# Patient Record
Sex: MALE | Race: WHITE | NOT HISPANIC OR LATINO | Employment: FULL TIME | URBAN - METROPOLITAN AREA
[De-identification: names, ages, dates, MRNs, and addresses within clinical notes are randomized per-mention and may not be internally consistent; named-entity substitution may affect disease eponyms.]

---

## 2017-02-27 ENCOUNTER — OFFICE VISIT (OUTPATIENT)
Dept: ORTHOPEDICS | Facility: CLINIC | Age: 42
End: 2017-02-27
Payer: COMMERCIAL

## 2017-02-27 VITALS — RESPIRATION RATE: 16 BRPM | WEIGHT: 223.2 LBS | HEIGHT: 73 IN | BODY MASS INDEX: 29.58 KG/M2

## 2017-02-27 DIAGNOSIS — M17.0 PRIMARY OSTEOARTHRITIS OF BOTH KNEES: Primary | ICD-10-CM

## 2017-02-27 PROCEDURE — 20610 DRAIN/INJ JOINT/BURSA W/O US: CPT | Mod: 50 | Performed by: ORTHOPAEDIC SURGERY

## 2017-02-27 RX ORDER — METHYLPREDNISOLONE ACETATE 80 MG/ML
80 INJECTION, SUSPENSION INTRA-ARTICULAR; INTRALESIONAL; INTRAMUSCULAR; SOFT TISSUE ONCE
Qty: 2 ML | Refills: 0 | OUTPATIENT
Start: 2017-02-27 | End: 2017-02-27

## 2017-02-27 ASSESSMENT — PAIN SCALES - GENERAL: PAINLEVEL: MODERATE PAIN (4)

## 2017-02-27 NOTE — PROGRESS NOTES
The patient's right knee was prepped with betadine solution after verification of allergies. Area approximately 10 cm x 10 cm prepped in a sterile fashion. After injection, betadine removed with soap and water and band-aids applied.    4cc Lidocaine 1%  NDC 9799-3441-41, LOT -dk,  2018 injected into right knee stacey-superolateral soft/subcutaneous tissue  50cc of yellow/straw colored fluid was intra-articularly aspirated from right knee via the suprapatellar pouch    4cc Lidocaine 1%  NDC 8093-6778-84, LOT ,  2018  3cc Bupivacaine 0.25% NDC 1558-6855-03, LOT ,  2018  1cc Depo Medrol 80 mg/ml NDC 3803-2091-99, LOT [id number],  2019 injected into patient's right Knee by:  Van Napoles PA-C, [facility] (Ortho)  Supervising Physician: Osvaldo Mccoy M.D., M.S.  Dept. of Orthopaedic Surgery  [facility]    The patient's left knee was prepped with betadine solution after verification of allergies. Area approximately 10 cm x 10 cm prepped in a sterile fashion. After injection, betadine removed with soap and water and band-aids applied.    4cc Lidocaine 1%  NDC 6917-7850-66, LOT -dk,  2018  3cc Bupivacaine 0.25% NDC 2860-8343-86, LOT -dk,  2018  1cc Depo Medrol 80 mg/ml NDC 2062-6361-68, LOT [id number],  2019 injected into patient's left Knee by:  Van Napoles PA-C, [facility] (Ortho)  Supervising Physician: Osvaldo Mccoy M.D., M.S.  Dept. of Orthopaedic Surgery  [facility]

## 2017-02-27 NOTE — MR AVS SNAPSHOT
After Visit Summary   2/27/2017    Ayo Hernández    MRN: [id number]           Patient Information     Date Of Birth          1975        Visit Information        Provider Department      2/27/2017 2:30 PM Osvaldo Mccoy MD [facility]        Today's Diagnoses     Primary osteoarthritis of both knees    -  1      Care Instructions    Please remember to call and schedule a follow up appointment if one was recommended at your earliest convenience.  Orthopedics CLINIC HOURS TELEPHONE NUMBER   Dr. Nadine Fiore  Certified Medical Assistant   Monday & Wednesday   8am - 5pm  Thursday 1pm - 5pm  Friday 8am -11:30am Specialty schedulers:   [phone] to schedule your surgery.  Main Clinic:   [phone] to make an appointment with any provider.    Urgent Care locations:    [facility] Monday-Friday Closed  Saturday-Sunday 9am-5pm      Monday-Friday 12pm - 8pm  Saturday-Sunday 9am-5pm [phone]     If SURGERY has been recommended, please call our Specialty Schedulers at [phone] to schedule your procedure.    If you need a medication refill, please contact your pharmacy. Please allow 3 business days for your refill to be completed.    If an MRI or CT scan has been recommended, please call [facility] Schedulers at [phone] to schedule your appointment.  Use [employer] (secure e-mail communication and access to your chart) to send a message or to make an appointment. Please ask how you can sign up for GENIAC.  Your care team's suggested websites for health information:   [email or web address] : Up to date and easily searchable information on multiple topics.   [email or web address] : [place] dept of heat, public health issues in [place], N1N1            Follow-ups after your visit        Follow-up notes from your care team     Return if symptoms worsen or fail to improve.      Who to contact     If you  "have questions or need follow up information about today's clinic visit or your schedule please contact [facility] directly at [phone].  Normal or non-critical lab and imaging results will be communicated to you by [email or web address], letter or phone within 4 business days after the clinic has received the results. If you do not hear from us within 7 days, please contact the clinic through [email or web address] or phone. If you have a critical or abnormal lab result, we will notify you by phone as soon as possible.  Submit refill requests through Jambo or call your pharmacy and they will forward the refill request to us. Please allow 3 business days for your refill to be completed.          Additional Information About Your Visit        Jambo Information     Jambo gives you secure access to your electronic health record. If you see a primary care provider, you can also send messages to your care team and make appointments. If you have questions, please call your primary care clinic.  If you do not have a primary care provider, please call [phone] and they will assist you.        Care EveryWhere ID     This is your Care EveryWhere ID. This could be used by other organizations to access your [place] medical records  [id number]        Your Vitals Were     Respirations Height BMI (Body Mass Index)             16 6' 1\" (1.854 m) 29.45 kg/m2          Blood Pressure from Last 3 Encounters:   07/15/16 (!) 157/98   07/12/16 (!) 148/92   10/31/14 139/87    Weight from Last 3 Encounters:   02/27/17 223 lb 3.2 oz (101.2 kg)   12/01/16 225 lb (102.1 kg)   10/06/16 225 lb (102.1 kg)              We Performed the Following     DRAIN/INJECT LARGE JOINT/BURSA     METHYLPREDNISOLONE 80 MG INJ          Today's Medication Changes          These changes are accurate as of: 2/27/17  3:46 PM.  If you have any questions, ask your nurse or doctor.               Start taking these medicines.        Dose/Directions "    methylPREDNISolone acetate 80 MG/ML injection   Commonly known as:  DEPO-MEDROL   Used for:  Primary osteoarthritis of both knees   Started by:  Osvaldo Mccoy MD        Dose:  80 mg   1 mL (80 mg) by INTRA-ARTICULAR route once for 1 dose   Quantity:  2 mL   Refills:  0            Where to get your medicines      Some of these will need a paper prescription and others can be bought over the counter.  Ask your nurse if you have questions.     You don't need a prescription for these medications     methylPREDNISolone acetate 80 MG/ML injection                Primary Care Provider Office Phone # Fax #    Juan Pablo Johny Kimbrough [phone]       [facility] [address] [facility] [address]        Thank you!     Thank you for choosing [facility]  for your care. Our goal is always to provide you with excellent care. Hearing back from our patients is one way we can continue to improve our services. Please take a few minutes to complete the written survey that you may receive in the mail after your visit with us. Thank you!             Your Updated Medication List - Protect others around you: Learn how to safely use, store and throw away your medicines at www.disposemymeds.org.          This list is accurate as of: 2/27/17  3:46 PM.  Always use your most recent med list.                   Brand Name Dispense Instructions for use    amLODIPine 5 MG tablet    NORVASC    90 tablet    Take 1 tablet (5 mg) by mouth daily       HYDROcodone-acetaminophen 5-325 MG per tablet    NORCO    40 tablet    Take 1-2 tablets by mouth every 4 hours as needed for other (Moderate to Severe Pain)       ibuprofen 200 MG tablet    ADVIL/MOTRIN     Take 200 mg by mouth every 4 hours as needed.       methylPREDNISolone acetate 80 MG/ML injection    DEPO-MEDROL    2 mL    1 mL (80 mg) by INTRA-ARTICULAR route once for 1 dose       sildenafil 50 MG cap/tab    REVATIO/VIAGRA    6  tablet    Take 0.5-1 tablets (25-50 mg) by mouth daily as needed for erectile dysfunction Take 30 min to 4 hours before intercourse To ER if chest pain, shortness of breath , prolonged erections       tadalafil 10 MG tablet    CIALIS    12 tablet    Take 0.5-1 tablets (5-10 mg) by mouth daily as needed for erectile dysfunction Never use with nitroglycerin, terazosin or doxazosin.

## 2017-02-27 NOTE — NURSING NOTE
"Chief Complaint   Patient presents with     RECHECK     Bilateral knee pain. Last injections: right knee aspiration and injection and left knee injection with depo medrol 12/1/16. Injections help until about 1-2 weeks ago. Right knee is different than left because there is pressure from fluid in right knee and left knee has pain. He would like an aspiration and injection in right and injection in left.        Initial Resp 16  Ht 1.854 m (6' 1\")  Wt 101.2 kg (223 lb 3.2 oz)  BMI 29.45 kg/m2 Estimated body mass index is 29.45 kg/(m^2) as calculated from the following:    Height as of this encounter: 1.854 m (6' 1\").    Weight as of this encounter: 101.2 kg (223 lb 3.2 oz).  Medication Reconciliation: oumou Bowers Certified Medical Assistant    "

## 2017-02-27 NOTE — PATIENT INSTRUCTIONS
Please remember to call and schedule a follow up appointment if one was recommended at your earliest convenience.  Orthopedics CLINIC HOURS TELEPHONE NUMBER   Dr. Nadine Fiore  Certified Medical Assistant   Monday & Wednesday   8am - 5pm  Thursday 1pm - 5pm  Friday 8am -11:30am Specialty schedulers:   [phone] to schedule your surgery.  Main Clinic:   [phone] to make an appointment with any provider.    Urgent Care locations:    [facility] Monday-Friday Closed  Saturday-Sunday 9am-5pm      Monday-Friday 12pm - 8pm  Saturday-Sunday 9am-5pm [phone]     If SURGERY has been recommended, please call our Specialty Schedulers at [phone] to schedule your procedure.    If you need a medication refill, please contact your pharmacy. Please allow 3 business days for your refill to be completed.    If an MRI or CT scan has been recommended, please call [facility] Schedulers at [phone] to schedule your appointment.  Use [employer] (secure e-mail communication and access to your chart) to send a message or to make an appointment. Please ask how you can sign up for [employer].  Your care team's suggested websites for health information:   [email or web address] : Up to date and easily searchable information on multiple topics.   [email or web address] : [place] dept of heat, public health issues in [place], N1N1

## 2017-02-27 NOTE — PROGRESS NOTES
Chief Complaint   Patient presents with     RECHECK     Bilateral knee pain. Last injections: right knee aspiration and injection and left knee injection with depo medrol 12/1/16. Injections help until about 1-2 weeks ago. Right knee is different than left because there is pressure from fluid in right knee and left knee has pain. He would like an aspiration and injection in right and injection in left.        HISTORY OF PRESENT ILLNESS:  Ayo Hernández is a 41 year old male seen for bilateral knee pain, osteoarthritis, swelling. 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 Last right knee aspiration and depo medrol cortisone injection was on 12/1/2016. Last left knee depo medrol cortisone injection was on 12/1/2016. Pain today is rated a 4/10. Right knee is painful with swelling. Left knee is painful. Returns today for repeat injections. Pain is located diffusely and is aggravated with walking and stairs.        History reviewed. No pertinent past medical history.    Past Surgical History   Procedure Laterality Date     Orthopedic surgery  08.2012     left knee, arthroscopy     Orthopedic surgery  07.15.2016     right knee     Arthroscopy knee Right 7/15/2016     Procedure: ARTHROSCOPY KNEE;  Surgeon: Osvaldo Mccoy MD;  Location:  OR       Medications:   Current Outpatient Prescriptions:      HYDROcodone-acetaminophen (NORCO) 5-325 MG per tablet, Take 1-2 tablets by mouth every 4 hours as needed for other (Moderate to Severe Pain), Disp: 40 tablet, Rfl: 0     amLODIPine (NORVASC) 5 MG tablet, Take 1 tablet (5 mg) by mouth daily, Disp: 90 tablet, Rfl: 1     tadalafil (CIALIS) 10 MG tablet, Take 0.5-1 tablets (5-10 mg) by mouth daily as needed for erectile dysfunction Never use with nitroglycerin, terazosin or doxazosin., Disp: 12 tablet, Rfl: 4     sildenafil (VIAGRA) 50 MG tablet, Take 0.5-1 tablets (25-50 mg) by mouth daily as  "needed for erectile dysfunction Take 30 min to 4 hours before intercourse To ER if chest pain, shortness of breath , prolonged erections, Disp: 6 tablet, Rfl: 1     ibuprofen (ADVIL,MOTRIN) 200 MG tablet, Take 200 mg by mouth every 4 hours as needed., Disp: , Rfl:     Allergies: No Known Allergies     This document serves as a record of the services and decisions personally performed and made by Osvaldo Mccoy MD. It was created on his behalf by Soha Snyder, a trained medical scribe. The creation of this document is based the provider's statements to the medical scribe.    Scribe Soha Snyder 2:55 PM 2/27/2017     REVIEW OF SYSTEMS:   CONSTITUTIONAL:NEGATIVE for fever, chills, night sweats  INTEGUMENTARY/SKIN: NEGATIVE for worrisome wound problems or redness.  MUSCULOSKELETAL:See HPI above  NEURO: NEGATIVE for weakness, dizziness or paresthesias    PHYSICAL EXAM:  Resp 16  Ht 1.854 m (6' 1\")  Wt 101.2 kg (223 lb 3.2 oz)  BMI 29.45 kg/m2   GENERAL APPEARANCE: healthy, alert, no distress  SKIN: no suspicious lesions or rashes  NEURO: Normal strength and tone, mentation intact and speech normal  PSYCH:  mentation appears normal and affect normal, not anxious  RESPIRATORY: No increased work of breathing.    right  LOWER EXTREMITY:  Gait: slight right quad avoidance     right  LOWER EXTREMITY:  No Quad atrophy, strength normal.  Intact sensation deep peroneal nerve, superficial peroneal nerve, med/lat tibial nerve, sural nerve, saphenous nerve  Intact EHL, EDL, TA, FHL, GS, quadriceps hamstrings and hip flexors  Toes warm and well perfused, brisk capillary refill. Palpable 2+ dp pulses.  calf soft and nttp or squeeze.  Edema: none    right  KNEE EXAM:    Skin: intact, no ecchymosis. Incisions healed.  ROM: 0 degrees extension to 120 degrees flexion  Effusion: moderate  Tender: medial joint line.     Left knee exam:  Skin intact. No erythema or ecchymosis.  No swelling  Effusion: none  Tender to palpation medial joint " line,lateral joint line  Positive medial and patello-femoral crepitus.    X-RAY: none indicated.      Impression: Ayo Hernández is a 41 year old male with bilateral knee pain, advanced primary osteoarthritis.      Plan:   * Will aspirate and inject the right knee today. See procedure note.   * Will inject the left knee as well due to increase in pain, arthritis. See procedure note.     * WB status: weightbearing per comfort. Given the amount of arthritis in his knees, will likely continue with pain, swelling.    * Rest  * Activity modification - avoid activities that aggravate symptoms.  * NSAIDS - regular use for inflammation, with food, as long as no contra-indications. Please discuss with pcp if needed.  * Ice twice daily to three times daily as needed.  * Compression wrap as needed.  * Elevation of extremity to reduce swelling as needed.  * Tylenol as needed for pain  * return to clinic as needed.      PROCEDURE NOTE:  The risks, benefits and potential complications (including but not limited to, bleeding, infection, pain, scar, damage to adjacent structures, atrophy or necrosis of soft tissue, skin blanching, failure to relieve symptoms) of aspiration and injection were discussed with the patient. Questions were addressed and answered.The patient elected to proceed. Verbal consent was obtained. The correct procedural site was identified and confirmed. A Right Knee aspiration was performed using 4mL  of local anesthetic after sterile prep, to the correct procedural site. Approximately 50 mL of yellow, straw colored fluid was aspirated. This was then followed by Right Knee intraarticular injection of 1mL Depo Medrol 80mg per mL and 7mL (4mL 1% lidocaine, 3mL 0.25% marcaine)  of local anesthetic. Sterile bandaid applied. No apparent complications. Did also discuss that if diabetic, recommend close monitoring of blood sugars over the next week as cortisone injections can temporarily elevate blood  sugars.    PROCEDURE NOTE:  The risks, perceived benefits and potential complications (including but not limited to: bleeding, infection, pain, scar, damage to adjacent structures, atrophy or necrosis of soft tissue, skin blanching, failure to relieve symptoms, worsening of symptoms, allergic reaction) of injection were discussed with the patient. Questions were addressed and answered.The patient elected to proceed. Written informed consent was obtained. The correct procedural site was identified and confirmed. A left knee intraarticular injection was performed using 1mL Depo Medrol 80mg per mL and 7mL (4mL 1% lidocaine, 3mL 0.25% marcaine)  of local anesthetic after sterile prep, to the correct procedural site. Sterile bandaid applied. No apparent complications. Did also discuss that if diabetic, recommend close monitoring of blood sugars over the next week as cortisone injections can temporarily elevate blood sugars.    The information in this document, created by a scribe for me, accurately reflects the services I personally performed and the decisions made by me. I have reviewed and approved this document for accuracy.      Osvaldo Mccoy M.D., M.S.  Dept. of Orthopaedic Surgery  [facility]

## 2017-06-14 ENCOUNTER — OFFICE VISIT (OUTPATIENT)
Dept: ORTHOPEDICS | Facility: CLINIC | Age: 42
End: 2017-06-14
Payer: COMMERCIAL

## 2017-06-14 VITALS
HEIGHT: 73 IN | RESPIRATION RATE: 16 BRPM | SYSTOLIC BLOOD PRESSURE: 171 MMHG | DIASTOLIC BLOOD PRESSURE: 100 MMHG | BODY MASS INDEX: 28.07 KG/M2 | WEIGHT: 211.8 LBS

## 2017-06-14 DIAGNOSIS — M25.562 BILATERAL CHRONIC KNEE PAIN: ICD-10-CM

## 2017-06-14 DIAGNOSIS — G89.29 BILATERAL CHRONIC KNEE PAIN: ICD-10-CM

## 2017-06-14 DIAGNOSIS — M25.561 BILATERAL CHRONIC KNEE PAIN: ICD-10-CM

## 2017-06-14 DIAGNOSIS — M17.0 PRIMARY OSTEOARTHRITIS OF BOTH KNEES: Primary | ICD-10-CM

## 2017-06-14 PROCEDURE — 20610 DRAIN/INJ JOINT/BURSA W/O US: CPT | Mod: 50 | Performed by: ORTHOPAEDIC SURGERY

## 2017-06-14 RX ORDER — METHYLPREDNISOLONE ACETATE 80 MG/ML
80 INJECTION, SUSPENSION INTRA-ARTICULAR; INTRALESIONAL; INTRAMUSCULAR; SOFT TISSUE ONCE
Qty: 2 ML | Refills: 0 | OUTPATIENT
Start: 2017-06-14 | End: 2017-06-14

## 2017-06-14 ASSESSMENT — PAIN SCALES - GENERAL: PAINLEVEL: MODERATE PAIN (4)

## 2017-06-14 NOTE — PROGRESS NOTES
Chief Complaint   Patient presents with     RECHECK     Bilateral knee pain. Last aspiration and injection - right knee 2/27/17, left knee injection only 2/27/17. Injections worked good. His knees are good today, has good days and bad days. He would like more injections. His right knee doesn't have as much pressure as before, maybe some swelling.        HISTORY OF PRESENT ILLNESS:  Ayo Hernández is a 41 year old male seen for follow up bilateral knee pain, osteoarthritis, swelling. 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 Last injection for the left knee was on 2/27/2017. He had an aspiration and injection on the right knee 2/27/2017. Today he would like repeat injections. Does not feel a lot of swelling in the knees. Pain today is rated 4/10.       History reviewed. No pertinent past medical history.    Past Surgical History:   Procedure Laterality Date     ARTHROSCOPY KNEE Right 7/15/2016    Procedure: ARTHROSCOPY KNEE;  Surgeon: Osvaldo Mccoy MD;  Location:  OR     ORTHOPEDIC SURGERY  08.2012    left knee, arthroscopy     ORTHOPEDIC SURGERY  07.15.2016    right knee       Medications:   Current Outpatient Prescriptions:      HYDROcodone-acetaminophen (NORCO) 5-325 MG per tablet, Take 1-2 tablets by mouth every 4 hours as needed for other (Moderate to Severe Pain), Disp: 40 tablet, Rfl: 0     amLODIPine (NORVASC) 5 MG tablet, Take 1 tablet (5 mg) by mouth daily, Disp: 90 tablet, Rfl: 1     tadalafil (CIALIS) 10 MG tablet, Take 0.5-1 tablets (5-10 mg) by mouth daily as needed for erectile dysfunction Never use with nitroglycerin, terazosin or doxazosin., Disp: 12 tablet, Rfl: 4     sildenafil (VIAGRA) 50 MG tablet, Take 0.5-1 tablets (25-50 mg) by mouth daily as needed for erectile dysfunction Take 30 min to 4 hours before intercourse To ER if chest pain, shortness of breath , prolonged erections, Disp: 6 tablet, Rfl:  "1     ibuprofen (ADVIL,MOTRIN) 200 MG tablet, Take 200 mg by mouth every 4 hours as needed., Disp: , Rfl:     Allergies: No Known Allergies     This document serves as a record of the services and decisions personally performed and made by Osvaldo Mccoy MD. It was created on his behalf by Soha Snyder, a trained medical scribe. The creation of this document is based the provider's statements to the medical scribe.    Scribe Soha Snyder 2:55 PM 2/27/2017     REVIEW OF SYSTEMS:   CONSTITUTIONAL:NEGATIVE for fever, chills, night sweats  INTEGUMENTARY/SKIN: NEGATIVE for worrisome wound problems or redness.  MUSCULOSKELETAL:See HPI above  NEURO: NEGATIVE for weakness, dizziness or paresthesias    PHYSICAL EXAM:  BP (!) 171/100  Resp 16  Ht 6' 1\" (1.854 m)  Wt 211 lb 12.8 oz (96.1 kg)  BMI 27.94 kg/m2   GENERAL APPEARANCE: healthy, alert, no distress  SKIN: no suspicious lesions or rashes  NEURO: Normal strength and tone, mentation intact and speech normal  PSYCH:  mentation appears normal and affect normal, not anxious  RESPIRATORY: No increased work of breathing.    right  LOWER EXTREMITY:  Gait: slight right quad avoidance     right  LOWER EXTREMITY:  No Quad atrophy, strength normal.  Intact sensation deep peroneal nerve, superficial peroneal nerve, med/lat tibial nerve, sural nerve, saphenous nerve  Intact EHL, EDL, TA, FHL, GS, quadriceps hamstrings and hip flexors  Toes warm and well perfused, brisk capillary refill. Palpable 2+ dp pulses.  calf soft and nttp or squeeze.  Edema: none    right  KNEE EXAM:    Skin: intact, no ecchymosis. Incisions healed.  ROM: 0 degrees extension to 120 degrees flexion  Effusion: trace  Tender: medial joint line.   Positive crepitus.    Left knee exam:  Skin intact. No erythema or ecchymosis.  No swelling  Effusion: none  Tender to palpation medial joint line, lateral joint line  Positive medial and patello-femoral crepitus.    X-RAY: none indicated.      Impression: Ayo JOSEPH" Betty is a 41 year old male with bilateral knee pain, advanced primary osteoarthritis.      Plan:   * WB status: weightbearing per comfort. Given the amount of arthritis in his knees, will likely continue with pain, swelling.    * Rest  * Activity modification - avoid activities that aggravate symptoms.  * NSAIDS - regular use for inflammation, with food, as long as no contra-indications. Please discuss with pcp if needed.  * Ice twice daily to three times daily as needed.  * Compression wrap as needed.  * Elevation of extremity to reduce swelling as needed.  * Injections: risks and perceived benefits discussed today. Patient elects to proceed in both knees.  * Tylenol as needed for pain  * return to clinic as needed.      PROCEDURE NOTE:  The risks, perceived benefits and potential complications (including but not limited to: bleeding, infection, pain, scar, damage to adjacent structures, atrophy or necrosis of soft tissue, skin blanching, failure to relieve symptoms, worsening of symptoms, allergic reaction) of injection were discussed with the patient. Questions were addressed and answered.The patient elected to proceed. Written informed consent was obtained. The correct procedural site was identified and confirmed. A right knee intraarticular injection was performed using 1mL Depo Medrol 80mg per mL and 7mL (4mL 1% lidocaine, 3mL 0.25% marcaine)  of local anesthetic after sterile prep, to the correct procedural site. Sterile bandaid applied. No apparent complications. Did also discuss that if diabetic, recommend close monitoring of blood sugars over the next week as cortisone injections can temporarily elevate blood sugars.    PROCEDURE NOTE:  The risks, perceived benefits and potential complications (including but not limited to: bleeding, infection, pain, scar, damage to adjacent structures, atrophy or necrosis of soft tissue, skin blanching, failure to relieve symptoms, worsening of symptoms, allergic  reaction) of injection were discussed with the patient. Questions were addressed and answered.The patient elected to proceed. Written informed consent was obtained. The correct procedural site was identified and confirmed. A left knee intraarticular injection was performed using 1mL Depo Medrol 80mg per mL and 7mL (4mL 1% lidocaine, 3mL 0.25% marcaine)  of local anesthetic after sterile prep, to the correct procedural site. Sterile bandaid applied. No apparent complications. Did also discuss that if diabetic, recommend close monitoring of blood sugars over the next week as cortisone injections can temporarily elevate blood sugars.    The information in this document, created by a scribe for me, accurately reflects the services I personally performed and the decisions made by me. I have reviewed and approved this document for accuracy.      Osvaldo Mccoy M.D., M.S.  Dept. of Orthopaedic Surgery  [facility]

## 2017-06-14 NOTE — Clinical Note
6/14/2017       RE: Ayo Hernández  [address]  [facility] [address]           Dear Colleague,    Thank you for referring your patient, Ayo Hernández, to the [facility]. Please see a copy of my visit note below.    Chief Complaint   Patient presents with     RECHECK     Bilateral knee pain. Last aspiration and injection - right knee 2/27/17, left knee injection only 2/27/17.        HISTORY OF PRESENT ILLNESS:  Ayo Hernández is a 41 year old male seen for follow up bilateral knee pain, osteoarthritis, swelling. 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 Last injection for the left knee was on 2/27/2017. He had an aspiration and injection on the right knee 2/27/2017. Today he would like repeat injections. Does not feel a lot of swelling in the knees. Pain today is rated 4/10.       No past medical history on file.    Past Surgical History:   Procedure Laterality Date     ARTHROSCOPY KNEE Right 7/15/2016    Procedure: ARTHROSCOPY KNEE;  Surgeon: Osvaldo Mccoy MD;  Location:  OR     ORTHOPEDIC SURGERY  08.2012    left knee, arthroscopy     ORTHOPEDIC SURGERY  07.15.2016    right knee       Medications:   Current Outpatient Prescriptions:      HYDROcodone-acetaminophen (NORCO) 5-325 MG per tablet, Take 1-2 tablets by mouth every 4 hours as needed for other (Moderate to Severe Pain), Disp: 40 tablet, Rfl: 0     amLODIPine (NORVASC) 5 MG tablet, Take 1 tablet (5 mg) by mouth daily, Disp: 90 tablet, Rfl: 1     tadalafil (CIALIS) 10 MG tablet, Take 0.5-1 tablets (5-10 mg) by mouth daily as needed for erectile dysfunction Never use with nitroglycerin, terazosin or doxazosin., Disp: 12 tablet, Rfl: 4     sildenafil (VIAGRA) 50 MG tablet, Take 0.5-1 tablets (25-50 mg) by mouth daily as needed for erectile dysfunction Take 30 min to 4 hours before intercourse To ER if chest pain, shortness of breath ,  "prolonged erections, Disp: 6 tablet, Rfl: 1     ibuprofen (ADVIL,MOTRIN) 200 MG tablet, Take 200 mg by mouth every 4 hours as needed., Disp: , Rfl:     Allergies: No Known Allergies     This document serves as a record of the services and decisions personally performed and made by Osvaldo Mccoy MD. It was created on his behalf by Soha Snyder, a trained medical scribe. The creation of this document is based the provider's statements to the medical scribe.    Scribe Soha Snyder 2:55 PM 2/27/2017     REVIEW OF SYSTEMS:   CONSTITUTIONAL:NEGATIVE for fever, chills, night sweats  INTEGUMENTARY/SKIN: NEGATIVE for worrisome wound problems or redness.  MUSCULOSKELETAL:See HPI above  NEURO: NEGATIVE for weakness, dizziness or paresthesias    PHYSICAL EXAM:  Ht 1.854 m (6' 1\")   GENERAL APPEARANCE: healthy, alert, no distress  SKIN: no suspicious lesions or rashes  NEURO: Normal strength and tone, mentation intact and speech normal  PSYCH:  mentation appears normal and affect normal, not anxious  RESPIRATORY: No increased work of breathing.    right  LOWER EXTREMITY:  Gait: slight right quad avoidance     right  LOWER EXTREMITY:  No Quad atrophy, strength normal.  Intact sensation deep peroneal nerve, superficial peroneal nerve, med/lat tibial nerve, sural nerve, saphenous nerve  Intact EHL, EDL, TA, FHL, GS, quadriceps hamstrings and hip flexors  Toes warm and well perfused, brisk capillary refill. Palpable 2+ dp pulses.  calf soft and nttp or squeeze.  Edema: none    right  KNEE EXAM:    Skin: intact, no ecchymosis. Incisions healed.  ROM: 0 degrees extension to 120 degrees flexion  Effusion: none  Tender: medial joint line.     Left knee exam:  Skin intact. No erythema or ecchymosis.  No swelling  Effusion: none  Tender to palpation medial joint line,lateral joint line  Positive medial and patello-femoral crepitus.    X-RAY: none indicated.      Impression: Ayo Hernández is a 41 year old male with bilateral knee pain, " advanced primary osteoarthritis.      Plan:   * WB status: weightbearing per comfort. Given the amount of arthritis in his knees, will likely continue with pain, swelling.    * Rest  * Activity modification - avoid activities that aggravate symptoms.  * NSAIDS - regular use for inflammation, with food, as long as no contra-indications. Please discuss with pcp if needed.  * Ice twice daily to three times daily as needed.  * Compression wrap as needed.  * Elevation of extremity to reduce swelling as needed.  * Injections: risks and perceived benefits discussed today. Patient elects to proceed in both knees.  * Tylenol as needed for pain  * return to clinic as needed.      PROCEDURE NOTE:  The risks, perceived benefits and potential complications (including but not limited to: bleeding, infection, pain, scar, damage to adjacent structures, atrophy or necrosis of soft tissue, skin blanching, failure to relieve symptoms, worsening of symptoms, allergic reaction) of injection were discussed with the patient. Questions were addressed and answered.The patient elected to proceed. Written informed consent was obtained. The correct procedural site was identified and confirmed. A right knee intraarticular injection was performed using 1mL Depo Medrol 80mg per mL and 7mL (4mL 1% lidocaine, 3mL 0.25% marcaine)  of local anesthetic after sterile prep, to the correct procedural site. Sterile bandaid applied. No apparent complications. Did also discuss that if diabetic, recommend close monitoring of blood sugars over the next week as cortisone injections can temporarily elevate blood sugars.    PROCEDURE NOTE:  The risks, perceived benefits and potential complications (including but not limited to: bleeding, infection, pain, scar, damage to adjacent structures, atrophy or necrosis of soft tissue, skin blanching, failure to relieve symptoms, worsening of symptoms, allergic reaction) of injection were discussed with the patient.  Questions were addressed and answered.The patient elected to proceed. Written informed consent was obtained. The correct procedural site was identified and confirmed. A left knee intraarticular injection was performed using 1mL Depo Medrol 80mg per mL and 7mL (4mL 1% lidocaine, 3mL 0.25% marcaine)  of local anesthetic after sterile prep, to the correct procedural site. Sterile bandaid applied. No apparent complications. Did also discuss that if diabetic, recommend close monitoring of blood sugars over the next week as cortisone injections can temporarily elevate blood sugars.    The information in this document, created by a scribe for me, accurately reflects the services I personally performed and the decisions made by me. I have reviewed and approved this document for accuracy.      Osvaldo Mccoy M.D., M.S.  Dept. of Orthopaedic Surgery  [facility]      Again, thank you for allowing me to participate in the care of your patient.        Sincerely,              Osvaldo Mccoy MD

## 2017-06-14 NOTE — MR AVS SNAPSHOT
After Visit Summary   6/14/2017    Ayo Hernández    MRN: [id number]           Patient Information     Date Of Birth          1975        Visit Information        Provider Department      6/14/2017 2:30 PM Osvaldo Mccoy MD [facility]        Today's Diagnoses     Primary osteoarthritis of both knees    -  1    Bilateral chronic knee pain          Care Instructions    Please remember to call and schedule a follow up appointment if one was recommended at your earliest convenience.  Orthopedics CLINIC HOURS TELEPHONE NUMBER   Dr. Nadine Fiore  Certified Medical Assistant   Monday & Wednesday   8am - 5pm  Thursday 1pm - 5pm  Friday 8am -11:30am Specialty schedulers:   [phone] to schedule your surgery.  Main Clinic:   [phone] to make an appointment with any provider.    Urgent Care locations:    [facility] Monday-Friday Closed  Saturday-Sunday 9am-5pm      Monday-Friday 12pm - 8pm  Saturday-Sunday 9am-5pm [phone]     If SURGERY has been recommended, please call our Specialty Schedulers at [phone] to schedule your procedure.    If you need a medication refill, please contact your pharmacy. Please allow 3 business days for your refill to be completed.    If an MRI or CT scan has been recommended, please call [facility] Schedulers at [phone] to schedule your appointment.  Use StylePuzzlet (secure e-mail communication and access to your chart) to send a message or to make an appointment. Please ask how you can sign up for [employer].  Your care team's suggested websites for health information:   [email or web address] : Up to date and easily searchable information on multiple topics.   [email or web address] : [place] dept of heat[place], public health issues in [place], N1N1              Follow-ups after your visit        Follow-up notes from your care team     Return if symptoms worsen or fail to improve.     "  Who to contact     If you have questions or need follow up information about today's clinic visit or your schedule please contact [facility] directly at [phone].  Normal or non-critical lab and imaging results will be communicated to you by MyChart, letter or phone within 4 business days after the clinic has received the results. If you do not hear from us within 7 days, please contact the clinic through Vivart[employer] or phone. If you have a critical or abnormal lab result, we will notify you by phone as soon as possible.  Submit refill requests through [employer] or call your pharmacy and they will forward the refill request to us. Please allow 3 business days for your refill to be completed.          Additional Information About Your Visit        [employer] Information     [employer] gives you secure access to your electronic health record. If you see a primary care provider, you can also send messages to your care team and make appointments. If you have questions, please call your primary care clinic.  If you do not have a primary care provider, please call [phone] and they will assist you.        Care EveryWhere ID     This is your Care EveryWhere ID. This could be used by other organizations to access your [place] medical records  [id number]        Your Vitals Were     Respirations Height BMI (Body Mass Index)             16 6' 1\" (1.854 m) 27.94 kg/m2          Blood Pressure from Last 3 Encounters:   06/14/17 (!) 171/100   07/15/16 (!) 157/98   07/12/16 (!) 148/92    Weight from Last 3 Encounters:   06/14/17 211 lb 12.8 oz (96.1 kg)   02/27/17 223 lb 3.2 oz (101.2 kg)   12/01/16 225 lb (102.1 kg)              We Performed the Following     DRAIN/INJECT LARGE JOINT/BURSA     METHYLPREDNISOLONE 80 MG INJ          Today's Medication Changes          These changes are accurate as of: 6/14/17  3:50 PM.  If you have any questions, ask your nurse or doctor.               Start taking these " medicines.        Dose/Directions    methylPREDNISolone acetate 80 MG/ML injection   Commonly known as:  DEPO-MEDROL   Used for:  Bilateral chronic knee pain, Primary osteoarthritis of both knees   Started by:  Osvaldo Mccoy MD        Dose:  80 mg   1 mL (80 mg) by INTRA-ARTICULAR route once for 1 dose   Quantity:  2 mL   Refills:  0            Where to get your medicines      Some of these will need a paper prescription and others can be bought over the counter.  Ask your nurse if you have questions.     You don't need a prescription for these medications     methylPREDNISolone acetate 80 MG/ML injection                Primary Care Provider Office Phone # Fax #    Juan Pablo Johny Kimbrough [phone]       [facility] [address] [facility] [address]        Thank you!     Thank you for choosing [facility]  for your care. Our goal is always to provide you with excellent care. Hearing back from our patients is one way we can continue to improve our services. Please take a few minutes to complete the written survey that you may receive in the mail after your visit with us. Thank you!             Your Updated Medication List - Protect others around you: Learn how to safely use, store and throw away your medicines at www.disposemymeds.org.          This list is accurate as of: 6/14/17  3:50 PM.  Always use your most recent med list.                   Brand Name Dispense Instructions for use    amLODIPine 5 MG tablet    NORVASC    90 tablet    Take 1 tablet (5 mg) by mouth daily       HYDROcodone-acetaminophen 5-325 MG per tablet    NORCO    40 tablet    Take 1-2 tablets by mouth every 4 hours as needed for other (Moderate to Severe Pain)       ibuprofen 200 MG tablet    ADVIL/MOTRIN     Take 200 mg by mouth every 4 hours as needed.       methylPREDNISolone acetate 80 MG/ML injection    DEPO-MEDROL    2 mL    1 mL (80 mg) by INTRA-ARTICULAR route once for 1 dose        sildenafil 50 MG cap/tab    REVATIO/VIAGRA    6 tablet    Take 0.5-1 tablets (25-50 mg) by mouth daily as needed for erectile dysfunction Take 30 min to 4 hours before intercourse To ER if chest pain, shortness of breath , prolonged erections       tadalafil 10 MG tablet    CIALIS    12 tablet    Take 0.5-1 tablets (5-10 mg) by mouth daily as needed for erectile dysfunction Never use with nitroglycerin, terazosin or doxazosin.

## 2017-06-14 NOTE — PROGRESS NOTES
The patient's Bilateral knees were prepped with betadine solution after verification of allergies. Area approximately 10 cm x 10 cm prepped in a sterile fashion. After injection, betadine removed with soap and water and band-aids applied.    4cc Lidocaine 1%  NDC 02176-201-32, LOT [id number],  10/20  3cc Bupivacaine 0.25% NDC 55150-167-10, LOT [id number],  2018  1cc Depo Medrol 80 mg/ml NDC 2317-6933-65, LOT [id number],  2019 injected into patient's Bilateral Knees by:  Van Napoles PA-C, STEFFI (Ortho)  Supervising Physician: Osvaldo Mccoy M.D., M.S.  Dept. of Orthopaedic Surgery  [facility]

## 2017-06-14 NOTE — LETTER
6/14/2017      RE: Ayo Hernández  [address] [facility] [address]       Chief Complaint   Patient presents with     RECHECK     Bilateral knee pain. Last aspiration and injection - right knee 2/27/17, left knee injection only 2/27/17. Injections worked good. His knees are good today, has good days and bad days. He would like more injections. His right knee doesn't have as much pressure as before, maybe some swelling.        HISTORY OF PRESENT ILLNESS:  Ayo Hernández is a 41 year old male seen for follow up bilateral knee pain, osteoarthritis, swelling. 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 Last injection for the left knee was on 2/27/2017. He had an aspiration and injection on the right knee 2/27/2017. Today he would like repeat injections. Does not feel a lot of swelling in the knees. Pain today is rated 4/10.       History reviewed. No pertinent past medical history.    Past Surgical History:   Procedure Laterality Date     ARTHROSCOPY KNEE Right 7/15/2016    Procedure: ARTHROSCOPY KNEE;  Surgeon: Osvaldo Mccoy MD;  Location:  OR     ORTHOPEDIC SURGERY  08.2012    left knee, arthroscopy     ORTHOPEDIC SURGERY  07.15.2016    right knee       Medications:   Current Outpatient Prescriptions:      HYDROcodone-acetaminophen (NORCO) 5-325 MG per tablet, Take 1-2 tablets by mouth every 4 hours as needed for other (Moderate to Severe Pain), Disp: 40 tablet, Rfl: 0     amLODIPine (NORVASC) 5 MG tablet, Take 1 tablet (5 mg) by mouth daily, Disp: 90 tablet, Rfl: 1     tadalafil (CIALIS) 10 MG tablet, Take 0.5-1 tablets (5-10 mg) by mouth daily as needed for erectile dysfunction Never use with nitroglycerin, terazosin or doxazosin., Disp: 12 tablet, Rfl: 4     sildenafil (VIAGRA) 50 MG tablet, Take 0.5-1 tablets (25-50 mg) by mouth daily as needed for erectile dysfunction Take 30 min to 4 hours before intercourse To ER  "if chest pain, shortness of breath , prolonged erections, Disp: 6 tablet, Rfl: 1     ibuprofen (ADVIL,MOTRIN) 200 MG tablet, Take 200 mg by mouth every 4 hours as needed., Disp: , Rfl:     Allergies: No Known Allergies     This document serves as a record of the services and decisions personally performed and made by Osvaldo Mccoy MD. It was created on his behalf by Soha Snyder, a trained medical scribe. The creation of this document is based the provider's statements to the medical scribe.    Scribe Soha Snyder 2:55 PM 2/27/2017     REVIEW OF SYSTEMS:   CONSTITUTIONAL:NEGATIVE for fever, chills, night sweats  INTEGUMENTARY/SKIN: NEGATIVE for worrisome wound problems or redness.  MUSCULOSKELETAL:See HPI above  NEURO: NEGATIVE for weakness, dizziness or paresthesias    PHYSICAL EXAM:  BP (!) 171/100  Resp 16  Ht 6' 1\" (1.854 m)  Wt 211 lb 12.8 oz (96.1 kg)  BMI 27.94 kg/m2   GENERAL APPEARANCE: healthy, alert, no distress  SKIN: no suspicious lesions or rashes  NEURO: Normal strength and tone, mentation intact and speech normal  PSYCH:  mentation appears normal and affect normal, not anxious  RESPIRATORY: No increased work of breathing.    right  LOWER EXTREMITY:  Gait: slight right quad avoidance     right  LOWER EXTREMITY:  No Quad atrophy, strength normal.  Intact sensation deep peroneal nerve, superficial peroneal nerve, med/lat tibial nerve, sural nerve, saphenous nerve  Intact EHL, EDL, TA, FHL, GS, quadriceps hamstrings and hip flexors  Toes warm and well perfused, brisk capillary refill. Palpable 2+ dp pulses.  calf soft and nttp or squeeze.  Edema: none    right  KNEE EXAM:    Skin: intact, no ecchymosis. Incisions healed.  ROM: 0 degrees extension to 120 degrees flexion  Effusion: trace  Tender: medial joint line.   Positive crepitus.    Left knee exam:  Skin intact. No erythema or ecchymosis.  No swelling  Effusion: none  Tender to palpation medial joint line, lateral joint line  Positive medial and " patello-femoral crepitus.    X-RAY: none indicated.      Impression: Ayo Hernández is a 41 year old male with bilateral knee pain, advanced primary osteoarthritis.      Plan:   * WB status: weightbearing per comfort. Given the amount of arthritis in his knees, will likely continue with pain, swelling.    * Rest  * Activity modification - avoid activities that aggravate symptoms.  * NSAIDS - regular use for inflammation, with food, as long as no contra-indications. Please discuss with pcp if needed.  * Ice twice daily to three times daily as needed.  * Compression wrap as needed.  * Elevation of extremity to reduce swelling as needed.  * Injections: risks and perceived benefits discussed today. Patient elects to proceed in both knees.  * Tylenol as needed for pain  * return to clinic as needed.      PROCEDURE NOTE:  The risks, perceived benefits and potential complications (including but not limited to: bleeding, infection, pain, scar, damage to adjacent structures, atrophy or necrosis of soft tissue, skin blanching, failure to relieve symptoms, worsening of symptoms, allergic reaction) of injection were discussed with the patient. Questions were addressed and answered.The patient elected to proceed. Written informed consent was obtained. The correct procedural site was identified and confirmed. A right knee intraarticular injection was performed using 1mL Depo Medrol 80mg per mL and 7mL (4mL 1% lidocaine, 3mL 0.25% marcaine)  of local anesthetic after sterile prep, to the correct procedural site. Sterile bandaid applied. No apparent complications. Did also discuss that if diabetic, recommend close monitoring of blood sugars over the next week as cortisone injections can temporarily elevate blood sugars.    PROCEDURE NOTE:  The risks, perceived benefits and potential complications (including but not limited to: bleeding, infection, pain, scar, damage to adjacent structures, atrophy or necrosis of soft tissue, skin  blanching, failure to relieve symptoms, worsening of symptoms, allergic reaction) of injection were discussed with the patient. Questions were addressed and answered.The patient elected to proceed. Written informed consent was obtained. The correct procedural site was identified and confirmed. A left knee intraarticular injection was performed using 1mL Depo Medrol 80mg per mL and 7mL (4mL 1% lidocaine, 3mL 0.25% marcaine)  of local anesthetic after sterile prep, to the correct procedural site. Sterile bandaid applied. No apparent complications. Did also discuss that if diabetic, recommend close monitoring of blood sugars over the next week as cortisone injections can temporarily elevate blood sugars.    The information in this document, created by a scribe for me, accurately reflects the services I personally performed and the decisions made by me. I have reviewed and approved this document for accuracy.      Osvaldo Mccoy M.D., M.S.  Dept. of Orthopaedic Surgery  [facility]      The patient's Bilateral knees were prepped with betadine solution after verification of allergies. Area approximately 10 cm x 10 cm prepped in a sterile fashion. After injection, betadine removed with soap and water and band-aids applied.    4cc Lidocaine 1%  NDC 62468-823-65, LOT [id number],  10/20  3cc Bupivacaine 0.25% NDC 55150-167-10, LOT [id number],  2018  1cc Depo Medrol 80 mg/ml NDC 1943-2903-82, LOT [id number],  2019 injected into patient's Bilateral Knees by:  Van Napoles PA-C, CAQ (Ortho)  Supervising Physician: Osvaldo Mccoy M.D., M.S.  Dept. of Orthopaedic Surgery  [facility]                Osvaldo Mccoy MD

## 2017-06-14 NOTE — PATIENT INSTRUCTIONS
Please remember to call and schedule a follow up appointment if one was recommended at your earliest convenience.  Orthopedics CLINIC HOURS TELEPHONE NUMBER   Dr. Nadine Fiore  Certified Medical Assistant   Monday & Wednesday   8am - 5pm  Thursday 1pm - 5pm  Friday 8am -11:30am Specialty schedulers:   [phone] to schedule your surgery.  Main Clinic:   [phone] to make an appointment with any provider.    Urgent Care locations:    [facility] Monday-Friday Closed  Saturday-Sunday 9am-5pm      Monday-Friday 12pm - 8pm  Saturday-Sunday 9am-5pm [phone]     If SURGERY has been recommended, please call our Specialty Schedulers at [phone] to schedule your procedure.    If you need a medication refill, please contact your pharmacy. Please allow 3 business days for your refill to be completed.    If an MRI or CT scan has been recommended, please call [facility] Schedulers at [phone] to schedule your appointment.  Use [employer] (secure e-mail communication and access to your chart) to send a message or to make an appointment. Please ask how you can sign up for [employer].  Your care team's suggested websites for health information:   [email or web address] : Up to date and easily searchable information on multiple topics.   [email or web address] : [place] dept of heat, public health issues in [place], N1N1

## 2017-06-14 NOTE — NURSING NOTE
"Chief Complaint   Patient presents with     RECHECK     Bilateral knee pain. Last aspiration and injection - right knee 2/27/17, left knee injection only 2/27/17. Injections worked good. His knees are good today, has good days and bad days. He would like more injections. His right knee doesn't have as much pressure as before, maybe some swelling.        Initial BP (!) 171/100  Resp 16  Ht 1.854 m (6' 1\")  Wt 96.1 kg (211 lb 12.8 oz)  BMI 27.94 kg/m2 Estimated body mass index is 27.94 kg/(m^2) as calculated from the following:    Height as of this encounter: 1.854 m (6' 1\").    Weight as of this encounter: 96.1 kg (211 lb 12.8 oz).  Medication Reconciliation: oumou Bowers Certified Medical Assistant    "

## 2017-09-11 ENCOUNTER — OFFICE VISIT (OUTPATIENT)
Dept: ORTHOPEDICS | Facility: CLINIC | Age: 42
End: 2017-09-11
Payer: COMMERCIAL

## 2017-09-11 VITALS — BODY MASS INDEX: 29.42 KG/M2 | HEIGHT: 73 IN | RESPIRATION RATE: 16 BRPM | WEIGHT: 222 LBS

## 2017-09-11 DIAGNOSIS — M17.11 PRIMARY OSTEOARTHRITIS OF RIGHT KNEE: Primary | ICD-10-CM

## 2017-09-11 PROCEDURE — 20610 DRAIN/INJ JOINT/BURSA W/O US: CPT | Mod: RT | Performed by: ORTHOPAEDIC SURGERY

## 2017-09-11 RX ORDER — METHYLPREDNISOLONE ACETATE 80 MG/ML
80 INJECTION, SUSPENSION INTRA-ARTICULAR; INTRALESIONAL; INTRAMUSCULAR; SOFT TISSUE ONCE
Qty: 1 ML | Refills: 0 | OUTPATIENT
Start: 2017-09-11 | End: 2017-09-11

## 2017-09-11 ASSESSMENT — PAIN SCALES - GENERAL: PAINLEVEL: MODERATE PAIN (4)

## 2017-09-11 NOTE — LETTER
9/11/2017         RE: Ayo Hernández  [address] [facility] [address]        Dear Colleague,    Thank you for referring your patient, Ayo Hernández, to the [facility]. Please see a copy of my visit note below.    Chief Complaint   Patient presents with     RECHECK     bilateral knee oa. Cortisone injections with depo-medrol on 6/14/17. Working well for the left knee, but the right knee is quite painful with twisting or certain movements.        HISTORY OF PRESENT ILLNESS:  Ayo Hernández is a 42 year old male seen for follow up bilateral knee pain, osteoarthritis, swelling. 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 He returns today with bilateral knee pain. With getting up, he will get a small discomfort with getting up from a seated position as well as with lifting his leg up. Also has some twisting pain. Pain is moderate, rated a 4/10. About 4 weeks ago, he notes that he had severe sharp pain that came out of nowhere, causing him to not be able to walk. After days of icing it, his knee got better. This same pain happened again 1 week ago and patient is wondering if it could be just the way he is moving. His last injection was on 6/14/2017. He notes this injection worked well for the left knee, however pain has returned for the right knee.        History reviewed. No pertinent past medical history.    Past Surgical History:   Procedure Laterality Date     ARTHROSCOPY KNEE Right 7/15/2016    Procedure: ARTHROSCOPY KNEE;  Surgeon: Osvaldo Mccoy MD;  Location:  OR     ORTHOPEDIC SURGERY  08.2012    left knee, arthroscopy     ORTHOPEDIC SURGERY  07.15.2016    right knee       Medications:   Current Outpatient Prescriptions:      HYDROcodone-acetaminophen (NORCO) 5-325 MG per tablet, Take 1-2 tablets by mouth every 4 hours as needed for other (Moderate to Severe Pain), Disp: 40  "tablet, Rfl: 0     amLODIPine (NORVASC) 5 MG tablet, Take 1 tablet (5 mg) by mouth daily, Disp: 90 tablet, Rfl: 1     tadalafil (CIALIS) 10 MG tablet, Take 0.5-1 tablets (5-10 mg) by mouth daily as needed for erectile dysfunction Never use with nitroglycerin, terazosin or doxazosin., Disp: 12 tablet, Rfl: 4     sildenafil (VIAGRA) 50 MG tablet, Take 0.5-1 tablets (25-50 mg) by mouth daily as needed for erectile dysfunction Take 30 min to 4 hours before intercourse To ER if chest pain, shortness of breath , prolonged erections, Disp: 6 tablet, Rfl: 1     ibuprofen (ADVIL,MOTRIN) 200 MG tablet, Take 200 mg by mouth every 4 hours as needed., Disp: , Rfl:     Allergies: No Known Allergies     This document serves as a record of the services and decisions personally performed and made by Osvaldo Mccoy MD. It was created on his behalf by Soha Snyder, a trained medical scribe. The creation of this document is based the provider's statements to the medical scribe.    Scribe Soha Snyder 4:28 PM 9/11/2017       REVIEW OF SYSTEMS:   CONSTITUTIONAL:NEGATIVE for fever, chills, night sweats  INTEGUMENTARY/SKIN: NEGATIVE for worrisome wound problems or redness.  MUSCULOSKELETAL:See HPI above  NEURO: NEGATIVE for weakness, dizziness or paresthesias    PHYSICAL EXAM:  Resp 16  Ht 1.854 m (6' 1\")  Wt 100.7 kg (222 lb)  BMI 29.29 kg/m2   GENERAL APPEARANCE: healthy, alert, no distress  SKIN: no suspicious lesions or rashes  NEURO: Normal strength and tone, mentation intact and speech normal  PSYCH:  mentation appears normal and affect normal, not anxious  RESPIRATORY: No increased work of breathing.    right  LOWER EXTREMITY:  Gait: slight right quad avoidance     right  LOWER EXTREMITY:  No Quad atrophy, strength normal.  Intact sensation deep peroneal nerve, superficial peroneal nerve, med/lat tibial nerve, sural nerve, saphenous nerve  Intact EHL, EDL, TA, FHL, GS, quadriceps hamstrings and hip flexors  Toes warm and well " perfused, brisk capillary refill. Palpable 2+ dp pulses.  calf soft and nttp or squeeze.  Edema: none    right  KNEE EXAM:    Skin: intact, no ecchymosis.  ROM: 0 degrees extension to 120 degrees flexion  Effusion: none   Tender: medial joint line.   Positive crepitus.    Left knee exam:  Skin intact. No erythema or ecchymosis.  No swelling  Effusion: none  Tender to palpation medial joint line, lateral joint line  Positive medial and patello-femoral crepitus.    X-RAY: none indicated.      Impression: Ayo Hernández is a 42 year old male with bilateral knee pain, advanced primary osteoarthritis. More right knee pain today.      Plan:   * WB status: weightbearing per comfort. Given the amount of arthritis in his knees, will likely continue with pain, swelling.    * Rest  * Activity modification - avoid activities that aggravate symptoms.  * NSAIDS - regular use for inflammation, with food, as long as no contra-indications. Please discuss with pcp if needed.  * Ice twice daily to three times daily as needed.  * Compression wrap as needed.  * Elevation of extremity to reduce swelling as needed.  * Injections: risks and perceived benefits discussed today. Patient elects to proceed in right knee only.  * Tylenol as needed for pain  * return to clinic as needed.      PROCEDURE NOTE:  The risks, perceived benefits and potential complications (including but not limited to: bleeding, infection, pain, scar, damage to adjacent structures, atrophy or necrosis of soft tissue, skin blanching, failure to relieve symptoms, worsening of symptoms, allergic reaction) of injection were discussed with the patient. Questions were addressed and answered.The patient elected to proceed. Written informed consent was obtained. The correct procedural site was identified and confirmed. A right knee intraarticular injection was performed using 1mL Depo Medrol 80mg per mL and 7mL (4mL 1% lidocaine, 3mL 0.25% marcaine)  of local anesthetic after  sterile prep, to the correct procedural site. Sterile bandaid applied. No apparent complications. Did also discuss that if diabetic, recommend close monitoring of blood sugars over the next week as cortisone injections can temporarily elevate blood sugars.      The information in this document, created by a scribe for me, accurately reflects the services I personally performed and the decisions made by me. I have reviewed and approved this document for accuracy.      Osvaldo Mccoy M.D., M.S.  Dept. of Orthopaedic Surgery  [facility]      The patient's right knee was prepped with betadine solution after verification of allergies. Area approximately 10 cm x 10 cm prepped in a sterile fashion. After injection, betadine removed with soap and water and band-aids applied.    4cc Lidocaine 1% NDC 0776-7510-17, LOT -dk,  2019  3cc Bupivacaine 0.25% NDC 6501-4049-92, LOT -DK,  2018  1cc Depo Medrol 80 mg/ml NDC 7256-7113-17, LOT [id number],  2019   injected into patient's right Knee by:  Van Napoles PA-C, CAQ (Ortho)  Supervising Physician: Osvaldo Mccoy M.D., M.S.  Dept. of Orthopaedic Surgery  [facility]          Again, thank you for allowing me to participate in the care of your patient.        Sincerely,        Osvaldo Mccoy MD

## 2017-09-11 NOTE — NURSING NOTE
"Chief Complaint   Patient presents with     RECHECK     bilateral knee oa. Cortisone injections with depo-medrol on 6/14/17. Working well for the left knee, but the right knee is quite painful with twisting or certain movements.        Initial Resp 16 Estimated body mass index is 29.29 kg/(m^2) as calculated from the following:    Height as of this encounter: 1.854 m (6' 1\").    Weight as of this encounter: 100.7 kg (222 lb).  Medication Reconciliation: complete   Van Napoles PA-C, CAGALLO (Ortho)  Supervising Physician: Osvaldo Mccoy M.D., M.S.  Dept. of Orthopaedic Surgery  [facility]      "

## 2017-09-11 NOTE — PROGRESS NOTES
The patient's right knee was prepped with betadine solution after verification of allergies. Area approximately 10 cm x 10 cm prepped in a sterile fashion. After injection, betadine removed with soap and water and band-aids applied.    4cc Lidocaine 1% NDC 4653-8038-93, LOT -dk,  [id number]  3cc Bupivacaine 0.25% NDC 1074-7932-78, LOT -DK,  2018  1cc Depo Medrol 80 mg/ml NDC 6549-9792-46, LOT [id number],  2019   injected into patient's right Knee by:  Van Napoles PA-C, CAQ (Ortho)  Supervising Physician: Osvaldo Mccoy M.D., M.S.  Dept. of Orthopaedic Surgery  [facility]

## 2017-09-11 NOTE — PROGRESS NOTES
Chief Complaint   Patient presents with     RECHECK     bilateral knee oa. Cortisone injections with depo-medrol on 6/14/17. Working well for the left knee, but the right knee is quite painful with twisting or certain movements.        HISTORY OF PRESENT ILLNESS:  Ayo Hernández is a 42 year old male seen for follow up bilateral knee pain, osteoarthritis, swelling. 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 He returns today with bilateral knee pain. With getting up, he will get a small discomfort with getting up from a seated position as well as with lifting his leg up. Also has some twisting pain. Pain is moderate, rated a 4/10. About 4 weeks ago, he notes that he had severe sharp pain that came out of nowhere, causing him to not be able to walk. After days of icing it, his knee got better. This same pain happened again 1 week ago and patient is wondering if it could be just the way he is moving. His last injection was on 6/14/2017. He notes this injection worked well for the left knee, however pain has returned for the right knee.        History reviewed. No pertinent past medical history.    Past Surgical History:   Procedure Laterality Date     ARTHROSCOPY KNEE Right 7/15/2016    Procedure: ARTHROSCOPY KNEE;  Surgeon: Osvaldo Mccoy MD;  Location:  OR     ORTHOPEDIC SURGERY  08.2012    left knee, arthroscopy     ORTHOPEDIC SURGERY  07.15.2016    right knee       Medications:   Current Outpatient Prescriptions:      HYDROcodone-acetaminophen (NORCO) 5-325 MG per tablet, Take 1-2 tablets by mouth every 4 hours as needed for other (Moderate to Severe Pain), Disp: 40 tablet, Rfl: 0     amLODIPine (NORVASC) 5 MG tablet, Take 1 tablet (5 mg) by mouth daily, Disp: 90 tablet, Rfl: 1     tadalafil (CIALIS) 10 MG tablet, Take 0.5-1 tablets (5-10 mg) by mouth daily as needed for erectile dysfunction Never use with nitroglycerin,  "terazosin or doxazosin., Disp: 12 tablet, Rfl: 4     sildenafil (VIAGRA) 50 MG tablet, Take 0.5-1 tablets (25-50 mg) by mouth daily as needed for erectile dysfunction Take 30 min to 4 hours before intercourse To ER if chest pain, shortness of breath , prolonged erections, Disp: 6 tablet, Rfl: 1     ibuprofen (ADVIL,MOTRIN) 200 MG tablet, Take 200 mg by mouth every 4 hours as needed., Disp: , Rfl:     Allergies: No Known Allergies     This document serves as a record of the services and decisions personally performed and made by Osvaldo Mccoy MD. It was created on his behalf by Soha Snyder, a trained medical scribe. The creation of this document is based the provider's statements to the medical scribe.    Scribe Soha Snyder 4:28 PM 9/11/2017       REVIEW OF SYSTEMS:   CONSTITUTIONAL:NEGATIVE for fever, chills, night sweats  INTEGUMENTARY/SKIN: NEGATIVE for worrisome wound problems or redness.  MUSCULOSKELETAL:See HPI above  NEURO: NEGATIVE for weakness, dizziness or paresthesias    PHYSICAL EXAM:  Resp 16  Ht 1.854 m (6' 1\")  Wt 100.7 kg (222 lb)  BMI 29.29 kg/m2   GENERAL APPEARANCE: healthy, alert, no distress  SKIN: no suspicious lesions or rashes  NEURO: Normal strength and tone, mentation intact and speech normal  PSYCH:  mentation appears normal and affect normal, not anxious  RESPIRATORY: No increased work of breathing.    right  LOWER EXTREMITY:  Gait: slight right quad avoidance     right  LOWER EXTREMITY:  No Quad atrophy, strength normal.  Intact sensation deep peroneal nerve, superficial peroneal nerve, med/lat tibial nerve, sural nerve, saphenous nerve  Intact EHL, EDL, TA, FHL, GS, quadriceps hamstrings and hip flexors  Toes warm and well perfused, brisk capillary refill. Palpable 2+ dp pulses.  calf soft and nttp or squeeze.  Edema: none    right  KNEE EXAM:    Skin: intact, no ecchymosis.  ROM: 0 degrees extension to 120 degrees flexion  Effusion: none   Tender: medial joint line.   Positive " crepitus.    Left knee exam:  Skin intact. No erythema or ecchymosis.  No swelling  Effusion: none  Tender to palpation medial joint line, lateral joint line  Positive medial and patello-femoral crepitus.    X-RAY: none indicated.      Impression: Ayo Hernández is a 42 year old male with bilateral knee pain, advanced primary osteoarthritis. More right knee pain today.      Plan:   * WB status: weightbearing per comfort. Given the amount of arthritis in his knees, will likely continue with pain, swelling.    * Rest  * Activity modification - avoid activities that aggravate symptoms.  * NSAIDS - regular use for inflammation, with food, as long as no contra-indications. Please discuss with pcp if needed.  * Ice twice daily to three times daily as needed.  * Compression wrap as needed.  * Elevation of extremity to reduce swelling as needed.  * Injections: risks and perceived benefits discussed today. Patient elects to proceed in right knee only.  * Tylenol as needed for pain  * return to clinic as needed.      PROCEDURE NOTE:  The risks, perceived benefits and potential complications (including but not limited to: bleeding, infection, pain, scar, damage to adjacent structures, atrophy or necrosis of soft tissue, skin blanching, failure to relieve symptoms, worsening of symptoms, allergic reaction) of injection were discussed with the patient. Questions were addressed and answered.The patient elected to proceed. Written informed consent was obtained. The correct procedural site was identified and confirmed. A right knee intraarticular injection was performed using 1mL Depo Medrol 80mg per mL and 7mL (4mL 1% lidocaine, 3mL 0.25% marcaine)  of local anesthetic after sterile prep, to the correct procedural site. Sterile bandaid applied. No apparent complications. Did also discuss that if diabetic, recommend close monitoring of blood sugars over the next week as cortisone injections can temporarily elevate blood  sugars.      The information in this document, created by a scribe for me, accurately reflects the services I personally performed and the decisions made by me. I have reviewed and approved this document for accuracy.      Osvaldo Mccoy M.D., M.S.  Dept. of Orthopaedic Surgery  [facility]

## 2017-10-20 ENCOUNTER — OFFICE VISIT (OUTPATIENT)
Dept: FAMILY MEDICINE | Facility: CLINIC | Age: 42
End: 2017-10-20
Payer: COMMERCIAL

## 2017-10-20 VITALS
DIASTOLIC BLOOD PRESSURE: 90 MMHG | TEMPERATURE: 98.1 F | SYSTOLIC BLOOD PRESSURE: 140 MMHG | BODY MASS INDEX: 29.69 KG/M2 | HEIGHT: 73 IN | OXYGEN SATURATION: 100 % | HEART RATE: 60 BPM | WEIGHT: 224 LBS

## 2017-10-20 DIAGNOSIS — N52.9 ERECTILE DYSFUNCTION, UNSPECIFIED ERECTILE DYSFUNCTION TYPE: ICD-10-CM

## 2017-10-20 DIAGNOSIS — Z12.5 SPECIAL SCREENING FOR MALIGNANT NEOPLASM OF PROSTATE: ICD-10-CM

## 2017-10-20 DIAGNOSIS — Z00.00 ROUTINE GENERAL MEDICAL EXAMINATION AT A HEALTH CARE FACILITY: Primary | ICD-10-CM

## 2017-10-20 DIAGNOSIS — Z23 NEED FOR PROPHYLACTIC VACCINATION AND INOCULATION AGAINST INFLUENZA: ICD-10-CM

## 2017-10-20 DIAGNOSIS — Z12.11 SPECIAL SCREENING FOR MALIGNANT NEOPLASMS, COLON: ICD-10-CM

## 2017-10-20 DIAGNOSIS — R03.0 ELEVATED BLOOD PRESSURE READING WITHOUT DIAGNOSIS OF HYPERTENSION: ICD-10-CM

## 2017-10-20 LAB
ALBUMIN SERPL-MCNC: 3.8 G/DL (ref 3.4–5)
ALP SERPL-CCNC: 93 U/L (ref 40–150)
ALT SERPL W P-5'-P-CCNC: 48 U/L (ref 0–70)
ANION GAP SERPL CALCULATED.3IONS-SCNC: 5 MMOL/L (ref 3–14)
AST SERPL W P-5'-P-CCNC: 47 U/L (ref 0–45)
BILIRUB SERPL-MCNC: 0.7 MG/DL (ref 0.2–1.3)
BUN SERPL-MCNC: 26 MG/DL (ref 7–30)
CALCIUM SERPL-MCNC: 9.2 MG/DL (ref 8.5–10.1)
CHLORIDE SERPL-SCNC: 105 MMOL/L (ref 94–109)
CO2 SERPL-SCNC: 29 MMOL/L (ref 20–32)
CREAT SERPL-MCNC: 0.85 MG/DL (ref 0.66–1.25)
ERYTHROCYTE [DISTWIDTH] IN BLOOD BY AUTOMATED COUNT: 12.9 % (ref 10–15)
GFR SERPL CREATININE-BSD FRML MDRD: >90 ML/MIN/1.7M2
GLUCOSE SERPL-MCNC: 91 MG/DL (ref 70–99)
HCT VFR BLD AUTO: 40.3 % (ref 40–53)
HGB BLD-MCNC: 13.5 G/DL (ref 13.3–17.7)
MCH RBC QN AUTO: 31.3 PG (ref 26.5–33)
MCHC RBC AUTO-ENTMCNC: 33.5 G/DL (ref 31.5–36.5)
MCV RBC AUTO: 93 FL (ref 78–100)
PLATELET # BLD AUTO: 183 10E9/L (ref 150–450)
POTASSIUM SERPL-SCNC: 5 MMOL/L (ref 3.4–5.3)
PROT SERPL-MCNC: 6.7 G/DL (ref 6.8–8.8)
PSA SERPL-ACNC: 0.28 UG/L (ref 0–4)
RBC # BLD AUTO: 4.32 10E12/L (ref 4.4–5.9)
SODIUM SERPL-SCNC: 139 MMOL/L (ref 133–144)
WBC # BLD AUTO: 8.4 10E9/L (ref 4–11)

## 2017-10-20 PROCEDURE — 90686 IIV4 VACC NO PRSV 0.5 ML IM: CPT | Performed by: FAMILY MEDICINE

## 2017-10-20 PROCEDURE — G0103 PSA SCREENING: HCPCS | Performed by: FAMILY MEDICINE

## 2017-10-20 PROCEDURE — 80053 COMPREHEN METABOLIC PANEL: CPT | Performed by: FAMILY MEDICINE

## 2017-10-20 PROCEDURE — 36415 COLL VENOUS BLD VENIPUNCTURE: CPT | Performed by: FAMILY MEDICINE

## 2017-10-20 PROCEDURE — 90471 IMMUNIZATION ADMIN: CPT | Performed by: FAMILY MEDICINE

## 2017-10-20 PROCEDURE — 85027 COMPLETE CBC AUTOMATED: CPT | Performed by: FAMILY MEDICINE

## 2017-10-20 PROCEDURE — 99396 PREV VISIT EST AGE 40-64: CPT | Mod: 25 | Performed by: FAMILY MEDICINE

## 2017-10-20 RX ORDER — AMLODIPINE BESYLATE 5 MG/1
5 TABLET ORAL DAILY
Qty: 90 TABLET | Refills: 3 | Status: SHIPPED | OUTPATIENT
Start: 2017-10-20 | End: 2018-11-06

## 2017-10-20 RX ORDER — SILDENAFIL 50 MG/1
25-50 TABLET, FILM COATED ORAL DAILY PRN
Qty: 30 TABLET | Refills: 1 | Status: SHIPPED | OUTPATIENT
Start: 2017-10-20 | End: 2020-06-09

## 2017-10-20 RX ORDER — SILDENAFIL CITRATE 20 MG/1
TABLET ORAL
Qty: 30 TABLET | Refills: 0 | Status: SHIPPED | OUTPATIENT
Start: 2017-10-20 | End: 2018-12-11

## 2017-10-20 NOTE — MR AVS SNAPSHOT
After Visit Summary   10/20/2017    Ayo Hernández    MRN: [id number]           Patient Information     Date Of Birth          1975        Visit Information        Provider Department      10/20/2017 9:00 AM Juan Pablo Kimbrough MD [facility]        Today's Diagnoses     Routine general medical examination at a health care facility    -  1    Elevated blood pressure reading without diagnosis of hypertension        Erectile dysfunction, unspecified erectile dysfunction type        Need for prophylactic vaccination and inoculation against influenza        Special screening for malignant neoplasms, colon        Special screening for malignant neoplasm of prostate           Follow-ups after your visit        Future tests that were ordered for you today     Open Future Orders        Priority Expected Expires Ordered    Fecal colorectal cancer screen (FIT) Routine 11/10/2017 1/12/2018 10/20/2017            Who to contact     If you have questions or need follow up information about today's clinic visit or your schedule please contact [facility] directly at [phone].  Normal or non-critical lab and imaging results will be communicated to you by MyChart, letter or phone within 4 business days after the clinic has received the results. If you do not hear from us within 7 days, please contact the clinic through [employer] or phone. If you have a critical or abnormal lab result, we will notify you by phone as soon as possible.  Submit refill requests through [employer] or call your pharmacy and they will forward the refill request to us. Please allow 3 business days for your refill to be completed.          Additional Information About Your Visit        MyChart Information     [employer] gives you secure access to your electronic health record. If you see a primary care provider, you can also send messages to your care team and make appointments. If you have questions, please call your  "primary care clinic.  If you do not have a primary care provider, please call [phone] and they will assist you.        Care EveryWhere ID     This is your Care EveryWhere ID. This could be used by other organizations to access your [place] medical records  [id number]        Your Vitals Were     Pulse Temperature Height Pulse Oximetry BMI (Body Mass Index)       60 98.1  F (36.7  C) (Oral) 6' 1\" (1.854 m) 100% 29.55 kg/m2        Blood Pressure from Last 3 Encounters:   10/20/17 140/90   06/14/17 (!) 171/100   07/15/16 (!) 157/98    Weight from Last 3 Encounters:   10/20/17 224 lb (101.6 kg)   09/11/17 222 lb (100.7 kg)   06/14/17 211 lb 12.8 oz (96.1 kg)              We Performed the Following     CBC with platelets     Comprehensive metabolic panel (BMP + Alb, Alk Phos, ALT, AST, Total. Bili, TP)     FLU VAC, SPLIT VIRUS IM > 3 YO (QUADRIVALENT) [94717]     PSA, screen     Vaccine Administration, Initial [11946]          Today's Medication Changes          These changes are accurate as of: 10/20/17  9:50 AM.  If you have any questions, ask your nurse or doctor.               Start taking these medicines.        Dose/Directions    sildenafil 20 MG tablet   Commonly known as:  REVATIO   Used for:  Erectile dysfunction, unspecified erectile dysfunction type   Started by:  Juan Pablo Kimbrough MD        1-2 tabs daily as needed for ed  Never use with nitroglycerin, terazosin or doxazosin.   Quantity:  30 tablet   Refills:  0         These medicines have changed or have updated prescriptions.        Dose/Directions    sildenafil 50 MG tablet   Commonly known as:  VIAGRA   This may have changed:  reasons to take this   Used for:  Erectile dysfunction, unspecified erectile dysfunction type   Changed by:  Juan Pablo Kimbrough MD        Dose:  25-50 mg   Take 0.5-1 tablets (25-50 mg) by mouth daily as needed Take 30 min to 4 hours before intercourse To ER if chest pain, shortness of breath , prolonged erections "   Quantity:  30 tablet   Refills:  1            Where to get your medicines      These medications were sent to [facility] - [place], [place] - [address]  [address] [place] [address]     Phone:  [phone]     amLODIPine 5 MG tablet    sildenafil 20 MG tablet         Some of these will need a paper prescription and others can be bought over the counter.  Ask your nurse if you have questions.     Bring a paper prescription for each of these medications     sildenafil 50 MG tablet                Primary Care Provider Office Phone # Fax #    Juan Pablo Johny Kimbrough [phone] [address] [facility] [address]        Equal Access to Services     NILE CARDONA : Hadii immanuel leon hadasho Sosebastiánali, waaxda luqadaha, qaybta kaalmada adeegyada, mercedes webster . So [facility] [phone].    ATENCIÓN: Si habla español, tiene a chaney disposición servicios gratuitos de asistencia lingüística. [facility] [phone].    We comply with applicable federal civil rights laws and [place] laws. We do not discriminate on the basis of race, color, national origin, age, disability, sex, sexual orientation, or gender identity.            Thank you!     Thank you for choosing [facility]  for your care. Our goal is always to provide you with excellent care. Hearing back from our patients is one way we can continue to improve our services. Please take a few minutes to complete the written survey that you may receive in the mail after your visit with us. Thank you!             Your Updated Medication List - Protect others around you: Learn how to safely use, store and throw away your medicines at www.disposemymeds.org.          This list is accurate as of: 10/20/17  9:50 AM.  Always use your most recent med list.                   Brand Name Dispense Instructions for use Diagnosis    amLODIPine 5 MG tablet    NORVASC    90 tablet    Take 1 tablet (5 mg) by  mouth daily    Elevated blood pressure reading without diagnosis of hypertension       HYDROcodone-acetaminophen 5-325 MG per tablet    NORCO    40 tablet    Take 1-2 tablets by mouth every 4 hours as needed for other (Moderate to Severe Pain)    Tear of lateral meniscus of right knee, current, subsequent encounter       ibuprofen 200 MG tablet    ADVIL/MOTRIN     Take 200 mg by mouth every 4 hours as needed.        sildenafil 20 MG tablet    REVATIO    30 tablet    1-2 tabs daily as needed for ed  Never use with nitroglycerin, terazosin or doxazosin.    Erectile dysfunction, unspecified erectile dysfunction type       sildenafil 50 MG tablet    VIAGRA    30 tablet    Take 0.5-1 tablets (25-50 mg) by mouth daily as needed Take 30 min to 4 hours before intercourse To ER if chest pain, shortness of breath , prolonged erections    Erectile dysfunction, unspecified erectile dysfunction type       tadalafil 10 MG tablet    CIALIS    12 tablet    Take 0.5-1 tablets (5-10 mg) by mouth daily as needed for erectile dysfunction Never use with nitroglycerin, terazosin or doxazosin.    Other male erectile dysfunction

## 2017-10-20 NOTE — PROGRESS NOTES
SUBJECTIVE:   CC: Ayo Hernández is an 42 year old male who presents for preventative health visit.   History htn. Not taking norvasc. 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   norvasc in past - ok. Limited sodium in diet. Exercise x6/week. Non-smoker.   Cardio - good exercise tolerance.   Dad CVA - 60/htn still alive. Mom with history cancer - colon - 72yo.   No black or bloody stools.     Physical   Annual:     Getting at least 3 servings of Calcium per day::  Yes    Bi-annual eye exam::  Yes    Dental care twice a year::  Yes    Sleep apnea or symptoms of sleep apnea::  None    Diet::  Low salt, Low fat/cholesterol and Carbohydrate counting    Frequency of exercise::  6-7 days/week    Duration of exercise::  45-60 minutes    Taking medications regularly::  Yes    Medication side effects::  None    Additional concerns today::  No            PROBLEMS TO ADD ON...    Today's PHQ-2 Score: PHQ-2 ( 1999 Pfizer) 10/20/2017   Q1: Little interest or pleasure in doing things 0   Q2: Feeling down, depressed or hopeless 0   PHQ-2 Score 0   Q1: Little interest or pleasure in doing things Not at all   Q2: Feeling down, depressed or hopeless Not at all   PHQ-2 Score 0       Abuse: Current or Past(Physical, Sexual or Emotional)- No  Do you feel safe in your environment - Yes    Social History   Substance Use Topics     Smoking status: Never Smoker     Smokeless tobacco: Former User     Types: Snuff     Alcohol use Yes     7 per week mixed and beer     Last PSA:   PSA   Date Value Ref Range Status   09/06/2013 0.33 0 - 4 ug/L Final       Reviewed orders with patient. Reviewed health maintenance and updated orders accordingly - Yes  Labs reviewed in EPIC    Reviewed and updated as needed this visit by clinical staff         Reviewed and updated as needed this visit by Provider            Review of Systems  C: NEGATIVE for fever, chills, change in weight  I: NEGATIVE for worrisome rashes, moles or lesions  E:  "NEGATIVE for vision changes or irritation. Ok in spring.   ENT: NEGATIVE for ear, mouth and throat problems. No major snoring.   R: NEGATIVE for significant cough or SOB  CV: NEGATIVE for chest pain, palpitations or peripheral edema  GI: NEGATIVE for nausea, abdominal pain, heartburn, or change in bowel habits. No abdominal pain. No black or bloody stools.    male: negative for dysuria, hematuria, decreased urinary stream, +erectile dysfunction, urethral discharge  M: NEGATIVE for significant arthralgias or myalgia  N: NEGATIVE for weakness, dizziness or paresthesias  E: NEGATIVE for temperature intolerance, skin/hair changes  H: NEGATIVE for bleeding problems  P: NEGATIVE for changes in mood or affect.  2 boys 17,13yo.     OBJECTIVE:   There were no vitals taken for this visit.   /90  Pulse 60  Temp 98.1  F (36.7  C) (Oral)  Ht 6' 1\" (1.854 m)  Wt 224 lb (101.6 kg)  SpO2 100%  BMI 29.55 kg/m2    Physical Exam  GENERAL: healthy, alert and no distress  EYES: Eyes grossly normal to inspection, PERRL and conjunctivae and sclerae normal  HENT: ear canals and TM's normal, nose and mouth without ulcers or lesions  NECK: no adenopathy, no asymmetry, masses, or scars and thyroid normal to palpation  RESP: lungs clear to auscultation - no rales, rhonchi or wheezes  BREAST: normal without masses, tenderness or nipple discharge and no palpable axillary masses or adenopathy  CV: regular rate and rhythm, normal S1 S2, no S3 or S4, no murmur, click or rub, no peripheral edema and peripheral pulses strong  ABDOMEN: soft, nontender, no hepatosplenomegaly, no masses and bowel sounds normal   (male): patient deferred /rectal exams today.   MS: no gross musculoskeletal defects noted, no edema  SKIN: no suspicious lesions or rashes  NEURO: Normal strength and tone, mentation intact and speech normal  PSYCH: mentation appears normal, affect normal/bright  LYMPH: no cervical, supraclavicular, axillary, or " inguinal adenopathy    ASSESSMENT/PLAN:   ASSESSMENT / PLAN:  (Z00.00) Routine general medical examination at a health care facility  (primary encounter diagnosis)  Comment: generally healthy  Plan: Comprehensive metabolic panel (BMP + Alb, Alk         Phos, ALT, AST, Total. Bili, TP), CBC with         platelets        Reviewed self mole/testicle check handout.  vitaminD supplement. Call/email with questions/concerns     (R03.0) Elevated blood pressure reading without diagnosis of hypertension  Comment: needs help. Lipids ok in past. Great exercise tolerance and lipids ok in past  Plan: amLODIPine (NORVASC) 5 MG tablet        Restart. Limit sodium. Reveiwed risks and side effects of medication  Chest pain or shortness of breath to er. Return to clinic if worse    (N52.9) Erectile dysfunction, unspecified erectile dysfunction type  Comment: viagra ok in past  Plan: sildenafil (VIAGRA) 50 MG tablet, sildenafil         (REVATIO) 20 MG tablet        Reveiwed risks and side effects of medication  To ER if chest pain, shortness of breath , prolonged erections      (Z23) Need for prophylactic vaccination and inoculation against influenza  Plan: FLU VAC, SPLIT VIRUS IM > 3 YO (QUADRIVALENT)         [42895], Vaccine Administration, Initial         [21872]            (Z12.11) Special screening for malignant neoplasms, colon  Plan: Fecal colorectal cancer screen (FIT)        Patient not interested in colonoscopy at this point. Mom with colon cancer but in 70's. Expected course and warning signs reviewed.     (Z12.5) Special screening for malignant neoplasm of prostate  Plan: PSA, screen        Patient deferred exam.        COUNSELING:   Reviewed preventive health counseling, as reflected in patient instructions       Regular exercise       Healthy diet/nutrition       Vision screening       Colon cancer screening       Prostate cancer screening       Osteoporosis Prevention/Bone Health             reports that he has never  "smoked. He has quit using smokeless tobacco. His smokeless tobacco use included Snuff.      Estimated body mass index is 29.29 kg/(m^2) as calculated from the following:    Height as of 9/11/17: 6' 1\" (1.854 m).    Weight as of 9/11/17: 222 lb (100.7 kg).   Weight management plan: Discussed healthy diet and exercise guidelines and patient will follow up in 12 months in clinic to re-evaluate.    Counseling Resources:  ATP IV Guidelines  Pooled Cohorts Equation Calculator  FRAX Risk Assessment  ICSI Preventive Guidelines  Dietary Guidelines for Americans, 2010  USDA's MyPlate  ASA Prophylaxis  Lung CA Screening    Juan Pablo Kimbrough MD  [facility] [place]  Answers for HPI/ROS submitted by the patient on 10/20/2017   PHQ-2 Score: 0    Injectable Influenza Immunization Documentation    1.  Is the person to be vaccinated sick today?   No    2. Does the person to be vaccinated have an allergy to a component   of the vaccine?   No  Egg Allergy Algorithm Link    3. Has the person to be vaccinated ever had a serious reaction   to influenza vaccine in the past?   No    4. Has the person to be vaccinated ever had Guillain-Barré syndrome?   No    Form completed by Rosalee Gaspar CMA         "

## 2017-10-20 NOTE — NURSING NOTE
"Chief Complaint   Patient presents with     Physical       Initial /90  Pulse 60  Temp 98.1  F (36.7  C) (Oral)  Ht 6' 1\" (1.854 m)  Wt 224 lb (101.6 kg)  SpO2 100%  BMI 29.55 kg/m2 Estimated body mass index is 29.55 kg/(m^2) as calculated from the following:    Height as of this encounter: 6' 1\" (1.854 m).    Weight as of this encounter: 224 lb (101.6 kg).  Medication Reconciliation: complete  Rosalee Gaspar CMA    "

## 2017-10-23 ENCOUNTER — TELEPHONE (OUTPATIENT)
Dept: FAMILY MEDICINE | Facility: CLINIC | Age: 42
End: 2017-10-23

## 2017-10-23 NOTE — TELEPHONE ENCOUNTER
Pt notified of provider message as written.  Pt verbalized good understanding.  Rosalee Montes RN

## 2017-10-23 NOTE — TELEPHONE ENCOUNTER
Please call patient. Generally normal results except one liver test slightly high and hemoglobin slightly high. I'd like to repeat these labs and recheck blood pressure this winter. Sooner if stools changes, abdominal pain or fatigue. Limit ALCOHOL and increase iron in diet.     Patient:   Ayo Hernández   [phone] (home) [phone] (work)     Provider:   Juan Pablo Kimbrough MD   Please call/evisit with questions or concerns.

## 2017-10-31 PROCEDURE — 82274 ASSAY TEST FOR BLOOD FECAL: CPT | Performed by: FAMILY MEDICINE

## 2017-11-02 DIAGNOSIS — Z12.11 SPECIAL SCREENING FOR MALIGNANT NEOPLASMS, COLON: ICD-10-CM

## 2017-11-03 LAB — HEMOCCULT STL QL IA: NEGATIVE

## 2018-01-03 ENCOUNTER — OFFICE VISIT (OUTPATIENT)
Dept: ORTHOPEDICS | Facility: CLINIC | Age: 43
End: 2018-01-03
Payer: COMMERCIAL

## 2018-01-03 VITALS — WEIGHT: 222 LBS | RESPIRATION RATE: 16 BRPM | HEIGHT: 73 IN | BODY MASS INDEX: 29.42 KG/M2

## 2018-01-03 DIAGNOSIS — M25.562 BILATERAL CHRONIC KNEE PAIN: ICD-10-CM

## 2018-01-03 DIAGNOSIS — M25.561 BILATERAL CHRONIC KNEE PAIN: ICD-10-CM

## 2018-01-03 DIAGNOSIS — G89.29 BILATERAL CHRONIC KNEE PAIN: ICD-10-CM

## 2018-01-03 DIAGNOSIS — M17.0 PRIMARY OSTEOARTHRITIS OF BOTH KNEES: Primary | ICD-10-CM

## 2018-01-03 PROCEDURE — 20610 DRAIN/INJ JOINT/BURSA W/O US: CPT | Mod: 50 | Performed by: ORTHOPAEDIC SURGERY

## 2018-01-03 RX ORDER — TRIAMCINOLONE ACETONIDE 40 MG/ML
40 INJECTION, SUSPENSION INTRA-ARTICULAR; INTRAMUSCULAR ONCE
Qty: 1 ML | Refills: 0 | OUTPATIENT
Start: 2018-01-03 | End: 2018-01-03

## 2018-01-03 ASSESSMENT — PAIN SCALES - GENERAL: PAINLEVEL: MODERATE PAIN (4)

## 2018-01-03 NOTE — MR AVS SNAPSHOT
After Visit Summary   1/3/2018    Ayo Hernández    MRN: [id number]           Patient Information     Date Of Birth          1975        Visit Information        Provider Department      1/3/2018 4:00 PM Osvaldo Mccoy MD [facility]        Today's Diagnoses     Primary osteoarthritis of both knees    -  1    Bilateral chronic knee pain          Care Instructions    Please remember to call and schedule a follow up appointment if one was recommended at your earliest convenience.  Orthopedics CLINIC HOURS TELEPHONE NUMBER   Dr. Nadine Fiore  Certified Medical Assistant   Monday & Wednesday   8am - 5pm  Thursday 1pm - 5pm  Friday 8am -11:30am Specialty schedulers:   [phone] to schedule your surgery.  Main Clinic:   [phone] to make an appointment with any provider.    Urgent Care locations:    [facility] Monday-Friday Closed  Saturday-Sunday 9am-5pm      Monday-Friday 12pm - 8pm  Saturday-Sunday 9am-5pm [phone]     If SURGERY has been recommended, please call our Specialty Schedulers at [phone] to schedule your procedure.    If you need a medication refill, please contact your pharmacy. Please allow 3 business days for your refill to be completed.    If an MRI or CT scan has been recommended, please call [facility] Schedulers at [phone] to schedule your appointment.  Use Accurate Groupt (secure e-mail communication and access to your chart) to send a message or to make an appointment. Please ask how you can sign up for Treatsie.  Your care team's suggested websites for health information:   [email or web address] : Up to date and easily searchable information on multiple topics.   [email or web address] : [place] dept of heatl, public health issues in [place], N1N1              Follow-ups after your visit        Follow-up notes from your care team     Return if symptoms worsen or fail to improve.     "  Who to contact     If you have questions or need follow up information about today's clinic visit or your schedule please contact [facility] directly at [phone].  Normal or non-critical lab and imaging results will be communicated to you by MyChart, letter or phone within 4 business days after the clinic has received the results. If you do not hear from us within 7 days, please contact the clinic through WeDidIthart or phone. If you have a critical or abnormal lab result, we will notify you by phone as soon as possible.  Submit refill requests through Sparkroad or call your pharmacy and they will forward the refill request to us. Please allow 3 business days for your refill to be completed.          Additional Information About Your Visit        WeDidItharBlueseed Information     Sparkroad gives you secure access to your electronic health record. If you see a primary care provider, you can also send messages to your care team and make appointments. If you have questions, please call your primary care clinic.  If you do not have a primary care provider, please call [phone] and they will assist you.        Care EveryWhere ID     This is your Care EveryWhere ID. This could be used by other organizations to access your [facility] medical records  [id number]        Your Vitals Were     Respirations Height BMI (Body Mass Index)             16 6' 1\" (1.854 m) 29.29 kg/m2          Blood Pressure from Last 3 Encounters:   10/20/17 140/90   06/14/17 (!) 171/100   07/15/16 (!) 157/98    Weight from Last 3 Encounters:   01/03/18 222 lb (100.7 kg)   10/20/17 224 lb (101.6 kg)   09/11/17 222 lb (100.7 kg)              We Performed the Following     DRAIN/INJECT LARGE JOINT/BURSA     TRIAMCINOLONE ACET INJ NOS          Today's Medication Changes          These changes are accurate as of: 1/3/18  4:17 PM.  If you have any questions, ask your nurse or doctor.               Start taking these medicines.        " Dose/Directions    triamcinolone acetonide 40 MG/ML injection   Commonly known as:  KENALOG-40   Used for:  Bilateral chronic knee pain, Primary osteoarthritis of both knees   Started by:  Osvaldo Mccoy MD        Dose:  40 mg   Inject 1 mL (40 mg) into the muscle once for 1 dose   Quantity:  1 mL   Refills:  0            Where to get your medicines      Some of these will need a paper prescription and others can be bought over the counter.  Ask your nurse if you have questions.     You don't need a prescription for these medications     triamcinolone acetonide 40 MG/ML injection                Primary Care Provider Office Phone # Fax #    Juan Pablo Johny Kimbrough [phone] [address] [facility] [address]        Equal Access to Services     [facility] : Chaparro cruzo Sojesus, waaxda luqadaha, qaybta kaalmada adeegyada, mercedes webster . So [facility] [phone].    ATENCIÓN: Si habla español, tiene a chaney disposición servicios gratuitos de asistencia lingüística. [facility] [phone].    We comply with applicable federal civil rights laws and [place] laws. We do not discriminate on the basis of race, color, national origin, age, disability, sex, sexual orientation, or gender identity.            Thank you!     Thank you for choosing [facility]  for your care. Our goal is always to provide you with excellent care. Hearing back from our patients is one way we can continue to improve our services. Please take a few minutes to complete the written survey that you may receive in the mail after your visit with us. Thank you!             Your Updated Medication List - Protect others around you: Learn how to safely use, store and throw away your medicines at www.disposemymeds.org.          This list is accurate as of: 1/3/18  4:17 PM.  Always use your most recent med list.                   Brand Name Dispense Instructions for use Diagnosis     amLODIPine 5 MG tablet    NORVASC    90 tablet    Take 1 tablet (5 mg) by mouth daily    Elevated blood pressure reading without diagnosis of hypertension       HYDROcodone-acetaminophen 5-325 MG per tablet    NORCO    40 tablet    Take 1-2 tablets by mouth every 4 hours as needed for other (Moderate to Severe Pain)    Tear of lateral meniscus of right knee, current, subsequent encounter       ibuprofen 200 MG tablet    ADVIL/MOTRIN     Take 200 mg by mouth every 4 hours as needed.        sildenafil 20 MG tablet    REVATIO    30 tablet    1-2 tabs daily as needed for ed  Never use with nitroglycerin, terazosin or doxazosin.    Erectile dysfunction, unspecified erectile dysfunction type       sildenafil 50 MG tablet    VIAGRA    30 tablet    Take 0.5-1 tablets (25-50 mg) by mouth daily as needed Take 30 min to 4 hours before intercourse To ER if chest pain, shortness of breath , prolonged erections    Erectile dysfunction, unspecified erectile dysfunction type       tadalafil 10 MG tablet    CIALIS    12 tablet    Take 0.5-1 tablets (5-10 mg) by mouth daily as needed for erectile dysfunction Never use with nitroglycerin, terazosin or doxazosin.    Other male erectile dysfunction       triamcinolone acetonide 40 MG/ML injection    KENALOG-40    1 mL    Inject 1 mL (40 mg) into the muscle once for 1 dose    Bilateral chronic knee pain, Primary osteoarthritis of both knees

## 2018-01-03 NOTE — PROGRESS NOTES
Chief Complaint   Patient presents with     RECHECK     Bilateral knee oa. Would like bilateral knee injections today. last bilateral cortisone was on 6/14/17. had a right knee cortisone injection on 9/11/17       HISTORY OF PRESENT ILLNESS:  Ayo Hernández is a 42 year old male seen for follow up bilateral knee pain, advanced primary osteoarthritis, swelling. 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 He returns today with bilateral knee pain. Pain is moderate, rated a 4/10. His last injection in the right knee was on 9/11/2017. His last injection in the left knee was on 6/14/2017. Both injections are starting to wear off. He would like repeat injections today, bilaterally.       History reviewed. No pertinent past medical history.    Past Surgical History:   Procedure Laterality Date     ARTHROSCOPY KNEE Right 7/15/2016    Procedure: ARTHROSCOPY KNEE;  Surgeon: Osvaldo Mccoy MD;  Location:  OR     ORTHOPEDIC SURGERY  08.2012    left knee, arthroscopy     ORTHOPEDIC SURGERY  07.15.2016    right knee       Medications:   Current Outpatient Prescriptions:      amLODIPine (NORVASC) 5 MG tablet, Take 1 tablet (5 mg) by mouth daily, Disp: 90 tablet, Rfl: 3     sildenafil (VIAGRA) 50 MG tablet, Take 0.5-1 tablets (25-50 mg) by mouth daily as needed Take 30 min to 4 hours before intercourse To ER if chest pain, shortness of breath , prolonged erections, Disp: 30 tablet, Rfl: 1     sildenafil (REVATIO) 20 MG tablet, 1-2 tabs daily as needed for ed  Never use with nitroglycerin, terazosin or doxazosin., Disp: 30 tablet, Rfl: 0     HYDROcodone-acetaminophen (NORCO) 5-325 MG per tablet, Take 1-2 tablets by mouth every 4 hours as needed for other (Moderate to Severe Pain), Disp: 40 tablet, Rfl: 0     tadalafil (CIALIS) 10 MG tablet, Take 0.5-1 tablets (5-10 mg) by mouth daily as needed for erectile dysfunction Never use with  "nitroglycerin, terazosin or doxazosin., Disp: 12 tablet, Rfl: 4     ibuprofen (ADVIL,MOTRIN) 200 MG tablet, Take 200 mg by mouth every 4 hours as needed., Disp: , Rfl:     Allergies: No Known Allergies     This document serves as a record of the services and decisions personally performed and made by Osvaldo Mccoy MD. It was created on his behalf by Soha Snyder, a trained medical scribe. The creation of this document is based the provider's statements to the medical scribe.    Scribe Soha Snyder 3:55 PM 1/3/2018       REVIEW OF SYSTEMS:   CONSTITUTIONAL:NEGATIVE for fever, chills, night sweats  INTEGUMENTARY/SKIN: NEGATIVE for worrisome wound problems or redness.  MUSCULOSKELETAL:See HPI above  NEURO: NEGATIVE for weakness, dizziness or paresthesias    PHYSICAL EXAM:  Resp 16  Ht 1.854 m (6' 1\")  Wt 100.7 kg (222 lb)  BMI 29.29 kg/m2   GENERAL APPEARANCE: healthy, alert, no distress  SKIN: no suspicious lesions or rashes  NEURO: Normal strength and tone, mentation intact and speech normal  PSYCH:  mentation appears normal and affect normal, not anxious  RESPIRATORY: No increased work of breathing.    Gait: slight right quad avoidance     No Quad atrophy, strength normal.  Intact sensation deep peroneal nerve, superficial peroneal nerve, med/lat tibial nerve, sural nerve, saphenous nerve  Intact EHL, EDL, TA, FHL, GS, quadriceps hamstrings and hip flexors  Toes warm and well perfused, brisk capillary refill. Palpable 2+ dp pulses.  calf soft and nttp or squeeze.  Edema: none    right  KNEE EXAM:    Skin: intact, no ecchymosis.  ROM: 0 degrees extension to 120 degrees flexion  Effusion: none   Tender: medial joint line.   Positive crepitus.    Left knee exam:  Skin intact. No erythema or ecchymosis.  No swelling  Effusion: none  Tender to palpation medial joint line, lateral joint line  Positive medial and patello-femoral crepitus.    X-RAY: none indicated.      Impression: Ayo Hernández is a 42 year old male with " chronic bilateral knee pain, advanced primary osteoarthritis.      Plan:   * WB status: weightbearing per comfort. Given the amount of arthritis in his knees, will likely continue with pain, swelling especially with aggressive/strenuous activities.    * Rest  * Activity modification - avoid activities that aggravate symptoms.  * NSAIDS - regular use for inflammation, with food, as long as no contra-indications. Please discuss with pcp if needed.  * Ice twice daily to three times daily as needed.  * Compression wrap as needed.  * Elevation of extremity to reduce swelling as needed.  * Injections: risks and perceived benefits discussed today. Patient elects to proceed bilaterally. Will try Kenalog today.  * Tylenol as needed for pain  * return to clinic as needed.      PROCEDURE NOTE:  The risks, perceived benefits and potential complications (including but not limited to: bleeding, infection, pain, scar, damage to adjacent structures, atrophy or necrosis of soft tissue, skin blanching, failure to relieve symptoms, worsening of symptoms, allergic reaction) of injection were discussed with the patient. Questions were addressed and answered.The patient elected to proceed. Written informed consent was obtained. The correct procedural site was identified and confirmed. A RIGHT knee intraarticular injection was performed using 2mL Kenalog-40 40mg per mL and 7mL (4mL 1% lidocaine, 3mL 0.25% marcaine)  of local anesthetic after sterile prep, to the correct procedural site. Sterile bandaid applied. This was tolerated well by the patient. No apparent complications. Did also discuss that if diabetic, recommend close monitoring of blood sugars over the next week as cortisone injections can temporarily elevate blood sugars.     The risks, perceived benefits and potential complications (including but not limited to: bleeding, infection, pain, scar, damage to adjacent structures, atrophy or necrosis of soft tissue, skin blanching,  failure to relieve symptoms, worsening of symptoms, allergic reaction) of injection were discussed with the patient. Questions were addressed and answered.The patient elected to proceed. Written informed consent was obtained. The correct procedural site was identified and confirmed. A LEFT knee intraarticular injection was performed using 2mL Kenalog-40 40mg per mL and 7mL (4mL 1% lidocaine, 3mL 0.25% marcaine)  of local anesthetic after sterile prep, to the correct procedural site. Sterile bandaid applied. This was tolerated well by the patient. No apparent complications. Did also discuss that if diabetic, recommend close monitoring of blood sugars over the next week as cortisone injections can temporarily elevate blood sugars.    The information in this document, created by a scribe for me, accurately reflects the services I personally performed and the decisions made by me. I have reviewed and approved this document for accuracy.      Osvaldo Mccoy M.D., M.S.  Dept. of Orthopaedic Surgery  [facility]

## 2018-01-03 NOTE — LETTER
1/3/2018         RE: Ayo Hernández  [address]  [facility] [id number]        Dear Colleague,    Thank you for referring your patient, Ayo Hernández, to the [facility]. Please see a copy of my visit note below.    Chief Complaint   Patient presents with     RECHECK     Bilateral knee oa. Would like bilateral knee injections today. last bilateral cortisone was on 6/14/17. had a right knee cortisone injection on 9/11/17       HISTORY OF PRESENT ILLNESS:  Ayo Hernández is a 42 year old male seen for follow up bilateral knee pain, advanced primary osteoarthritis, swelling. 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 He returns today with bilateral knee pain. Pain is moderate, rated a 4/10. His last injection in the right knee was on 9/11/2017. His last injection in the left knee was on 6/14/2017. Both injections are starting to wear off. He would like repeat injections today, bilaterally.       History reviewed. No pertinent past medical history.    Past Surgical History:   Procedure Laterality Date     ARTHROSCOPY KNEE Right 7/15/2016    Procedure: ARTHROSCOPY KNEE;  Surgeon: Osvaldo Mccoy MD;  Location:  OR     ORTHOPEDIC SURGERY  08.2012    left knee, arthroscopy     ORTHOPEDIC SURGERY  07.15.2016    right knee       Medications:   Current Outpatient Prescriptions:      amLODIPine (NORVASC) 5 MG tablet, Take 1 tablet (5 mg) by mouth daily, Disp: 90 tablet, Rfl: 3     sildenafil (VIAGRA) 50 MG tablet, Take 0.5-1 tablets (25-50 mg) by mouth daily as needed Take 30 min to 4 hours before intercourse To ER if chest pain, shortness of breath , prolonged erections, Disp: 30 tablet, Rfl: 1     sildenafil (REVATIO) 20 MG tablet, 1-2 tabs daily as needed for ed  Never use with nitroglycerin, terazosin or doxazosin., Disp: 30 tablet, Rfl: 0     HYDROcodone-acetaminophen (NORCO) 5-325 MG per tablet, Take 1-2  "tablets by mouth every 4 hours as needed for other (Moderate to Severe Pain), Disp: 40 tablet, Rfl: 0     tadalafil (CIALIS) 10 MG tablet, Take 0.5-1 tablets (5-10 mg) by mouth daily as needed for erectile dysfunction Never use with nitroglycerin, terazosin or doxazosin., Disp: 12 tablet, Rfl: 4     ibuprofen (ADVIL,MOTRIN) 200 MG tablet, Take 200 mg by mouth every 4 hours as needed., Disp: , Rfl:     Allergies: No Known Allergies     This document serves as a record of the services and decisions personally performed and made by Osvaldo Mccoy MD. It was created on his behalf by Soha Snyder, a trained medical scribe. The creation of this document is based the provider's statements to the medical scribe.    Scribe Soha Snyder 3:55 PM 1/3/2018       REVIEW OF SYSTEMS:   CONSTITUTIONAL:NEGATIVE for fever, chills, night sweats  INTEGUMENTARY/SKIN: NEGATIVE for worrisome wound problems or redness.  MUSCULOSKELETAL:See HPI above  NEURO: NEGATIVE for weakness, dizziness or paresthesias    PHYSICAL EXAM:  Resp 16  Ht 1.854 m (6' 1\")  Wt 100.7 kg (222 lb)  BMI 29.29 kg/m2   GENERAL APPEARANCE: healthy, alert, no distress  SKIN: no suspicious lesions or rashes  NEURO: Normal strength and tone, mentation intact and speech normal  PSYCH:  mentation appears normal and affect normal, not anxious  RESPIRATORY: No increased work of breathing.    Gait: slight right quad avoidance     No Quad atrophy, strength normal.  Intact sensation deep peroneal nerve, superficial peroneal nerve, med/lat tibial nerve, sural nerve, saphenous nerve  Intact EHL, EDL, TA, FHL, GS, quadriceps hamstrings and hip flexors  Toes warm and well perfused, brisk capillary refill. Palpable 2+ dp pulses.  calf soft and nttp or squeeze.  Edema: none    right  KNEE EXAM:    Skin: intact, no ecchymosis.  ROM: 0 degrees extension to 120 degrees flexion  Effusion: none   Tender: medial joint line.   Positive crepitus.    Left knee exam:  Skin intact. No erythema or " ecchymosis.  No swelling  Effusion: none  Tender to palpation medial joint line, lateral joint line  Positive medial and patello-femoral crepitus.    X-RAY: none indicated.      Impression: Ayo Hernández is a 42 year old male with chronic bilateral knee pain, advanced primary osteoarthritis.      Plan:   * WB status: weightbearing per comfort. Given the amount of arthritis in his knees, will likely continue with pain, swelling especially with aggressive/strenuous activities.    * Rest  * Activity modification - avoid activities that aggravate symptoms.  * NSAIDS - regular use for inflammation, with food, as long as no contra-indications. Please discuss with pcp if needed.  * Ice twice daily to three times daily as needed.  * Compression wrap as needed.  * Elevation of extremity to reduce swelling as needed.  * Injections: risks and perceived benefits discussed today. Patient elects to proceed bilaterally. Will try Kenalog today.  * Tylenol as needed for pain  * return to clinic as needed.      PROCEDURE NOTE:  The risks, perceived benefits and potential complications (including but not limited to: bleeding, infection, pain, scar, damage to adjacent structures, atrophy or necrosis of soft tissue, skin blanching, failure to relieve symptoms, worsening of symptoms, allergic reaction) of injection were discussed with the patient. Questions were addressed and answered.The patient elected to proceed. Written informed consent was obtained. The correct procedural site was identified and confirmed. A RIGHT knee intraarticular injection was performed using 2mL Kenalog-40 40mg per mL and 7mL (4mL 1% lidocaine, 3mL 0.25% marcaine)  of local anesthetic after sterile prep, to the correct procedural site. Sterile bandaid applied. This was tolerated well by the patient. No apparent complications. Did also discuss that if diabetic, recommend close monitoring of blood sugars over the next week as cortisone injections can temporarily  "elevate blood sugars.     The risks, perceived benefits and potential complications (including but not limited to: bleeding, infection, pain, scar, damage to adjacent structures, atrophy or necrosis of soft tissue, skin blanching, failure to relieve symptoms, worsening of symptoms, allergic reaction) of injection were discussed with the patient. Questions were addressed and answered.The patient elected to proceed. Written informed consent was obtained. The correct procedural site was identified and confirmed. A LEFT knee intraarticular injection was performed using 2mL Kenalog-40 40mg per mL and 7mL (4mL 1% lidocaine, 3mL 0.25% marcaine)  of local anesthetic after sterile prep, to the correct procedural site. Sterile bandaid applied. This was tolerated well by the patient. No apparent complications. Did also discuss that if diabetic, recommend close monitoring of blood sugars over the next week as cortisone injections can temporarily elevate blood sugars.    The information in this document, created by a scribe for me, accurately reflects the services I personally performed and the decisions made by me. I have reviewed and approved this document for accuracy.      Osvaldo Mccoy M.D., M.S.  Dept. of Orthopaedic Surgery  [facility]      Chief Complaint   Patient presents with     RECHECK     Bilateral knee oa. Would like bilateral knee injections today. last bilateral cortisone was on 6/14/17. had a right knee cortisone injection on 9/11/17       Initial Resp 16  Ht 1.854 m (6' 1\")  Wt 100.7 kg (222 lb)  BMI 29.29 kg/m2 Estimated body mass index is 29.29 kg/(m^2) as calculated from the following:    Height as of this encounter: 1.854 m (6' 1\").    Weight as of this encounter: 100.7 kg (222 lb).  Medication Reconciliation: complete.  Van Napoles PA-C, CAQ (Ortho)  Supervising Physician: Osvaldo Mccoy M.D., M.S.  Dept. of Orthopaedic Surgery  [facility]        The patient's " Bilateral knees were prepped with betadine solution after verification of allergies. Area approximately 10 cm x 10 cm prepped in a sterile fashion. After injection, betadine removed with soap and water and band-aids applied.    4cc Lidocaine 1%  NDC 07294-319-53, LOT [id number],    3cc Bupivacaine 0.25% NDC 02590-554-27, LOT [id number],  2018  2cc Kenalog 40 NDC 8616-1114-94, LOT [id number],   injected into patient's Bilateral knees by:   Van Napoles PA-C, CAQ (Ortho)  Supervising Physician: Osvaldo Mccoy M.D., M.S.  Dept. of Orthopaedic Surgery  [facility]        Again, thank you for allowing me to participate in the care of your patient.        Sincerely,        Osvaldo Mccoy MD

## 2018-01-03 NOTE — NURSING NOTE
"Chief Complaint   Patient presents with     RECHECK     Bilateral knee oa. Would like bilateral knee injections today. last bilateral cortisone was on 6/14/17. had a right knee cortisone injection on 9/11/17       Initial Resp 16  Ht 1.854 m (6' 1\")  Wt 100.7 kg (222 lb)  BMI 29.29 kg/m2 Estimated body mass index is 29.29 kg/(m^2) as calculated from the following:    Height as of this encounter: 1.854 m (6' 1\").    Weight as of this encounter: 100.7 kg (222 lb).  Medication Reconciliation: complete   Van Napoles PA-C, CAQ (Ortho)  Supervising Physician: Osvaldo Mccoy M.D., M.S.  Dept. of Orthopaedic Surgery  [facility]      "

## 2018-01-03 NOTE — NURSING NOTE
"Chief Complaint   Patient presents with     RECHECK     Bilateral knee oa. Would like bilateral knee injections today.        Initial There were no vitals taken for this visit. Estimated body mass index is 29.55 kg/(m^2) as calculated from the following:    Height as of 10/20/17: 1.854 m (6' 1\").    Weight as of 10/20/17: 101.6 kg (224 lb).  Medication Reconciliation: complete   Van Napoles PA-C, CAQ (Ortho)  Supervising Physician: Osvaldo Mccoy M.D., M.S.  Dept. of Orthopaedic Surgery  [facility]      "

## 2018-01-03 NOTE — PROGRESS NOTES
The patient's Bilateral knees were prepped with betadine solution after verification of allergies. Area approximately 10 cm x 10 cm prepped in a sterile fashion. After injection, betadine removed with soap and water and band-aids applied.    4cc Lidocaine 1%  NDC 67229-908-97, LOT [id number],    3cc Bupivacaine 0.25% NDC 23411-208-27, LOT [id number],  2018  2cc Kenalog 40 NDC 4984-0963-37, LOT [id number],   injected into patient's Bilateral knees by:   Van Napoles PA-C, CAGALLO (Ortho)  Supervising Physician: Osvaldo Mccoy M.D., M.S.  Dept. of Orthopaedic Surgery  [facility]

## 2018-01-03 NOTE — PROGRESS NOTES
"Chief Complaint   Patient presents with     RECHECK     Bilateral knee oa. Would like bilateral knee injections today. last bilateral cortisone was on 6/14/17. had a right knee cortisone injection on 9/11/17       Initial Resp 16  Ht 1.854 m (6' 1\")  Wt 100.7 kg (222 lb)  BMI 29.29 kg/m2 Estimated body mass index is 29.29 kg/(m^2) as calculated from the following:    Height as of this encounter: 1.854 m (6' 1\").    Weight as of this encounter: 100.7 kg (222 lb).  Medication Reconciliation: complete.  Van Napoles PA-C, CAQ (Ortho)  Supervising Physician: Osvaldo Mccoy M.D., M.S.  Dept. of Orthopaedic Surgery  [facility]      "

## 2018-01-03 NOTE — PATIENT INSTRUCTIONS
Please remember to call and schedule a follow up appointment if one was recommended at your earliest convenience.  Orthopedics CLINIC HOURS TELEPHONE NUMBER   Dr. Nadine Fiore  Certified Medical Assistant   Monday & Wednesday   8am - 5pm  Thursday 1pm - 5pm  Friday 8am -11:30am Specialty schedulers:   [phone] to schedule your surgery.  Main Clinic:   [phone] to make an appointment with any provider.    Urgent Care locations:    [facility] Monday-Friday Closed  Saturday-Sunday 9am-5pm      Monday-Friday 12pm - 8pm  Saturday-Sunday 9am-5pm [phone]     If SURGERY has been recommended, please call our Specialty Schedulers at [phone] to schedule your procedure.    If you need a medication refill, please contact your pharmacy. Please allow 3 business days for your refill to be completed.    If an MRI or CT scan has been recommended, please call [facility] Schedulers at [phone] to schedule your appointment.  Use Help [employer]ed[employer] (secure e-mail communication and access to your chart) to send a message or to make an appointment. Please ask how you can sign up for Help Remedies.  Your care team's suggested websites for health information:   [email or web address] : Up to date and easily searchable information on multiple topics.   [email or web address] : [place] dept of heat, public health issues in [place], N1N1

## 2018-02-05 ENCOUNTER — OFFICE VISIT (OUTPATIENT)
Dept: ORTHOPEDICS | Facility: CLINIC | Age: 43
End: 2018-02-05
Payer: COMMERCIAL

## 2018-02-05 ENCOUNTER — RADIANT APPOINTMENT (OUTPATIENT)
Dept: GENERAL RADIOLOGY | Facility: CLINIC | Age: 43
End: 2018-02-05
Attending: ORTHOPAEDIC SURGERY
Payer: COMMERCIAL

## 2018-02-05 VITALS — RESPIRATION RATE: 16 BRPM | BODY MASS INDEX: 29.93 KG/M2 | WEIGHT: 225.8 LBS | HEIGHT: 73 IN

## 2018-02-05 DIAGNOSIS — M25.562 CHRONIC PAIN OF BOTH KNEES: ICD-10-CM

## 2018-02-05 DIAGNOSIS — S86.912A KNEE STRAIN, LEFT, INITIAL ENCOUNTER: Primary | ICD-10-CM

## 2018-02-05 DIAGNOSIS — G89.29 CHRONIC PAIN OF BOTH KNEES: ICD-10-CM

## 2018-02-05 DIAGNOSIS — M25.561 CHRONIC PAIN OF BOTH KNEES: ICD-10-CM

## 2018-02-05 DIAGNOSIS — M17.0 PRIMARY OSTEOARTHRITIS OF BOTH KNEES: ICD-10-CM

## 2018-02-05 PROCEDURE — 73562 X-RAY EXAM OF KNEE 3: CPT | Mod: LT

## 2018-02-05 PROCEDURE — 99213 OFFICE O/P EST LOW 20 MIN: CPT | Performed by: ORTHOPAEDIC SURGERY

## 2018-02-05 ASSESSMENT — PAIN SCALES - GENERAL: PAINLEVEL: SEVERE PAIN (7)

## 2018-02-05 NOTE — MR AVS SNAPSHOT
After Visit Summary   2/5/2018    Ayo Hernández    MRN: [id number]           Patient Information     Date Of Birth          1975        Visit Information        Provider Department      2/5/2018 3:30 PM Osvaldo Mccoy MD [facility]        Today's Diagnoses     Knee strain, left, initial encounter    -  1    Chronic pain of both knees        Primary osteoarthritis of both knees           Follow-ups after your visit        Follow-up notes from your care team     Return if symptoms worsen or fail to improve.      Who to contact     If you have questions or need follow up information about today's clinic visit or your schedule please contact [facility] directly at [phone].  Normal or non-critical lab and imaging results will be communicated to you by netomathart, letter or phone within 4 business days after the clinic has received the results. If you do not hear from us within 7 days, please contact the clinic through netomathart or phone. If you have a critical or abnormal lab result, we will notify you by phone as soon as possible.  Submit refill requests through [employer] or call your pharmacy and they will forward the refill request to us. Please allow 3 business days for your refill to be completed.          Additional Information About Your Visit        MyChart Information     [employer] gives you secure access to your electronic health record. If you see a primary care provider, you can also send messages to your care team and make appointments. If you have questions, please call your primary care clinic.  If you do not have a primary care provider, please call [phone] and they will assist you.        Care EveryWhere ID     This is your Care EveryWhere ID. This could be used by other organizations to access your [facility] medical records  [id number]        Your Vitals Were     Respirations Height BMI (Body Mass Index)           "   16 6' 1\" (1.854 m) 29.79 kg/m2          Blood Pressure from Last 3 Encounters:   10/20/17 140/90   06/14/17 (!) 171/100   07/15/16 (!) 157/98    Weight from Last 3 Encounters:   02/05/18 225 lb 12.8 oz (102.4 kg)   01/03/18 222 lb (100.7 kg)   10/20/17 224 lb (101.6 kg)               Primary Care Provider Office Phone # Fax #    Juan Pablo Johny Kimbrough [phone] [address] [facility] [address]        Equal Access to Services     [facility] Center[facility] : Hadii aad edward hadasho Soomaali, waaxda luqadaha, qaybta kaalmada adeegyada, mercedes webster . So [facility] [phone].    ATENCIÓN: Si habla español, tiene a chaney disposición servicios gratuitos de asistencia lingüística. [facility] [phone].    We comply with applicable federal civil rights laws and [place] laws. We do not discriminate on the basis of race, color, national origin, age, disability, sex, sexual orientation, or gender identity.            Thank you!     Thank you for choosing [facility]  for your care. Our goal is always to provide you with excellent care. Hearing back from our patients is one way we can continue to improve our services. Please take a few minutes to complete the written survey that you may receive in the mail after your visit with us. Thank you!             Your Updated Medication List - Protect others around you: Learn how to safely use, store and throw away your medicines at www.disposemymeds.org.          This list is accurate as of 2/5/18 11:59 PM.  Always use your most recent med list.                   Brand Name Dispense Instructions for use Diagnosis    amLODIPine 5 MG tablet    NORVASC    90 tablet    Take 1 tablet (5 mg) by mouth daily    Elevated blood pressure reading without diagnosis of hypertension       HYDROcodone-acetaminophen 5-325 MG per tablet    NORCO    40 tablet    Take 1-2 tablets by mouth every 4 hours as needed for other (Moderate to " Severe Pain)    Tear of lateral meniscus of right knee, current, subsequent encounter       ibuprofen 200 MG tablet    ADVIL/MOTRIN     Take 200 mg by mouth every 4 hours as needed.        sildenafil 20 MG tablet    REVATIO    30 tablet    1-2 tabs daily as needed for ed  Never use with nitroglycerin, terazosin or doxazosin.    Erectile dysfunction, unspecified erectile dysfunction type       sildenafil 50 MG tablet    VIAGRA    30 tablet    Take 0.5-1 tablets (25-50 mg) by mouth daily as needed Take 30 min to 4 hours before intercourse To ER if chest pain, shortness of breath , prolonged erections    Erectile dysfunction, unspecified erectile dysfunction type       tadalafil 10 MG tablet    CIALIS    12 tablet    Take 0.5-1 tablets (5-10 mg) by mouth daily as needed for erectile dysfunction Never use with nitroglycerin, terazosin or doxazosin.    Other male erectile dysfunction

## 2018-02-05 NOTE — PROGRESS NOTES
Chief Complaint   Patient presents with     Knee Pain     Left knee injury. DOI 2/5/18. Patient states he felt like he hyperextended his knee this am, about 6:30ish. Iced right away. He went to work and it felt ok but then the longer he was on his feet the worse it got. Pain is anterior and posterior, midline. He didn't have swelling earlier. He is having trouble walking, bending the knee.        HISTORY OF PRESENT ILLNESS:  Ayo Hernández is a 42 year old male seen for worsened left knee pain following injury today. Has known chronic bilateral knee pain, advanced primary osteoarthritis, swelling.  He injured his knee early this morning (6:30 AM) after kickboxing. He kicked and as the knee was coming back, he felt he hyperextended the knee. He iced it right away. Eventually went into work and had worsening knee pain and swelling. Came home and continued to ice the knee, but felt it was continuously worsening. He recently had injections with us on 1/3/2018. He reports these injections were working great until the injury this morning. Today his pain is severe, rated a 7/10. Pain is located over the anterior, posterior and midline of the left knee. At onset, he did not notice any swelling, however now, he feels there is swelling in the knee. Pain is aggravated with activities such as prolonged walking and bending the knee. Patient also thinks he now has limited range of motion and stiffness.     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      History reviewed. No pertinent past medical history.    Past Surgical History:   Procedure Laterality Date     ARTHROSCOPY KNEE Right 7/15/2016    Procedure: ARTHROSCOPY KNEE;  Surgeon: Osvaldo Mccoy MD;  Location:  OR     ORTHOPEDIC SURGERY  08.2012    left knee, arthroscopy     ORTHOPEDIC SURGERY  07.15.2016    right knee       Medications:   Current Outpatient Prescriptions:       "amLODIPine (NORVASC) 5 MG tablet, Take 1 tablet (5 mg) by mouth daily, Disp: 90 tablet, Rfl: 3     sildenafil (VIAGRA) 50 MG tablet, Take 0.5-1 tablets (25-50 mg) by mouth daily as needed Take 30 min to 4 hours before intercourse To ER if chest pain, shortness of breath , prolonged erections, Disp: 30 tablet, Rfl: 1     sildenafil (REVATIO) 20 MG tablet, 1-2 tabs daily as needed for ed  Never use with nitroglycerin, terazosin or doxazosin., Disp: 30 tablet, Rfl: 0     HYDROcodone-acetaminophen (NORCO) 5-325 MG per tablet, Take 1-2 tablets by mouth every 4 hours as needed for other (Moderate to Severe Pain), Disp: 40 tablet, Rfl: 0     tadalafil (CIALIS) 10 MG tablet, Take 0.5-1 tablets (5-10 mg) by mouth daily as needed for erectile dysfunction Never use with nitroglycerin, terazosin or doxazosin., Disp: 12 tablet, Rfl: 4     ibuprofen (ADVIL,MOTRIN) 200 MG tablet, Take 200 mg by mouth every 4 hours as needed., Disp: , Rfl:     Allergies: No Known Allergies       REVIEW OF SYSTEMS:   CONSTITUTIONAL:NEGATIVE for fever, chills, night sweats  INTEGUMENTARY/SKIN: NEGATIVE for worrisome wound problems or redness.  MUSCULOSKELETAL:See HPI above  NEURO: NEGATIVE for weakness, dizziness or paresthesias    This document serves as a record of the services and decisions personally performed and made by Osvaldo Mccoy MD. It was created on his behalf by Soha Snyder, a trained medical scribe. The creation of this document is based the provider's statements to the medical scribe.    Scribdimitris Snyder 4:08 PM 2/5/2018       PHYSICAL EXAM:  Resp 16  Ht 1.854 m (6' 1\")  Wt 102.4 kg (225 lb 12.8 oz)  BMI 29.79 kg/m2   GENERAL APPEARANCE: healthy, alert, no distress  SKIN: no suspicious lesions or rashes  NEURO: Normal strength and tone, mentation intact and speech normal  PSYCH:  mentation appears normal and affect normal, not anxious  RESPIRATORY: No increased work of breathing.    Gait: antalgic left.    No Quad atrophy, strength " normal.  Intact sensation deep peroneal nerve, superficial peroneal nerve, med/lat tibial nerve, sural nerve, saphenous nerve  Intact EHL, EDL, TA, FHL, GS, quadriceps hamstrings and hip flexors  Toes warm and well perfused, brisk capillary refill. Palpable 2+ dp pulses.  calf soft and nttp or squeeze.  Edema: none    right  KNEE EXAM:    Skin: intact, no ecchymosis.  ROM: 0 degrees extension to 120 degrees flexion  Effusion: none   Tender: medial joint line.   Positive crepitus.    Left knee exam:  Skin intact. No erythema or ecchymosis.  ROM: 0 extension to 110 flexion  Effusion: moderate to large   Tender to palpation medial joint line, lateral joint line  Positive medial and patello-femoral crepitus.  Knee ligamentous exam grossly stable, but somewhat guarded.    X-RAY: 3 views bilateral knees taken on 2/5/2018 were reviewed in clinic today.    Left: Advanced lateral compartment degenerative changes similar to the prior study, bone on bone with articular flattening, subchondral sclerosis. Moderate patellofemoral degenerative changes with large marginal osteophytes.     Right: Moderate patellofemoral degenerative changes, prominent marginal osteophyte. Marginal osteophyte formation in the lateral compartment without significant joint space loss.     Overall there is mild progression of degenerative changes since a comparison study.      Impression: Ayo Hernández is a 42 year old male with acute left knee strain, chronic bilateral knee pain, advanced primary osteoarthritis.      Plan:   * reviewed xrays no obvious new injury. Noted left knee arthritis as prior, maybe a little progressed.  * most likely strained the knee today, aggravated the underlying arthritis. Possible ligamentous injury, but difficult to tell today. Collaterals seem intact, stable. If there is an anterior cruciate ligament tear, given the amount of arthritis, wouldn't recommend reconstruction either way, so recommend holding off on MRI at  this time.  * will give some time let the knee calm down and reassess.    * Also recommended he wear a hinged knee brace with activities.   * Showed patient a hinged knee brace, but he declines, stating he has one.    * Rest  * Activity modification - avoid activities that aggravate symptoms.  * NSAIDS - regular use for inflammation, with food, as long as no contra-indications. Please discuss with pcp if needed.  * Ice twice daily to three times daily as needed.  * Compression wrap as needed.  * Elevation of extremity to reduce swelling as needed.  * Tylenol as needed for pain  * return to clinic as needed.        The information in this document, created by a scribe for me, accurately reflects the services I personally performed and the decisions made by me. I have reviewed and approved this document for accuracy.      Osvaldo Mccoy M.D., M.S.  Dept. of Orthopaedic Surgery  [facility]

## 2018-02-05 NOTE — LETTER
2/5/2018         RE: Ayo Hernández  [address] [facility] [address]        Dear Colleague,    Thank you for referring your patient, Ayo Hernández, to the [facility]. Please see a copy of my visit note below.    Chief Complaint   Patient presents with     Knee Pain     Left knee injury. DOI 2/5/18. Patient states he felt like he hyperextended his knee this am, about 6:30ish. Iced right away. He went to work and it felt ok but then the longer he was on his feet the worse it got. Pain is anterior and posterior, midline. He didn't have swelling earlier. He is having trouble walking, bending the knee.        HISTORY OF PRESENT ILLNESS:  Ayo Hernández is a 42 year old male seen for worsened left knee pain following injury today. Has known chronic bilateral knee pain, advanced primary osteoarthritis, swelling.  He injured his knee early this morning (6:30 AM) after kickboxing. He kicked and as the knee was coming back, he felt he hyperextended the knee. He iced it right away. Eventually went into work and had worsening knee pain and swelling. Came home and continued to ice the knee, but felt it was continuously worsening. He recently had injections with us on 1/3/2018. He reports these injections were working great until the injury this morning. Today his pain is severe, rated a 7/10. Pain is located over the anterior, posterior and midline of the left knee. At onset, he did not notice any swelling, however now, he feels there is swelling in the knee. Pain is aggravated with activities such as prolonged walking and bending the knee. Patient also thinks he now has limited range of motion and stiffness.     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      History reviewed. No pertinent past medical history.    Past Surgical History:   Procedure Laterality Date     ARTHROSCOPY KNEE Right  "7/15/2016    Procedure: ARTHROSCOPY KNEE;  Surgeon: Osvaldo Mccoy MD;  Location:  OR     ORTHOPEDIC SURGERY  08.2012    left knee, arthroscopy     ORTHOPEDIC SURGERY  07.15.2016    right knee       Medications:   Current Outpatient Prescriptions:      amLODIPine (NORVASC) 5 MG tablet, Take 1 tablet (5 mg) by mouth daily, Disp: 90 tablet, Rfl: 3     sildenafil (VIAGRA) 50 MG tablet, Take 0.5-1 tablets (25-50 mg) by mouth daily as needed Take 30 min to 4 hours before intercourse To ER if chest pain, shortness of breath , prolonged erections, Disp: 30 tablet, Rfl: 1     sildenafil (REVATIO) 20 MG tablet, 1-2 tabs daily as needed for ed  Never use with nitroglycerin, terazosin or doxazosin., Disp: 30 tablet, Rfl: 0     HYDROcodone-acetaminophen (NORCO) 5-325 MG per tablet, Take 1-2 tablets by mouth every 4 hours as needed for other (Moderate to Severe Pain), Disp: 40 tablet, Rfl: 0     tadalafil (CIALIS) 10 MG tablet, Take 0.5-1 tablets (5-10 mg) by mouth daily as needed for erectile dysfunction Never use with nitroglycerin, terazosin or doxazosin., Disp: 12 tablet, Rfl: 4     ibuprofen (ADVIL,MOTRIN) 200 MG tablet, Take 200 mg by mouth every 4 hours as needed., Disp: , Rfl:     Allergies: No Known Allergies       REVIEW OF SYSTEMS:   CONSTITUTIONAL:NEGATIVE for fever, chills, night sweats  INTEGUMENTARY/SKIN: NEGATIVE for worrisome wound problems or redness.  MUSCULOSKELETAL:See HPI above  NEURO: NEGATIVE for weakness, dizziness or paresthesias    This document serves as a record of the services and decisions personally performed and made by Osvaldo Mccoy MD. It was created on his behalf by Soha Snyder, a trained medical scribe. The creation of this document is based the provider's statements to the medical scribe.    Maria De Jesus Snyder 4:08 PM 2/5/2018       PHYSICAL EXAM:  Resp 16  Ht 1.854 m (6' 1\")  Wt 102.4 kg (225 lb 12.8 oz)  BMI 29.79 kg/m2   GENERAL APPEARANCE: healthy, alert, no distress  SKIN: no " suspicious lesions or rashes  NEURO: Normal strength and tone, mentation intact and speech normal  PSYCH:  mentation appears normal and affect normal, not anxious  RESPIRATORY: No increased work of breathing.    Gait: antalgic left.    No Quad atrophy, strength normal.  Intact sensation deep peroneal nerve, superficial peroneal nerve, med/lat tibial nerve, sural nerve, saphenous nerve  Intact EHL, EDL, TA, FHL, GS, quadriceps hamstrings and hip flexors  Toes warm and well perfused, brisk capillary refill. Palpable 2+ dp pulses.  calf soft and nttp or squeeze.  Edema: none    right  KNEE EXAM:    Skin: intact, no ecchymosis.  ROM: 0 degrees extension to 120 degrees flexion  Effusion: none   Tender: medial joint line.   Positive crepitus.    Left knee exam:  Skin intact. No erythema or ecchymosis.  ROM: 0 extension to 110 flexion  Effusion: moderate to large   Tender to palpation medial joint line, lateral joint line  Positive medial and patello-femoral crepitus.  Knee ligamentous exam grossly stable, but somewhat guarded.    X-RAY: 3 views bilateral knees taken on 2/5/2018 were reviewed in clinic today.    Left: Advanced lateral compartment degenerative changes similar to the prior study, bone on bone with articular flattening, subchondral sclerosis. Moderate patellofemoral degenerative changes with large marginal osteophytes.     Right: Moderate patellofemoral degenerative changes, prominent marginal osteophyte. Marginal osteophyte formation in the lateral compartment without significant joint space loss.     Overall there is mild progression of degenerative changes since a comparison study.      Impression: Ayo Hernández is a 42 year old male with acute left knee strain, chronic bilateral knee pain, advanced primary osteoarthritis.      Plan:   * reviewed xrays no obvious new injury. Noted left knee arthritis as prior, maybe a little progressed.  * most likely strained the knee today, aggravated the underlying  arthritis. Possible ligamentous injury, but difficult to tell today. Collaterals seem intact, stable. If there is an anterior cruciate ligament tear, given the amount of arthritis, wouldn't recommend reconstruction either way, so recommend holding off on MRI at this time.  * will give some time let the knee calm down and reassess.    * Also recommended he wear a hinged knee brace with activities.   * Showed patient a hinged knee brace, but he declines, stating he has one.    * Rest  * Activity modification - avoid activities that aggravate symptoms.  * NSAIDS - regular use for inflammation, with food, as long as no contra-indications. Please discuss with pcp if needed.  * Ice twice daily to three times daily as needed.  * Compression wrap as needed.  * Elevation of extremity to reduce swelling as needed.  * Tylenol as needed for pain  * return to clinic as needed.        The information in this document, created by a scribe for me, accurately reflects the services I personally performed and the decisions made by me. I have reviewed and approved this document for accuracy.      Osvaldo Mccoy M.D., M.S.  Dept. of Orthopaedic Surgery  [facility]      Again, thank you for allowing me to participate in the care of your patient.        Sincerely,        Osvaldo Mccoy MD

## 2018-06-13 ENCOUNTER — OFFICE VISIT (OUTPATIENT)
Dept: ORTHOPEDICS | Facility: CLINIC | Age: 43
End: 2018-06-13
Payer: COMMERCIAL

## 2018-06-13 VITALS
SYSTOLIC BLOOD PRESSURE: 173 MMHG | DIASTOLIC BLOOD PRESSURE: 93 MMHG | HEART RATE: 73 BPM | HEIGHT: 73 IN | WEIGHT: 211.2 LBS | BODY MASS INDEX: 27.99 KG/M2

## 2018-06-13 DIAGNOSIS — M17.0 PRIMARY OSTEOARTHRITIS OF BOTH KNEES: Primary | ICD-10-CM

## 2018-06-13 PROCEDURE — 20610 DRAIN/INJ JOINT/BURSA W/O US: CPT | Mod: 50 | Performed by: ORTHOPAEDIC SURGERY

## 2018-06-13 RX ORDER — TRIAMCINOLONE ACETONIDE 40 MG/ML
40 INJECTION, SUSPENSION INTRA-ARTICULAR; INTRAMUSCULAR ONCE
Qty: 1 ML | Refills: 0 | OUTPATIENT
Start: 2018-06-13 | End: 2018-06-13

## 2018-06-13 ASSESSMENT — PAIN SCALES - GENERAL: PAINLEVEL: MODERATE PAIN (5)

## 2018-06-13 NOTE — MR AVS SNAPSHOT
After Visit Summary   6/13/2018    Ayo Hernández    MRN: [id number]           Patient Information     Date Of Birth          1975        Visit Information        Provider Department      6/13/2018 2:15 PM Osvaldo Mccoy MD [facility]        Today's Diagnoses     Primary osteoarthritis of both knees    -  1      Care Instructions    Patient to follow up with Primary Care provider regarding elevated blood pressure.            Follow-ups after your visit        Follow-up notes from your care team     Return if symptoms worsen or fail to improve.      Who to contact     If you have questions or need follow up information about today's clinic visit or your schedule please contact [facility] directly at [phone].  Normal or non-critical lab and imaging results will be communicated to you by MyChart, letter or phone within 4 business days after the clinic has received the results. If you do not hear from us within 7 days, please contact the clinic through [employer] or phone. If you have a critical or abnormal lab result, we will notify you by phone as soon as possible.  Submit refill requests through [employer] or call your pharmacy and they will forward the refill request to us. Please allow 3 business days for your refill to be completed.          Additional Information About Your Visit        MyChart Information     [employer] gives you secure access to your electronic health record. If you see a primary care provider, you can also send messages to your care team and make appointments. If you have questions, please call your primary care clinic.  If you do not have a primary care provider, please call [phone] and they will assist you.        Care EveryWhere ID     This is your Care EveryWhere ID. This could be used by other organizations to access your [place] medical records  [id number]        Your Vitals Were     Pulse Height BMI (Body Mass Index)     "         73 6' 1\" (1.854 m) 27.86 kg/m2          Blood Pressure from Last 3 Encounters:   06/13/18 (!) 173/93   10/20/17 140/90   06/14/17 (!) 171/100    Weight from Last 3 Encounters:   06/13/18 211 lb 3.2 oz (95.8 kg)   02/05/18 225 lb 12.8 oz (102.4 kg)   01/03/18 222 lb (100.7 kg)              We Performed the Following     DRAIN/INJECT LARGE JOINT/BURSA     TRIAMCINOLONE ACET INJ NOS          Today's Medication Changes          These changes are accurate as of 6/13/18  4:14 PM.  If you have any questions, ask your nurse or doctor.               Start taking these medicines.        Dose/Directions    triamcinolone acetonide 40 MG/ML injection   Commonly known as:  KENALOG-40   Used for:  Primary osteoarthritis of both knees   Started by:  Osvaldo Mccoy MD        Dose:  40 mg   Inject 1 mL (40 mg) into the muscle once for 1 dose   Quantity:  1 mL   Refills:  0            Where to get your medicines      Some of these will need a paper prescription and others can be bought over the counter.  Ask your nurse if you have questions.     You don't need a prescription for these medications     triamcinolone acetonide 40 MG/ML injection                Primary Care Provider Office Phone # Fax #    Juan Pablo Johny Kimbrough [phone] [address] [facility] [address]        Equal Access to Services     [facility]: Hadii immanuel leon hadasho Sosebastiánali, waaxda luqadaha, qaybta kaalmada adeegyada, mercedes butterfield. So [facility] [phone].    ATENCIÓN: Si habla español, tiene a chaney disposición servicios gratuitos de asistencia lingüística. Ozzy aguillon [phone].    We comply with applicable federal civil rights laws and [place] laws. We do not discriminate on the basis of race, color, national origin, age, disability, sex, sexual orientation, or gender identity.            Thank you!     Thank you for choosing [facility]  for your care. Our goal is always to " provide you with excellent care. Hearing back from our patients is one way we can continue to improve our services. Please take a few minutes to complete the written survey that you may receive in the mail after your visit with us. Thank you!             Your Updated Medication List - Protect others around you: Learn how to safely use, store and throw away your medicines at www.disposemymeds.org.          This list is accurate as of 6/13/18  4:14 PM.  Always use your most recent med list.                   Brand Name Dispense Instructions for use Diagnosis    amLODIPine 5 MG tablet    NORVASC    90 tablet    Take 1 tablet (5 mg) by mouth daily    Elevated blood pressure reading without diagnosis of hypertension       HYDROcodone-acetaminophen 5-325 MG per tablet    NORCO    40 tablet    Take 1-2 tablets by mouth every 4 hours as needed for other (Moderate to Severe Pain)    Tear of lateral meniscus of right knee, current, subsequent encounter       ibuprofen 200 MG tablet    ADVIL/MOTRIN     Take 200 mg by mouth every 4 hours as needed.        sildenafil 20 MG tablet    REVATIO    30 tablet    1-2 tabs daily as needed for ed  Never use with nitroglycerin, terazosin or doxazosin.    Erectile dysfunction, unspecified erectile dysfunction type       sildenafil 50 MG tablet    VIAGRA    30 tablet    Take 0.5-1 tablets (25-50 mg) by mouth daily as needed Take 30 min to 4 hours before intercourse To ER if chest pain, shortness of breath , prolonged erections    Erectile dysfunction, unspecified erectile dysfunction type       tadalafil 10 MG tablet    CIALIS    12 tablet    Take 0.5-1 tablets (5-10 mg) by mouth daily as needed for erectile dysfunction Never use with nitroglycerin, terazosin or doxazosin.    Other male erectile dysfunction       triamcinolone acetonide 40 MG/ML injection    KENALOG-40    1 mL    Inject 1 mL (40 mg) into the muscle once for 1 dose    Primary osteoarthritis of both knees

## 2018-06-13 NOTE — PROGRESS NOTES
The patient's Bilateral knees were prepped with betadine solution after verification of allergies. Area approximately 10 cm x 10 cm prepped in a sterile fashion. After injection, betadine removed with soap and water and band-aids applied.    4cc Lidocaine 1%  NDC 2003-5426-57, LOT [id number],  [id number]  3cc Bupivacaine 0.25% NDC 55150-167-10, LOT [id number],  2019  2cc Kenalog 40 NDC 9941-0926-34, LOT [id number],   injected into patient's Bilateral knees by:   Van Napoles PA-C, STEFFI (Ortho)  Supervising Physician: Osvaldo Mccoy M.D., M.S.  Dept. of Orthopaedic Surgery  [facility]

## 2018-06-13 NOTE — PROGRESS NOTES
Chief Complaint   Patient presents with     RECHECK     Bilateral knee pain. Last injections: kenalog on 1/3/18. Injections worked good, pain started to come back about 2 weeks ago. He would like more injections today.        HISTORY OF PRESENT ILLNESS:  Ayo Hernández is a 42 year old male seen for chronic bilateral knee pain, advanced primary osteoarthritis, swelling.  He returns today with moderate pain, rated a 5/10. Denies any swelling. He reports he can tell when its time. He will have stiffness with standing. Also has pain with getting up after prolonged sitting. His last injections were on 1/3/2018. These injections worked great, with pain coming back about 2 weeks ago. He would like repeat injections today. Patient continues to be very active.      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      History reviewed. No pertinent past medical history.    Past Surgical History:   Procedure Laterality Date     ARTHROSCOPY KNEE Right 7/15/2016    Procedure: ARTHROSCOPY KNEE;  Surgeon: Osvaldo Mccoy MD;  Location:  OR     ORTHOPEDIC SURGERY  08.2012    left knee, arthroscopy     ORTHOPEDIC SURGERY  07.15.2016    right knee       Medications:   Current Outpatient Prescriptions:      amLODIPine (NORVASC) 5 MG tablet, Take 1 tablet (5 mg) by mouth daily, Disp: 90 tablet, Rfl: 3     HYDROcodone-acetaminophen (NORCO) 5-325 MG per tablet, Take 1-2 tablets by mouth every 4 hours as needed for other (Moderate to Severe Pain), Disp: 40 tablet, Rfl: 0     ibuprofen (ADVIL,MOTRIN) 200 MG tablet, Take 200 mg by mouth every 4 hours as needed., Disp: , Rfl:      sildenafil (REVATIO) 20 MG tablet, 1-2 tabs daily as needed for ed  Never use with nitroglycerin, terazosin or doxazosin., Disp: 30 tablet, Rfl: 0     sildenafil (VIAGRA) 50 MG tablet, Take 0.5-1 tablets (25-50 mg) by mouth daily as needed Take 30 min to 4 hours before  "intercourse To ER if chest pain, shortness of breath , prolonged erections, Disp: 30 tablet, Rfl: 1     tadalafil (CIALIS) 10 MG tablet, Take 0.5-1 tablets (5-10 mg) by mouth daily as needed for erectile dysfunction Never use with nitroglycerin, terazosin or doxazosin., Disp: 12 tablet, Rfl: 4    Allergies: No Known Allergies       REVIEW OF SYSTEMS:   CONSTITUTIONAL:NEGATIVE for fever, chills, night sweats  INTEGUMENTARY/SKIN: NEGATIVE for worrisome wound problems or redness.  MUSCULOSKELETAL:See HPI above  NEURO: NEGATIVE for weakness, dizziness or paresthesias    This document serves as a record of the services and decisions personally performed and made by Osvaldo Mccoy MD. It was created on his behalf by Soha Snyder, a trained medical scribe. The creation of this document is based the provider's statements to the medical scribe.    Scribe Soha Snyder 2:21 PM 6/13/2018        PHYSICAL EXAM:  BP (!) 173/93 (BP Location: Left arm)  Pulse 73  Ht 6' 1\" (1.854 m)  Wt 211 lb 3.2 oz (95.8 kg)  BMI 27.86 kg/m2   GENERAL APPEARANCE: healthy, alert, no distress  SKIN: no suspicious lesions or rashes  NEURO: Normal strength and tone, mentation intact and speech normal  PSYCH:  mentation appears normal and affect normal, not anxious  RESPIRATORY: No increased work of breathing.    Gait: antalgic left.    No Quad atrophy, strength normal.  Intact sensation deep peroneal nerve, superficial peroneal nerve, med/lat tibial nerve, sural nerve, saphenous nerve  Intact EHL, EDL, TA, FHL, GS, quadriceps hamstrings and hip flexors  Toes warm and well perfused, brisk capillary refill. Palpable 2+ dp pulses.  calf soft and nttp or squeeze.  Edema: none    right  KNEE EXAM:    Skin: intact, no ecchymosis.  ROM: 0 degrees extension to 120 degrees flexion  Effusion: none   Tender: medial joint line.   Positive crepitus.  Palpable marginal osteophytes    Left knee exam:  Skin intact. No erythema or ecchymosis.  ROM: 0 extension to 110 " flexion  Effusion: none  Tender to palpation medial joint line, lateral joint line  Positive medial and patello-femoral crepitus.  Palpable marginal osteophytes.      X-RAY: 3 views bilateral knees taken on 2/5/2018 were reviewed in clinic today.    Left: Advanced lateral compartment degenerative changes similar to the prior study, bone on bone with articular flattening, subchondral sclerosis. Moderate patellofemoral degenerative changes with large marginal osteophytes.     Right: Moderate patellofemoral degenerative changes, prominent marginal osteophyte. Marginal osteophyte formation in the lateral compartment without significant joint space loss.     Overall there is mild progression of degenerative changes since a comparison study.      Impression: Ayo Hernández is a 42 year old male with chronic bilateral knee pain, advanced primary osteoarthritis.      Plan:     * WB status: weightbearing per comfort.    * Rest  * Activity modification - avoid activities that aggravate symptoms.  * NSAIDS - regular use for inflammation, with food, as long as no contra-indications. Please discuss with pcp if needed.  * Ice twice daily to three times daily as needed.  * Compression wrap as needed.  * Elevation of extremity to reduce swelling as needed.  * Injections: risks and perceived benefits discussed today. Patient elects to proceed bilaterally.  * Tylenol as needed for pain  * return to clinic as needed.    Patient to follow up with Primary Care provider regarding elevated blood pressure.      PROCEDURE NOTE:  The risks, perceived benefits and potential complications (including but not limited to: bleeding, infection, pain, scar, damage to adjacent structures, atrophy or necrosis of soft tissue, skin blanching, failure to relieve symptoms, worsening of symptoms, allergic reaction) of injection were discussed with the patient. Questions were addressed and answered.The patient elected to proceed. Written informed consent  was obtained. The correct procedural site was identified and confirmed. A RIGHT knee intraarticular injection was performed using 2mL Kenalog-40 40mg per mL and 7mL (4mL 1% lidocaine, 3mL 0.25% marcaine)  of local anesthetic after sterile prep, to the correct procedural site. Sterile bandaid applied. This was tolerated well by the patient. No apparent complications. Did also discuss that if diabetic, recommend close monitoring of blood sugars over the next week as cortisone injections can temporarily elevate blood sugars.     The risks, perceived benefits and potential complications (including but not limited to: bleeding, infection, pain, scar, damage to adjacent structures, atrophy or necrosis of soft tissue, skin blanching, failure to relieve symptoms, worsening of symptoms, allergic reaction) of injection were discussed with the patient. Questions were addressed and answered.The patient elected to proceed. Written informed consent was obtained. The correct procedural site was identified and confirmed. A LEFT knee intraarticular injection was performed using 2mL Kenalog-40 40mg per mL and 7mL (4mL 1% lidocaine, 3mL 0.25% marcaine)  of local anesthetic after sterile prep, to the correct procedural site. Sterile bandaid applied. This was tolerated well by the patient. No apparent complications. Did also discuss that if diabetic, recommend close monitoring of blood sugars over the next week as cortisone injections can temporarily elevate blood sugars.    The information in this document, created by a scribe for me, accurately reflects the services I personally performed and the decisions made by me. I have reviewed and approved this document for accuracy.      Osvaldo Mccoy M.D., M.S.  Dept. of Orthopaedic Surgery  [facility]

## 2018-06-13 NOTE — LETTER
6/13/2018         RE: Ayo Hernández  [address] [facility] [id number]        Dear Colleague,    Thank you for referring your patient, Ayo Hernández, to the [facility]. Please see a copy of my visit note below.    Chief Complaint   Patient presents with     RECHECK     Bilateral knee pain. Last injections: kenalog on 1/3/18. Injections worked good, pain started to come back about 2 weeks ago. He would like more injections today.        HISTORY OF PRESENT ILLNESS:  Ayo Hernández is a 42 year old male seen for chronic bilateral knee pain, advanced primary osteoarthritis, swelling.  He returns today with moderate pain, rated a 5/10. Denies any swelling. He reports he can tell when its time. He will have stiffness with standing. Also has pain with getting up after prolonged sitting. His last injections were on 1/3/2018. These injections worked great, with pain coming back about 2 weeks ago. He would like repeat injections today. Patient continues to be very active.      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      History reviewed. No pertinent past medical history.    Past Surgical History:   Procedure Laterality Date     ARTHROSCOPY KNEE Right 7/15/2016    Procedure: ARTHROSCOPY KNEE;  Surgeon: Osvaldo Mccoy MD;  Location:  OR     ORTHOPEDIC SURGERY  08.2012    left knee, arthroscopy     ORTHOPEDIC SURGERY  07.15.2016    right knee       Medications:   Current Outpatient Prescriptions:      amLODIPine (NORVASC) 5 MG tablet, Take 1 tablet (5 mg) by mouth daily, Disp: 90 tablet, Rfl: 3     HYDROcodone-acetaminophen (NORCO) 5-325 MG per tablet, Take 1-2 tablets by mouth every 4 hours as needed for other (Moderate to Severe Pain), Disp: 40 tablet, Rfl: 0     ibuprofen (ADVIL,MOTRIN) 200 MG tablet, Take 200 mg by mouth every 4 hours as needed., Disp: , Rfl:      sildenafil (REVATIO) 20 MG  "tablet, 1-2 tabs daily as needed for ed  Never use with nitroglycerin, terazosin or doxazosin., Disp: 30 tablet, Rfl: 0     sildenafil (VIAGRA) 50 MG tablet, Take 0.5-1 tablets (25-50 mg) by mouth daily as needed Take 30 min to 4 hours before intercourse To ER if chest pain, shortness of breath , prolonged erections, Disp: 30 tablet, Rfl: 1     tadalafil (CIALIS) 10 MG tablet, Take 0.5-1 tablets (5-10 mg) by mouth daily as needed for erectile dysfunction Never use with nitroglycerin, terazosin or doxazosin., Disp: 12 tablet, Rfl: 4    Allergies: No Known Allergies       REVIEW OF SYSTEMS:   CONSTITUTIONAL:NEGATIVE for fever, chills, night sweats  INTEGUMENTARY/SKIN: NEGATIVE for worrisome wound problems or redness.  MUSCULOSKELETAL:See HPI above  NEURO: NEGATIVE for weakness, dizziness or paresthesias    This document serves as a record of the services and decisions personally performed and made by Osvaldo Mccoy MD. It was created on his behalf by Soha Snyder, a trained medical scribe. The creation of this document is based the provider's statements to the medical scribe.    Scribe Soha Snyder 2:21 PM 6/13/2018        PHYSICAL EXAM:  BP (!) 173/93 (BP Location: Left arm)  Pulse 73  Ht 6' 1\" (1.854 m)  Wt 211 lb 3.2 oz (95.8 kg)  BMI 27.86 kg/m2   GENERAL APPEARANCE: healthy, alert, no distress  SKIN: no suspicious lesions or rashes  NEURO: Normal strength and tone, mentation intact and speech normal  PSYCH:  mentation appears normal and affect normal, not anxious  RESPIRATORY: No increased work of breathing.    Gait: antalgic left.    No Quad atrophy, strength normal.  Intact sensation deep peroneal nerve, superficial peroneal nerve, med/lat tibial nerve, sural nerve, saphenous nerve  Intact EHL, EDL, TA, FHL, GS, quadriceps hamstrings and hip flexors  Toes warm and well perfused, brisk capillary refill. Palpable 2+ dp pulses.  calf soft and nttp or squeeze.  Edema: none    right  KNEE EXAM:    Skin: intact, no " ecchymosis.  ROM: 0 degrees extension to 120 degrees flexion  Effusion: none   Tender: medial joint line.   Positive crepitus.  Palpable marginal osteophytes    Left knee exam:  Skin intact. No erythema or ecchymosis.  ROM: 0 extension to 110 flexion  Effusion: none  Tender to palpation medial joint line, lateral joint line  Positive medial and patello-femoral crepitus.  Palpable marginal osteophytes.      X-RAY: 3 views bilateral knees taken on 2/5/2018 were reviewed in clinic today.    Left: Advanced lateral compartment degenerative changes similar to the prior study, bone on bone with articular flattening, subchondral sclerosis. Moderate patellofemoral degenerative changes with large marginal osteophytes.     Right: Moderate patellofemoral degenerative changes, prominent marginal osteophyte. Marginal osteophyte formation in the lateral compartment without significant joint space loss.     Overall there is mild progression of degenerative changes since a comparison study.      Impression: Ayo Hernández is a 42 year old male with chronic bilateral knee pain, advanced primary osteoarthritis.      Plan:     * WB status: weightbearing per comfort.    * Rest  * Activity modification - avoid activities that aggravate symptoms.  * NSAIDS - regular use for inflammation, with food, as long as no contra-indications. Please discuss with pcp if needed.  * Ice twice daily to three times daily as needed.  * Compression wrap as needed.  * Elevation of extremity to reduce swelling as needed.  * Injections: risks and perceived benefits discussed today. Patient elects to proceed bilaterally.  * Tylenol as needed for pain  * return to clinic as needed.    Patient to follow up with Primary Care provider regarding elevated blood pressure.      PROCEDURE NOTE:  The risks, perceived benefits and potential complications (including but not limited to: bleeding, infection, pain, scar, damage to adjacent structures, atrophy or necrosis of  soft tissue, skin blanching, failure to relieve symptoms, worsening of symptoms, allergic reaction) of injection were discussed with the patient. Questions were addressed and answered.The patient elected to proceed. Written informed consent was obtained. The correct procedural site was identified and confirmed. A RIGHT knee intraarticular injection was performed using 2mL Kenalog-40 40mg per mL and 7mL (4mL 1% lidocaine, 3mL 0.25% marcaine)  of local anesthetic after sterile prep, to the correct procedural site. Sterile bandaid applied. This was tolerated well by the patient. No apparent complications. Did also discuss that if diabetic, recommend close monitoring of blood sugars over the next week as cortisone injections can temporarily elevate blood sugars.     The risks, perceived benefits and potential complications (including but not limited to: bleeding, infection, pain, scar, damage to adjacent structures, atrophy or necrosis of soft tissue, skin blanching, failure to relieve symptoms, worsening of symptoms, allergic reaction) of injection were discussed with the patient. Questions were addressed and answered.The patient elected to proceed. Written informed consent was obtained. The correct procedural site was identified and confirmed. A LEFT knee intraarticular injection was performed using 2mL Kenalog-40 40mg per mL and 7mL (4mL 1% lidocaine, 3mL 0.25% marcaine)  of local anesthetic after sterile prep, to the correct procedural site. Sterile bandaid applied. This was tolerated well by the patient. No apparent complications. Did also discuss that if diabetic, recommend close monitoring of blood sugars over the next week as cortisone injections can temporarily elevate blood sugars.    The information in this document, created by a scribe for me, accurately reflects the services I personally performed and the decisions made by me. I have reviewed and approved this document for accuracy.      Osvaldo Mccoy,  M.D., M.S.  Dept. of Orthopaedic Surgery  [facility]      The patient's Bilateral knees were prepped with betadine solution after verification of allergies. Area approximately 10 cm x 10 cm prepped in a sterile fashion. After injection, betadine removed with soap and water and band-aids applied.    4cc Lidocaine 1%  NDC 4325-5197-29, LOT [id number],  2020  3cc Bupivacaine 0.25% NDC 55150-167-10, LOT [id number],  2019  2cc Kenalog 40 NDC 8922-9673-51, LOT [id number],   injected into patient's Bilateral knees by:   Van Napoles PA-C, CAQ (Ortho)  Supervising Physician: Osvaldo Mccoy M.D., M.S.  Dept. of Orthopaedic Surgery  [facility]        Again, thank you for allowing me to participate in the care of your patient.        Sincerely,        Osvaldo Mccoy MD

## 2018-10-11 ENCOUNTER — OFFICE VISIT (OUTPATIENT)
Dept: ORTHOPEDICS | Facility: CLINIC | Age: 43
End: 2018-10-11
Payer: COMMERCIAL

## 2018-10-11 VITALS
BODY MASS INDEX: 28.49 KG/M2 | HEIGHT: 73 IN | DIASTOLIC BLOOD PRESSURE: 99 MMHG | SYSTOLIC BLOOD PRESSURE: 166 MMHG | WEIGHT: 215 LBS | RESPIRATION RATE: 16 BRPM

## 2018-10-11 DIAGNOSIS — M17.0 PRIMARY OSTEOARTHRITIS OF BOTH KNEES: Primary | ICD-10-CM

## 2018-10-11 DIAGNOSIS — M25.561 BILATERAL CHRONIC KNEE PAIN: ICD-10-CM

## 2018-10-11 DIAGNOSIS — G89.29 BILATERAL CHRONIC KNEE PAIN: ICD-10-CM

## 2018-10-11 DIAGNOSIS — M25.562 BILATERAL CHRONIC KNEE PAIN: ICD-10-CM

## 2018-10-11 PROCEDURE — 20610 DRAIN/INJ JOINT/BURSA W/O US: CPT | Mod: 50 | Performed by: ORTHOPAEDIC SURGERY

## 2018-10-11 RX ADMIN — BUPIVACAINE HYDROCHLORIDE 3 ML: 2.5 INJECTION, SOLUTION INFILTRATION; PERINEURAL at 15:54

## 2018-10-11 RX ADMIN — TRIAMCINOLONE ACETONIDE 80 MG: 40 INJECTION, SUSPENSION INTRA-ARTICULAR; INTRAMUSCULAR at 15:54

## 2018-10-11 RX ADMIN — LIDOCAINE HYDROCHLORIDE 4 ML: 10 INJECTION, SOLUTION INFILTRATION; PERINEURAL at 15:54

## 2018-10-11 ASSESSMENT — PAIN SCALES - GENERAL: PAINLEVEL: MODERATE PAIN (4)

## 2018-10-11 NOTE — PROGRESS NOTES
Chief Complaint   Patient presents with     Knee Pain     bilateral knee oa. Last injections on 6/13/18 with Kenalog. Would like repeat injections today. these worked well up until 1-2 weeks ago       HISTORY OF PRESENT ILLNESS:  Ayo Hernández is a 43 year old male seen for chronic bilateral knee pain, advanced primary osteoarthritis.  He returns today with moderate pain, rated a 5/10. Denies any swelling. He reports he can tell when its time. He will have stiffness with standing. Also has pain with getting up after prolonged sitting. His last injections were on 6/13/2018 with Kenalog. These injections worked great, with pain coming back about 1-2 weeks ago. He would like repeat injections today. Patient continues to be very active.      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      No past medical history on file.    Past Surgical History:   Procedure Laterality Date     ARTHROSCOPY KNEE Right 7/15/2016    Procedure: ARTHROSCOPY KNEE;  Surgeon: Osvaldo Mccoy MD;  Location:  OR     ORTHOPEDIC SURGERY  08.2012    left knee, arthroscopy     ORTHOPEDIC SURGERY  07.15.2016    right knee       Medications:   Current Outpatient Prescriptions:      amLODIPine (NORVASC) 5 MG tablet, Take 1 tablet (5 mg) by mouth daily, Disp: 90 tablet, Rfl: 3     HYDROcodone-acetaminophen (NORCO) 5-325 MG per tablet, Take 1-2 tablets by mouth every 4 hours as needed for other (Moderate to Severe Pain), Disp: 40 tablet, Rfl: 0     ibuprofen (ADVIL,MOTRIN) 200 MG tablet, Take 200 mg by mouth every 4 hours as needed., Disp: , Rfl:      sildenafil (REVATIO) 20 MG tablet, 1-2 tabs daily as needed for ed  Never use with nitroglycerin, terazosin or doxazosin., Disp: 30 tablet, Rfl: 0     sildenafil (VIAGRA) 50 MG tablet, Take 0.5-1 tablets (25-50 mg) by mouth daily as needed Take 30 min to 4 hours before intercourse To ER if chest pain, shortness  "of breath , prolonged erections, Disp: 30 tablet, Rfl: 1     tadalafil (CIALIS) 10 MG tablet, Take 0.5-1 tablets (5-10 mg) by mouth daily as needed for erectile dysfunction Never use with nitroglycerin, terazosin or doxazosin., Disp: 12 tablet, Rfl: 4    Allergies: No Known Allergies       REVIEW OF SYSTEMS:   CONSTITUTIONAL:NEGATIVE for fever, chills, night sweats  INTEGUMENTARY/SKIN: NEGATIVE for worrisome wound problems or redness.  MUSCULOSKELETAL:See HPI above  NEURO: NEGATIVE for weakness, dizziness or paresthesias    This document serves as a record of the services and decisions personally performed and made by Osvaldo Mccoy MD. It was created on his behalf by Soha Snyder, a trained medical scribe. The creation of this document is based the provider's statements to the medical scribe.    Scribe Soha Snyder 3:21 PM 10/11/2018       PHYSICAL EXAM:  BP (!) 166/99 (BP Location: Right arm)  Resp 16  Ht 6' 1\" (1.854 m)  Wt 215 lb (97.5 kg)  BMI 28.37 kg/m2   GENERAL APPEARANCE: healthy, alert, no distress  SKIN: no suspicious lesions or rashes  NEURO: Normal strength and tone, mentation intact and speech normal  PSYCH:  mentation appears normal and affect normal, not anxious  RESPIRATORY: No increased work of breathing.    Gait: varus    No Quad atrophy, strength normal.  Intact sensation deep peroneal nerve, superficial peroneal nerve, med/lat tibial nerve, sural nerve, saphenous nerve  Intact EHL, EDL, TA, FHL, GS, quadriceps hamstrings and hip flexors  Toes warm and well perfused, brisk capillary refill. Palpable 2+ dp pulses.  calf soft and nttp or squeeze.  Edema: none    right  KNEE EXAM:    Skin: intact, no ecchymosis.  ROM: 0 degrees extension to 120 degrees flexion  Effusion: none   Tender: medial joint line.   Positive crepitus.  Palpable marginal osteophytes    Left knee exam:  Skin intact. No erythema or ecchymosis.  ROM: 0 extension to 110 flexion  Effusion: none  Tender to palpation medial joint " line, lateral joint line  Positive medial and patello-femoral crepitus.  Palpable marginal osteophytes.      X-RAY: no new images today.    3 views bilateral knees taken on 2/5/2018 were reviewed in clinic today.    Left: Advanced lateral compartment degenerative changes similar to the prior study, bone on bone with articular flattening, subchondral sclerosis. Moderate patellofemoral degenerative changes with large marginal osteophytes.     Right: Moderate patellofemoral degenerative changes, prominent marginal osteophyte. Marginal osteophyte formation in the lateral compartment without significant joint space loss.     Overall there is mild progression of degenerative changes since a comparison study.      Impression: Ayo Hernández is a 43 year old male with chronic bilateral knee pain, advanced primary osteoarthritis.      Plan:   * WB status: weightbearing per comfort.    * Rest  * Activity modification - avoid activities that aggravate symptoms.  * NSAIDS - regular use for inflammation, with food, as long as no contra-indications. Please discuss with pcp if needed.  * Ice twice daily to three times daily as needed.  * Compression wrap as needed.  * Elevation of extremity to reduce swelling as needed.  * Injections: risks and perceived benefits discussed today. Patient elects to proceed bilaterally.  * Tylenol as needed for pain  * return to clinic as needed  * Patient to follow up with Primary Care provider regarding elevated blood pressure     PROCEDURE NOTE:  The risks, perceived benefits and potential complications (including but not limited to: bleeding, infection, pain, scar, damage to adjacent structures, atrophy or necrosis of soft tissue, skin blanching, failure to relieve symptoms, worsening of symptoms, allergic reaction) of injection were discussed with the patient. Questions were addressed and answered.The patient elected to proceed. Written informed consent was obtained. The correct procedural site  was identified and confirmed. A RIGHT knee intraarticular injection was performed using 2mL Kenalog-40 40mg per mL and 7mL (4mL 1% lidocaine, 3mL 0.25% marcaine)  of local anesthetic after sterile prep, to the correct procedural site. Sterile bandaid applied. This was tolerated well by the patient. No apparent complications. Did also discuss that if diabetic, recommend close monitoring of blood sugars over the next week as cortisone injections can temporarily elevate blood sugars.     The risks, perceived benefits and potential complications (including but not limited to: bleeding, infection, pain, scar, damage to adjacent structures, atrophy or necrosis of soft tissue, skin blanching, failure to relieve symptoms, worsening of symptoms, allergic reaction) of injection were discussed with the patient. Questions were addressed and answered.The patient elected to proceed. Written informed consent was obtained. The correct procedural site was identified and confirmed. A LEFT knee intraarticular injection was performed using 2mL Kenalog-40 40mg per mL and 7mL (4mL 1% lidocaine, 3mL 0.25% marcaine)  of local anesthetic after sterile prep, to the correct procedural site. Sterile bandaid applied. This was tolerated well by the patient. No apparent complications. Did also discuss that if diabetic, recommend close monitoring of blood sugars over the next week as cortisone injections can temporarily elevate blood sugars.    The information in this document, created by a scribe for me, accurately reflects the services I personally performed and the decisions made by me. I have reviewed and approved this document for accuracy.      Osvaldo Mccoy M.D., M.S.  Dept. of Orthopaedic Surgery  [facility]    Large Joint Injection/Arthocentesis  Date/Time: 10/11/2018 3:54 PM  Performed by: GEOVANNY MORAN  Authorized by: OSVALDO MCCOY YOSEF     Indications:  Pain and osteoarthritis  Needle Size:  22 G  Approach:   Anteromedial  Location:  Knee  Laterality:  Bilateral  Site:  Bilateral knee  Medications (Right):  3 mL bupivacaine 0.25 %; 4 mL lidocaine 1 %; 80 mg triamcinolone acetonide 40 MG/ML  Medications (Left):  3 mL bupivacaine 0.25 %; 4 mL lidocaine 1 %; 80 mg triamcinolone acetonide 40 MG/ML  Outcome:  Tolerated well, no immediate complications  Procedure discussed: discussed risks, benefits, and alternatives    Consent Given by:  Patient  Prep: patient was prepped and draped in usual sterile fashion

## 2018-10-11 NOTE — MR AVS SNAPSHOT
"              After Visit Summary   10/11/2018    Ayo Hernández    MRN: [id number]           Patient Information     Date Of Birth          1975        Visit Information        Provider Department      10/11/2018 3:00 PM Osvaldo Mccoy MD [facility]        Today's Diagnoses     Primary osteoarthritis of both knees    -  1    Bilateral chronic knee pain           Follow-ups after your visit        Follow-up notes from your care team     Return if symptoms worsen or fail to improve.      Who to contact     If you have questions or need follow up information about today's clinic visit or your schedule please contact [facility] directly at [phone].  Normal or non-critical lab and imaging results will be communicated to you by MyChart, letter or phone within 4 business days after the clinic has received the results. If you do not hear from us within 7 days, please contact the clinic through [employer] or phone. If you have a critical or abnormal lab result, we will notify you by phone as soon as possible.  Submit refill requests through [employer] or call your pharmacy and they will forward the refill request to us. Please allow 3 business days for your refill to be completed.          Additional Information About Your Visit        MyChart Information     [employer] gives you secure access to your electronic health record. If you see a primary care provider, you can also send messages to your care team and make appointments. If you have questions, please call your primary care clinic.  If you do not have a primary care provider, please call [phone] and they will assist you.        Care EveryWhere ID     This is your Care EveryWhere ID. This could be used by other organizations to access your [facility] medical records  [id number]        Your Vitals Were     Respirations Height BMI (Body Mass Index)             16 6' 1\" (1.854 m) 28.37 kg/m2       "    Blood Pressure from Last 3 Encounters:   10/11/18 (!) 166/99   06/13/18 (!) 173/93   10/20/17 140/90    Weight from Last 3 Encounters:   10/11/18 215 lb (97.5 kg)   06/13/18 211 lb 3.2 oz (95.8 kg)   02/05/18 225 lb 12.8 oz (102.4 kg)              We Performed the Following     Large Joint Injection/Arthocentesis        Primary Care Provider Office Phone # Fax #    Juan Pablo Johny Kimbrough [phone] [address] [facility] [address]        Equal Access to Services     [facility]: Hadii aad edward hadasho Sojesus, waaxda luqadaha, qaybta kaalmada adeegyada, mercedes webster . So [facility] [phone].    ATENCIÓN: Si habla español, tiene a chaney disposición servicios gratuitos de asistencia lingüística. Llame al [phone].    We comply with applicable federal civil rights laws and [place] laws. We do not discriminate on the basis of race, color, national origin, age, disability, sex, sexual orientation, or gender identity.            Thank you!     Thank you for choosing [facility] [place]  for your care. Our goal is always to provide you with excellent care. Hearing back from our patients is one way we can continue to improve our services. Please take a few minutes to complete the written survey that you may receive in the mail after your visit with us. Thank you!             Your Updated Medication List - Protect others around you: Learn how to safely use, store and throw away your medicines at www.disposemymeds.org.          This list is accurate as of 10/11/18 11:59 PM.  Always use your most recent med list.                   Brand Name Dispense Instructions for use Diagnosis    amLODIPine 5 MG tablet    NORVASC    90 tablet    Take 1 tablet (5 mg) by mouth daily    Elevated blood pressure reading without diagnosis of hypertension       HYDROcodone-acetaminophen 5-325 MG per tablet    NORCO    40 tablet    Take 1-2 tablets by mouth every 4  hours as needed for other (Moderate to Severe Pain)    Tear of lateral meniscus of right knee, current, subsequent encounter       ibuprofen 200 MG tablet    ADVIL/MOTRIN     Take 200 mg by mouth every 4 hours as needed.        sildenafil 20 MG tablet    REVATIO    30 tablet    1-2 tabs daily as needed for ed  Never use with nitroglycerin, terazosin or doxazosin.    Erectile dysfunction, unspecified erectile dysfunction type       sildenafil 50 MG tablet    VIAGRA    30 tablet    Take 0.5-1 tablets (25-50 mg) by mouth daily as needed Take 30 min to 4 hours before intercourse To ER if chest pain, shortness of breath , prolonged erections    Erectile dysfunction, unspecified erectile dysfunction type       tadalafil 10 MG tablet    CIALIS    12 tablet    Take 0.5-1 tablets (5-10 mg) by mouth daily as needed for erectile dysfunction Never use with nitroglycerin, terazosin or doxazosin.    Other male erectile dysfunction

## 2018-10-11 NOTE — LETTER
10/11/2018         RE: Ayo Hernández  [address]  [facility] [address]        Dear Colleague,    Thank you for referring your patient, Ayo Hernánedz, to the [facility] [place]. Please see a copy of my visit note below.    Chief Complaint   Patient presents with     Knee Pain     bilateral knee oa. Last injections on 6/13/18 with Kenalog. Would like repeat injections today. these worked well up until 1-2 weeks ago       HISTORY OF PRESENT ILLNESS:  Ayo Hernández is a 43 year old male seen for chronic bilateral knee pain, advanced primary osteoarthritis.  He returns today with moderate pain, rated a 5/10. Denies any swelling. He reports he can tell when its time. He will have stiffness with standing. Also has pain with getting up after prolonged sitting. His last injections were on 6/13/2018 with Kenalog. These injections worked great, with pain coming back about 1-2 weeks ago. He would like repeat injections today. Patient continues to be very active.      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      No past medical history on file.    Past Surgical History:   Procedure Laterality Date     ARTHROSCOPY KNEE Right 7/15/2016    Procedure: ARTHROSCOPY KNEE;  Surgeon: Osvaldo Mccoy MD;  Location:  OR     ORTHOPEDIC SURGERY  08.2012    left knee, arthroscopy     ORTHOPEDIC SURGERY  07.15.2016    right knee       Medications:   Current Outpatient Prescriptions:      amLODIPine (NORVASC) 5 MG tablet, Take 1 tablet (5 mg) by mouth daily, Disp: 90 tablet, Rfl: 3     HYDROcodone-acetaminophen (NORCO) 5-325 MG per tablet, Take 1-2 tablets by mouth every 4 hours as needed for other (Moderate to Severe Pain), Disp: 40 tablet, Rfl: 0     ibuprofen (ADVIL,MOTRIN) 200 MG tablet, Take 200 mg by mouth every 4 hours as needed., Disp: , Rfl:      sildenafil (REVATIO) 20 MG tablet, 1-2 tabs daily as  "needed for ed  Never use with nitroglycerin, terazosin or doxazosin., Disp: 30 tablet, Rfl: 0     sildenafil (VIAGRA) 50 MG tablet, Take 0.5-1 tablets (25-50 mg) by mouth daily as needed Take 30 min to 4 hours before intercourse To ER if chest pain, shortness of breath , prolonged erections, Disp: 30 tablet, Rfl: 1     tadalafil (CIALIS) 10 MG tablet, Take 0.5-1 tablets (5-10 mg) by mouth daily as needed for erectile dysfunction Never use with nitroglycerin, terazosin or doxazosin., Disp: 12 tablet, Rfl: 4    Allergies: No Known Allergies       REVIEW OF SYSTEMS:   CONSTITUTIONAL:NEGATIVE for fever, chills, night sweats  INTEGUMENTARY/SKIN: NEGATIVE for worrisome wound problems or redness.  MUSCULOSKELETAL:See HPI above  NEURO: NEGATIVE for weakness, dizziness or paresthesias    This document serves as a record of the services and decisions personally performed and made by Osvaldo Mccoy MD. It was created on his behalf by Soha Snyder, a trained medical scribe. The creation of this document is based the provider's statements to the medical scribe.    Scribe Soha Snyder 3:21 PM 10/11/2018       PHYSICAL EXAM:  BP (!) 166/99 (BP Location: Right arm)  Resp 16  Ht 6' 1\" (1.854 m)  Wt 215 lb (97.5 kg)  BMI 28.37 kg/m2   GENERAL APPEARANCE: healthy, alert, no distress  SKIN: no suspicious lesions or rashes  NEURO: Normal strength and tone, mentation intact and speech normal  PSYCH:  mentation appears normal and affect normal, not anxious  RESPIRATORY: No increased work of breathing.    Gait: varus    No Quad atrophy, strength normal.  Intact sensation deep peroneal nerve, superficial peroneal nerve, med/lat tibial nerve, sural nerve, saphenous nerve  Intact EHL, EDL, TA, FHL, GS, quadriceps hamstrings and hip flexors  Toes warm and well perfused, brisk capillary refill. Palpable 2+ dp pulses.  calf soft and nttp or squeeze.  Edema: none    right  KNEE EXAM:    Skin: intact, no ecchymosis.  ROM: 0 degrees extension to " 120 degrees flexion  Effusion: none   Tender: medial joint line.   Positive crepitus.  Palpable marginal osteophytes    Left knee exam:  Skin intact. No erythema or ecchymosis.  ROM: 0 extension to 110 flexion  Effusion: none  Tender to palpation medial joint line, lateral joint line  Positive medial and patello-femoral crepitus.  Palpable marginal osteophytes.      X-RAY: no new images today.    3 views bilateral knees taken on 2/5/2018 were reviewed in clinic today.    Left: Advanced lateral compartment degenerative changes similar to the prior study, bone on bone with articular flattening, subchondral sclerosis. Moderate patellofemoral degenerative changes with large marginal osteophytes.     Right: Moderate patellofemoral degenerative changes, prominent marginal osteophyte. Marginal osteophyte formation in the lateral compartment without significant joint space loss.     Overall there is mild progression of degenerative changes since a comparison study.      Impression: Ayo Hernández is a 43 year old male with chronic bilateral knee pain, advanced primary osteoarthritis.      Plan:   * WB status: weightbearing per comfort.    * Rest  * Activity modification - avoid activities that aggravate symptoms.  * NSAIDS - regular use for inflammation, with food, as long as no contra-indications. Please discuss with pcp if needed.  * Ice twice daily to three times daily as needed.  * Compression wrap as needed.  * Elevation of extremity to reduce swelling as needed.  * Injections: risks and perceived benefits discussed today. Patient elects to proceed bilaterally.  * Tylenol as needed for pain  * return to clinic as needed  * Patient to follow up with Primary Care provider regarding elevated blood pressure     PROCEDURE NOTE:  The risks, perceived benefits and potential complications (including but not limited to: bleeding, infection, pain, scar, damage to adjacent structures, atrophy or necrosis of soft tissue, skin  blanching, failure to relieve symptoms, worsening of symptoms, allergic reaction) of injection were discussed with the patient. Questions were addressed and answered.The patient elected to proceed. Written informed consent was obtained. The correct procedural site was identified and confirmed. A RIGHT knee intraarticular injection was performed using 2mL Kenalog-40 40mg per mL and 7mL (4mL 1% lidocaine, 3mL 0.25% marcaine)  of local anesthetic after sterile prep, to the correct procedural site. Sterile bandaid applied. This was tolerated well by the patient. No apparent complications. Did also discuss that if diabetic, recommend close monitoring of blood sugars over the next week as cortisone injections can temporarily elevate blood sugars.     The risks, perceived benefits and potential complications (including but not limited to: bleeding, infection, pain, scar, damage to adjacent structures, atrophy or necrosis of soft tissue, skin blanching, failure to relieve symptoms, worsening of symptoms, allergic reaction) of injection were discussed with the patient. Questions were addressed and answered.The patient elected to proceed. Written informed consent was obtained. The correct procedural site was identified and confirmed. A LEFT knee intraarticular injection was performed using 2mL Kenalog-40 40mg per mL and 7mL (4mL 1% lidocaine, 3mL 0.25% marcaine)  of local anesthetic after sterile prep, to the correct procedural site. Sterile bandaid applied. This was tolerated well by the patient. No apparent complications. Did also discuss that if diabetic, recommend close monitoring of blood sugars over the next week as cortisone injections can temporarily elevate blood sugars.    The information in this document, created by a scribe for me, accurately reflects the services I personally performed and the decisions made by me. I have reviewed and approved this document for accuracy.      Osvaldo Mccoy M.D., M.S.  Dept. of  Orthopaedic Surgery  [facility]    Large Joint Injection/Arthocentesis  Date/Time: 10/11/2018 3:54 PM  Performed by: GEOVANNY MORAN  Authorized by: SABRINA MCCOY     Indications:  Pain and osteoarthritis  Needle Size:  22 G  Approach:  Anteromedial  Location:  Knee  Laterality:  Bilateral  Site:  Bilateral knee  Medications (Right):  3 mL bupivacaine 0.25 %; 4 mL lidocaine 1 %; 80 mg triamcinolone acetonide 40 MG/ML  Medications (Left):  3 mL bupivacaine 0.25 %; 4 mL lidocaine 1 %; 80 mg triamcinolone acetonide 40 MG/ML  Outcome:  Tolerated well, no immediate complications  Procedure discussed: discussed risks, benefits, and alternatives    Consent Given by:  Patient  Prep: patient was prepped and draped in usual sterile fashion            Again, thank you for allowing me to participate in the care of your patient.        Sincerely,        Sabrina Mccoy MD

## 2018-10-12 RX ORDER — BUPIVACAINE HYDROCHLORIDE 2.5 MG/ML
3 INJECTION, SOLUTION INFILTRATION; PERINEURAL
Status: DISCONTINUED | OUTPATIENT
Start: 2018-10-11 | End: 2020-03-23

## 2018-10-12 RX ORDER — TRIAMCINOLONE ACETONIDE 40 MG/ML
80 INJECTION, SUSPENSION INTRA-ARTICULAR; INTRAMUSCULAR
Status: DISCONTINUED | OUTPATIENT
Start: 2018-10-11 | End: 2019-01-15

## 2018-10-12 RX ORDER — LIDOCAINE HYDROCHLORIDE 10 MG/ML
4 INJECTION, SOLUTION INFILTRATION; PERINEURAL
Status: DISCONTINUED | OUTPATIENT
Start: 2018-10-11 | End: 2020-03-23

## 2018-12-11 ENCOUNTER — OFFICE VISIT (OUTPATIENT)
Dept: FAMILY MEDICINE | Facility: CLINIC | Age: 43
End: 2018-12-11
Payer: COMMERCIAL

## 2018-12-11 VITALS — SYSTOLIC BLOOD PRESSURE: 147 MMHG | DIASTOLIC BLOOD PRESSURE: 71 MMHG

## 2018-12-11 DIAGNOSIS — Z12.5 SCREENING PSA (PROSTATE SPECIFIC ANTIGEN): ICD-10-CM

## 2018-12-11 DIAGNOSIS — Z00.00 ROUTINE HISTORY AND PHYSICAL EXAMINATION OF ADULT: Primary | ICD-10-CM

## 2018-12-11 DIAGNOSIS — Z12.11 SPECIAL SCREENING FOR MALIGNANT NEOPLASMS, COLON: ICD-10-CM

## 2018-12-11 DIAGNOSIS — I10 BENIGN ESSENTIAL HYPERTENSION: ICD-10-CM

## 2018-12-11 DIAGNOSIS — N52.9 ERECTILE DYSFUNCTION, UNSPECIFIED ERECTILE DYSFUNCTION TYPE: ICD-10-CM

## 2018-12-11 PROCEDURE — 99396 PREV VISIT EST AGE 40-64: CPT | Performed by: FAMILY MEDICINE

## 2018-12-11 RX ORDER — SILDENAFIL CITRATE 20 MG/1
TABLET ORAL
Qty: 20 TABLET | Refills: 3 | Status: SHIPPED | OUTPATIENT
Start: 2018-12-11 | End: 2020-06-09

## 2018-12-11 RX ORDER — AMLODIPINE BESYLATE 10 MG/1
10 TABLET ORAL DAILY
Qty: 90 TABLET | Refills: 1 | Status: SHIPPED | OUTPATIENT
Start: 2018-12-11 | End: 2019-01-15

## 2018-12-11 RX ORDER — AMLODIPINE BESYLATE 10 MG/1
10 TABLET ORAL DAILY
Qty: 90 TABLET | Refills: 1 | Status: CANCELLED | OUTPATIENT
Start: 2018-12-11 | End: 2019-12-11

## 2018-12-11 ASSESSMENT — ENCOUNTER SYMPTOMS
MYALGIAS: 0
FREQUENCY: 0
HEMATOCHEZIA: 0
SORE THROAT: 0
ABDOMINAL PAIN: 0
DYSURIA: 0
HEADACHES: 0
JOINT SWELLING: 0
PALPITATIONS: 0
WEAKNESS: 0
PARESTHESIAS: 0
DIARRHEA: 0
NAUSEA: 0
DIZZINESS: 0
CHILLS: 0
CONSTIPATION: 0
SHORTNESS OF BREATH: 0
COUGH: 0
ARTHRALGIAS: 0
HEMATURIA: 0
HEARTBURN: 0
EYE PAIN: 0

## 2018-12-11 ASSESSMENT — MIFFLIN-ST. JEOR: SCORE: 1905.04

## 2018-12-11 NOTE — NURSING NOTE
"Chief Complaint   Patient presents with     Physical       Initial BP (!) 160/94   Pulse (P) 69   Temp (P) 97  F (36.1  C) (Oral)   Resp (P) 20   Ht (P) 1.854 m (6' 1\")   Wt (P) 96.6 kg (213 lb)   SpO2 (P) 97%   BMI (P) 28.10 kg/m   Estimated body mass index is 28.1 kg/m  (pended) as calculated from the following:    Height as of this encounter: (P) 1.854 m (6' 1\").    Weight as of this encounter: (P) 96.6 kg (213 lb).    Rosalee Gaspar CMA    "

## 2018-12-11 NOTE — PROGRESS NOTES
SUBJECTIVE:   CC: Ayo Hernández is an 43 year old male who presents for preventative health visit.     Physical   Annual:     Getting at least 3 servings of Calcium per day:  Yes    Bi-annual eye exam:  Yes    Dental care twice a year:  Yes    Sleep apnea or symptoms of sleep apnea:  None    Diet:  Carbohydrate counting    Frequency of exercise:  6-7 days/week    Duration of exercise:  45-60 minutes    Taking medications regularly:  Yes    Medication side effects:  None    Additional concerns today:  No          PROBLEMS TO ADD ON...    Today's PHQ-2 Score:   PHQ-2 ( 1999 [employer]) 10/20/2017   Q1: Little interest or pleasure in doing things 0   Q2: Feeling down, depressed or hopeless 0   PHQ-2 Score 0   Q1: Little interest or pleasure in doing things Not at all   Q2: Feeling down, depressed or hopeless Not at all   PHQ-2 Score 0       Abuse: Current or Past(Physical, Sexual or Emotional)- No  Do you feel safe in your environment? Yes    Social History     Tobacco Use     Smoking status: Never Smoker     Smokeless tobacco: Former User     Types: Snuff   Substance Use Topics     Alcohol use: Yes     No flowsheet data found.    Last PSA:   PSA   Date Value Ref Range Status   10/20/2017 0.28 0 - 4 ug/L Final     Comment:     Assay Method:  Chemiluminescence using Siemens Vista analyzer       Reviewed orders with patient. Reviewed health maintenance and updated orders accordingly - Yes  Labs reviewed in EPIC    Reviewed and updated as needed this visit by clinical staff         Reviewed and updated as needed this visit by Provider      OBJECTIVE:   There were no vitals taken for this visit.    Physical Exam      ASSESSMENT/PLAN:       COUNSELING:     ATP IV Guidelines  Pooled Cohorts Equation Calculator  FRAX Risk Assessment  ICSI Preventive Guidelines  Dietary Guidelines for Americans, 2010  USDA's MyPlate  ASA Prophylaxis  Lung CA Screening    Juan Pablo Kimbrough MD  [facility]

## 2018-12-11 NOTE — PROGRESS NOTES
"SUBJECTIVE:  Ayo Hernández, a 43 year old male scheduled an appointment to discuss the following issues:  CPE  Outside blood pressure readings still a little high. Exercise regularly. Cardio/weight lifting.   Family history htn. Dad cva late 40's.   No chest pain or shortness of breath.   No added sodium. Non-fasting. Water intake good.   Coffee - 2-3 cups/day. ALCOHOL 2-3 vodka/day night.  No nausea, vomiting or diarrhea. No black or bloody stools. Mom with colon cancer - 71yo.   No urine changes or hematuria.   No rash/mole changes.   No major burning issues. No vitaminD supplement. Eye 1.5 years ago ok will recheck this winter. Teeth ok with dentist. No major snoring.   No side effects from norvasc.     Medical, social, surgical, and family histories reviewed.    ROS:  All other ROS negative.     OBJECTIVE:  /71   Pulse (P) 69   Temp (P) 97  F (36.1  C) (Oral)   Resp (P) 20   Ht (P) 1.854 m (6' 1\")   Wt (P) 96.6 kg (213 lb)   SpO2 (P) 97%   BMI (P) 28.10 kg/m    EXAM:  GENERAL APPEARANCE: healthy, alert and no distress  EYES: EOMI,  PERRL  HENT: ear canals and TM's normal and nose and mouth without ulcers or lesions  NECK: no adenopathy, no asymmetry, masses, or scars and thyroid normal to palpation  RESP: lungs clear to auscultation - no rales, rhonchi or wheezes  CV: regular rates and rhythm, normal S1 S2, no S3 or S4 and no murmur, click or rub -  ABDOMEN:  soft, nontender, no HSM or masses and bowel sounds normal  GU_male: patient deferred exam. Denies concerns.   MS: extremities normal- no gross deformities noted, no evidence of inflammation in joints, FROM in all extremities.  SKIN: no suspicious lesions or rashes  NEURO: Normal strength and tone, sensory exam grossly normal, mentation intact and speech normal  PSYCH: mentation appears normal and affect normal/bright  LYMPHATICS: No axillary, cervical, inguinal, or supraclavicular nodes       ASSESSMENT / PLAN:  (Z00.00) Routine history and " physical examination of adult  (primary encounter diagnosis)  Comment: generally healthy and normal exam/  Plan: future labs pended. Reviewed self mole/testicle check handout.  vitaminD supplement. Limit ALCOHOL and lower caffeine. Call/email with questions/concerns.     (N52.9) Erectile dysfunction, unspecified erectile dysfunction type  Plan: sildenafil (REVATIO) 20 MG tablet        To ER if chest pain, shortness of breath , prolonged erections      (I10) Benign essential hypertension  Comment: needs help  Plan: RN HTN MGMT, amLODIPine (NORVASC) 10 MG tablet        Follow-up htn rn and due fasting labs at time. Limit sodium/salt and continue exercise. Limit ALCOHOL. Double norvasc and add lisinopril if worse - titrate up to 20mg if needed.       Answers for HPI/ROS submitted by the patient on 12/11/2018   Annual Exam:  Frequency of exercise:: 6-7 days/week  Getting at least 3 servings of Calcium per day:: Yes  Diet:: Carbohydrate counting  Taking medications regularly:: Yes  Medication side effects:: None  Bi-annual eye exam:: Yes  Dental care twice a year:: Yes  Sleep apnea or symptoms of sleep apnea:: None  Negative for the following: abdominal pain, Blood in stool, Blood in urine, chest pain, chills, congestion, constipation, cough, diarrhea, dizziness, ear pain, eye pain, nervous/anxious, fever, frequency, genital sores, headaches, hearing loss, heartburn, arthralgias, joint swelling, peripheral edema, mood changes, myalgias, nausea, dysuria, palpitations, Skin sensation changes, sore throat, urgency, rash, shortness of breath, visual disturbance, weakness  impotence: No  penile discharge: No  Additional concerns today:: No  Duration of exercise:: 45-60 minutes

## 2018-12-19 ENCOUNTER — MYC MEDICAL ADVICE (OUTPATIENT)
Dept: FAMILY MEDICINE | Facility: CLINIC | Age: 43
End: 2018-12-19

## 2018-12-27 ENCOUNTER — TELEPHONE (OUTPATIENT)
Dept: FAMILY MEDICINE | Facility: CLINIC | Age: 43
End: 2018-12-27

## 2018-12-27 ENCOUNTER — ALLIED HEALTH/NURSE VISIT (OUTPATIENT)
Dept: NURSING | Facility: CLINIC | Age: 43
End: 2018-12-27
Payer: COMMERCIAL

## 2018-12-27 VITALS
WEIGHT: 213 LBS | DIASTOLIC BLOOD PRESSURE: 103 MMHG | TEMPERATURE: 97.4 F | RESPIRATION RATE: 12 BRPM | BODY MASS INDEX: 28.1 KG/M2 | SYSTOLIC BLOOD PRESSURE: 161 MMHG | HEART RATE: 65 BPM | OXYGEN SATURATION: 98 %

## 2018-12-27 DIAGNOSIS — R03.0 ELEVATED BLOOD PRESSURE READING WITHOUT DIAGNOSIS OF HYPERTENSION: Primary | ICD-10-CM

## 2018-12-27 DIAGNOSIS — I10 BENIGN ESSENTIAL HYPERTENSION: ICD-10-CM

## 2018-12-27 DIAGNOSIS — Z12.5 SCREENING PSA (PROSTATE SPECIFIC ANTIGEN): ICD-10-CM

## 2018-12-27 DIAGNOSIS — Z00.00 ROUTINE HISTORY AND PHYSICAL EXAMINATION OF ADULT: ICD-10-CM

## 2018-12-27 LAB
ALBUMIN SERPL-MCNC: 3.7 G/DL (ref 3.4–5)
ALP SERPL-CCNC: 80 U/L (ref 40–150)
ALT SERPL W P-5'-P-CCNC: 34 U/L (ref 0–70)
ANION GAP SERPL CALCULATED.3IONS-SCNC: 5 MMOL/L (ref 3–14)
AST SERPL W P-5'-P-CCNC: 37 U/L (ref 0–45)
BILIRUB SERPL-MCNC: 0.7 MG/DL (ref 0.2–1.3)
BUN SERPL-MCNC: 21 MG/DL (ref 7–30)
CALCIUM SERPL-MCNC: 9 MG/DL (ref 8.5–10.1)
CHLORIDE SERPL-SCNC: 103 MMOL/L (ref 94–109)
CHOLEST SERPL-MCNC: 217 MG/DL
CO2 SERPL-SCNC: 28 MMOL/L (ref 20–32)
CREAT SERPL-MCNC: 0.76 MG/DL (ref 0.66–1.25)
ERYTHROCYTE [DISTWIDTH] IN BLOOD BY AUTOMATED COUNT: 14.6 % (ref 10–15)
GFR SERPL CREATININE-BSD FRML MDRD: >90 ML/MIN/{1.73_M2}
GLUCOSE SERPL-MCNC: 97 MG/DL (ref 70–99)
HCT VFR BLD AUTO: 42.6 % (ref 40–53)
HDLC SERPL-MCNC: 126 MG/DL
HGB BLD-MCNC: 14.6 G/DL (ref 13.3–17.7)
LDLC SERPL CALC-MCNC: 84 MG/DL
MCH RBC QN AUTO: 31.1 PG (ref 26.5–33)
MCHC RBC AUTO-ENTMCNC: 34.3 G/DL (ref 31.5–36.5)
MCV RBC AUTO: 91 FL (ref 78–100)
NONHDLC SERPL-MCNC: 91 MG/DL
PLATELET # BLD AUTO: 213 10E9/L (ref 150–450)
POTASSIUM SERPL-SCNC: 4.7 MMOL/L (ref 3.4–5.3)
PROT SERPL-MCNC: 7.1 G/DL (ref 6.8–8.8)
PSA SERPL-ACNC: 0.27 UG/L (ref 0–4)
RBC # BLD AUTO: 4.7 10E12/L (ref 4.4–5.9)
SODIUM SERPL-SCNC: 136 MMOL/L (ref 133–144)
TRIGL SERPL-MCNC: 36 MG/DL
WBC # BLD AUTO: 6.3 10E9/L (ref 4–11)

## 2018-12-27 PROCEDURE — 99211 OFF/OP EST MAY X REQ PHY/QHP: CPT

## 2018-12-27 PROCEDURE — 36415 COLL VENOUS BLD VENIPUNCTURE: CPT | Performed by: FAMILY MEDICINE

## 2018-12-27 PROCEDURE — 80061 LIPID PANEL: CPT | Performed by: FAMILY MEDICINE

## 2018-12-27 PROCEDURE — 80053 COMPREHEN METABOLIC PANEL: CPT | Performed by: FAMILY MEDICINE

## 2018-12-27 PROCEDURE — G0103 PSA SCREENING: HCPCS | Performed by: FAMILY MEDICINE

## 2018-12-27 PROCEDURE — 85027 COMPLETE CBC AUTOMATED: CPT | Performed by: FAMILY MEDICINE

## 2018-12-27 RX ORDER — LISINOPRIL 5 MG/1
5 TABLET ORAL DAILY
Qty: 90 TABLET | Refills: 0 | Status: SHIPPED | OUTPATIENT
Start: 2018-12-27 | End: 2019-01-15

## 2018-12-27 RX ORDER — AMLODIPINE BESYLATE 10 MG/1
20 TABLET ORAL DAILY
Qty: 180 TABLET | Refills: 0 | Status: CANCELLED | OUTPATIENT
Start: 2018-12-27 | End: 2019-12-27

## 2018-12-27 NOTE — PROGRESS NOTES
Ayo Hernández is being followed for Blood Pressure management.    NURSING ASSESSMENT/PLAN:  Blood pressure reading today is not at the provider specified goal of <140/90.    Current blood pressure medication(s):  Norvasc 10 mg daily    1.  The patient will begin: lisinopril 5 mg daily and take along with his norvasc 10 mg daily.     2.  We will be checking a metabolic lab panel today.      3.  Follow up instructions include:     Next Nurse visit: 3 weeks.  Paxton 15, 2019 10:00am    SUBJECTIVE:                                                    The patient is taking medication as prescribed and is tolerating well.   Patient is not monitoring Blood Pressure at home.   Last 3 home readings NA  In addition notes: NA    Out of the following complicating factors: Cough, Headache, Lightheadedness, Shortness of breath, Fatigue, Nausea, Sexual Dysfunction, New onset of swelling or edema, Weakness and New onset of Chest Pain, the patient reports:  None    OBJECTIVE:                                                    Is pulse 55 or greater? - Yes  Pulse Readings from Last 1 Encounters:   12/11/18 (P) 69     Today's BP completed using cuff size: X-large on right side arm.  BP Readings from Last 3 Encounters:   12/11/18 147/71   10/11/18 (!) 166/99   06/13/18 (!) 173/93       Current Outpatient Medications   Medication Sig Dispense Refill     amLODIPine (NORVASC) 10 MG tablet Take 1 tablet (10 mg) by mouth daily This is double dosage for blood pressure 90 tablet 1     amLODIPine (NORVASC) 5 MG tablet Take 1 tablet (5 mg) by mouth daily Overdue for md appointment 30 tablet 0     HYDROcodone-acetaminophen (NORCO) 5-325 MG per tablet Take 1-2 tablets by mouth every 4 hours as needed for other (Moderate to Severe Pain) (Patient not taking: Reported on 12/11/2018) 40 tablet 0     ibuprofen (ADVIL,MOTRIN) 200 MG tablet Take 200 mg by mouth every 4 hours as needed.       sildenafil (REVATIO) 20 MG tablet 1-2 tabs daily as needed for ed   Never use with nitroglycerin, terazosin or doxazosin. 20 tablet 3     sildenafil (VIAGRA) 50 MG tablet Take 0.5-1 tablets (25-50 mg) by mouth daily as needed Take 30 min to 4 hours before intercourse To ER if chest pain, shortness of breath , prolonged erections (Patient not taking: Reported on 12/11/2018) 30 tablet 1     tadalafil (CIALIS) 10 MG tablet Take 0.5-1 tablets (5-10 mg) by mouth daily as needed for erectile dysfunction Never use with nitroglycerin, terazosin or doxazosin. 12 tablet 4     Potassium   Date Value Ref Range Status   10/20/2017 5.0 3.4 - 5.3 mmol/L Final     Creatinine   Date Value Ref Range Status   10/20/2017 0.85 0.66 - 1.25 mg/dL Final     Urea Nitrogen   Date Value Ref Range Status   10/20/2017 26 7 - 30 mg/dL Final     GFR Estimate   Date Value Ref Range Status   10/20/2017 >90 >60 mL/min/1.7m2 Final     Comment:     Non  GFR Calc      A baseline potassium, creatinine, BUN, GFR has been done within past 12 months    Education:  general discussion/verbal explanation  Limit dietary sodium intake between 1500-2000mg every day  Regular aerobic exercise.  Discussed habit change regarding patient activation   These interventions were used: Empathy/Understanding  Education material provided and patient was given an opportunity to ask questions.    Patient verbalized understanding of this plan and is agreeable.    Professional Reference click here   CAdd blood pressure goal to problem list:  <140/90    Patient is being referred to RN Hypertension Program for the following diagnoses:  Hypertension    RN will confirm Potassium, Creatinine, BUN (or BMP) have been done within the past 12 months.  RN will order follow-up labs according to RN protocol.      According to RN Protocol, start or titrate:     norvasc doubled. Add lisinopril 5mg and titrate to 20mg if needed.     If blood pressure not at goal after current medication titrated, the following medication(s) may be prescribed  and titrated.    sual dose 5 mg to 10 mg orally once daily.  Most common side effect:  fluid retention    If blood pressure not at goal after maximum dose reached, consult provideropy of current RN HTN MGMT order or refer to Other Orders:      SANJANA PartidaN RN

## 2018-12-27 NOTE — TELEPHONE ENCOUNTER
Patient seen for initial HTN RN appointment today and BP was above goal.   Medication needs to be adjusted but PCP's order not clear.  Patient taking norvasc 10 mg daily and HTN order says to double it:    Add blood pressure goal to problem list:  <140/90    Patient is being referred to RN Hypertension Program for the following diagnoses:  Hypertension    RN will confirm Potassium, Creatinine, BUN (or BMP) have been done within the past 12 months.  RN will order follow-up labs according to RN protocol.      According to RN Protocol, start or titrate:     norvasc doubled. Add lisinopril 5mg and titrate to 20mg if needed.     If blood pressure not at goal after current medication titrated, the following medication(s) may be prescribed and titrated.    sual dose 5 mg to 10 mg orally once daily.  Most common side effect:  fluid retention    If blood pressure not at goal after maximum dose reached, consult provider.

## 2019-01-15 ENCOUNTER — ALLIED HEALTH/NURSE VISIT (OUTPATIENT)
Dept: NURSING | Facility: CLINIC | Age: 44
End: 2019-01-15
Payer: COMMERCIAL

## 2019-01-15 VITALS — SYSTOLIC BLOOD PRESSURE: 122 MMHG | OXYGEN SATURATION: 98 % | HEART RATE: 67 BPM | DIASTOLIC BLOOD PRESSURE: 79 MMHG

## 2019-01-15 DIAGNOSIS — I10 BENIGN ESSENTIAL HYPERTENSION: ICD-10-CM

## 2019-01-15 DIAGNOSIS — R03.0 ELEVATED BLOOD PRESSURE READING WITHOUT DIAGNOSIS OF HYPERTENSION: ICD-10-CM

## 2019-01-15 PROCEDURE — 99207 ZZC NO CHARGE LOS: CPT

## 2019-01-15 RX ORDER — AMLODIPINE BESYLATE 10 MG/1
10 TABLET ORAL DAILY
Qty: 90 TABLET | Refills: 1 | Status: SHIPPED | OUTPATIENT
Start: 2019-01-15 | End: 2019-09-08

## 2019-01-15 RX ORDER — LISINOPRIL 5 MG/1
5 TABLET ORAL DAILY
Qty: 90 TABLET | Refills: 1 | Status: SHIPPED | OUTPATIENT
Start: 2019-01-15 | End: 2019-09-08

## 2019-01-15 NOTE — PROGRESS NOTES
Ayo Hernández is being followed for Blood Pressure management.    NURSING ASSESSMENT/PLAN:  Blood pressure reading today is at the provider specified goal of <140/90.    Current blood pressure medication(s):    Amlodipine 10 mg daily  Lisinopril 5 mg daily    Viral cold, sweats, fever last week. Feeling better now.     1.  The patient will continue current medication regimen unchanged. Both medications refilled x 6 months at his preferred pharmacy.    2.  We will not be checking a metabolic lab panel today.      3.  Follow up instructions include:     Next Provider visit: Follow up in 6 months. Patient prefers to make PCP appointment later.    SUBJECTIVE:                                                    The patient is taking medication as prescribed and is tolerating well.   Patient is not monitoring Blood Pressure at home.   Last 3 home readings NA  In addition notes: NA    Out of the following complicating factors: Cough, Headache, Lightheadedness, Shortness of breath, Fatigue, Nausea, Sexual Dysfunction, New onset of swelling or edema, Weakness and New onset of Chest Pain, the patient reports:  None    OBJECTIVE:                                                    Is pulse 55 or greater? - Yes  Pulse Readings from Last 1 Encounters:   12/27/18 65     Today's BP completed using cuff size: large on right side arm.  BP Readings from Last 3 Encounters:   12/27/18 (!) 161/103   12/11/18 147/71   10/11/18 (!) 166/99       Current Outpatient Medications   Medication Sig Dispense Refill     amLODIPine (NORVASC) 10 MG tablet Take 1 tablet (10 mg) by mouth daily This is double dosage for blood pressure 90 tablet 1     HYDROcodone-acetaminophen (NORCO) 5-325 MG per tablet Take 1-2 tablets by mouth every 4 hours as needed for other (Moderate to Severe Pain) (Patient not taking: Reported on 12/11/2018) 40 tablet 0     ibuprofen (ADVIL,MOTRIN) 200 MG tablet Take 200 mg by mouth every 4 hours as needed.       lisinopril  (PRINIVIL/ZESTRIL) 5 MG tablet Take 1 tablet (5 mg) by mouth daily 90 tablet 0     sildenafil (REVATIO) 20 MG tablet 1-2 tabs daily as needed for ed  Never use with nitroglycerin, terazosin or doxazosin. 20 tablet 3     sildenafil (VIAGRA) 50 MG tablet Take 0.5-1 tablets (25-50 mg) by mouth daily as needed Take 30 min to 4 hours before intercourse To ER if chest pain, shortness of breath , prolonged erections (Patient not taking: Reported on 12/11/2018) 30 tablet 1     tadalafil (CIALIS) 10 MG tablet Take 0.5-1 tablets (5-10 mg) by mouth daily as needed for erectile dysfunction Never use with nitroglycerin, terazosin or doxazosin. 12 tablet 4     Potassium   Date Value Ref Range Status   12/27/2018 4.7 3.4 - 5.3 mmol/L Final     Creatinine   Date Value Ref Range Status   12/27/2018 0.76 0.66 - 1.25 mg/dL Final     Urea Nitrogen   Date Value Ref Range Status   12/27/2018 21 7 - 30 mg/dL Final     GFR Estimate   Date Value Ref Range Status   12/27/2018 >90 >60 mL/min/[1.73_m2] Final     Comment:     Non  GFR Calc  Starting 12/18/2018, serum creatinine based estimated GFR (eGFR) will be   calculated using the Chronic Kidney Disease Epidemiology Collaboration   (CKD-EPI) equation.        A baseline potassium, creatinine, BUN, GFR has been done within past 12 months    Education:  general discussion/verbal explanation  Limit dietary sodium intake between 1500-2000mg every day  Regular aerobic exercise.  Discussed habit change regarding patient activation   These interventions were used: Reflection- simple or complex  Education material provided and patient was given an opportunity to ask questions.    Patient verbalized understanding of this plan and is agreeable.    Professional Reference click here   Copy of current RN HTN MGMT order or refer to Other Orders:  Add blood pressure goal to problem list:  <140/90    Patient is being referred to RN Hypertension Program for the following  diagnoses:  Hypertension    RN will confirm Potassium, Creatinine, BUN (or BMP) have been done within the past 12 months.  RN will order follow-up labs according to RN protocol.    According to RN Protocol, start or titrate:     norvasc doubled. Add lisinopril 5mg and titrate to 20mg if needed.     If blood pressure not at goal after current medication titrated, the following medication(s) may be prescribed and titrated.    Usual dose 5 mg to 10 mg orally once daily.  Most common side effect:  fluid retention    If blood pressure not at goal after maximum dose reached, consult provider.  Dosage adjustment made based on patient specific, physician directed, care plan.    SANJANA PartidaN RN

## 2019-01-29 ENCOUNTER — TELEPHONE (OUTPATIENT)
Dept: FAMILY MEDICINE | Facility: CLINIC | Age: 44
End: 2019-01-29

## 2019-01-29 NOTE — TELEPHONE ENCOUNTER
Panel Management Review      Patient has the following on his problem list:     Hypertension   Last three blood pressure readings:  BP Readings from Last 3 Encounters:   01/15/19 122/79   12/27/18 (!) 161/103   12/11/18 147/71     Blood pressure: FAILED    HTN Guidelines:  Age 18-59 BP range:  Less than 140/90  Age 60-85 with Diabetes:  Less than 140/90  Age 60-85 without Diabetes:  less than 150/90      Composite cancer screening  Chart review shows that this patient is due/due soon for the following None  Summary:    Patient is due/failing the following:   BP CHECK    Action needed:   He is seeing b/p nurse. In range the last visit      Type of outreach:    0    Questions for provider review:    None                                                                                                                                    Rosalee Gaspar CMA     Chart routed to 0   .

## 2019-01-31 ENCOUNTER — OFFICE VISIT (OUTPATIENT)
Dept: ORTHOPEDICS | Facility: CLINIC | Age: 44
End: 2019-01-31
Payer: COMMERCIAL

## 2019-01-31 VITALS
RESPIRATION RATE: 16 BRPM | DIASTOLIC BLOOD PRESSURE: 84 MMHG | WEIGHT: 215 LBS | HEART RATE: 70 BPM | HEIGHT: 73 IN | SYSTOLIC BLOOD PRESSURE: 144 MMHG | OXYGEN SATURATION: 96 % | BODY MASS INDEX: 28.49 KG/M2

## 2019-01-31 DIAGNOSIS — M17.0 PRIMARY OSTEOARTHRITIS OF BOTH KNEES: Primary | ICD-10-CM

## 2019-01-31 DIAGNOSIS — M25.561 BILATERAL CHRONIC KNEE PAIN: ICD-10-CM

## 2019-01-31 DIAGNOSIS — G89.29 BILATERAL CHRONIC KNEE PAIN: ICD-10-CM

## 2019-01-31 DIAGNOSIS — M25.562 BILATERAL CHRONIC KNEE PAIN: ICD-10-CM

## 2019-01-31 PROCEDURE — 20610 DRAIN/INJ JOINT/BURSA W/O US: CPT | Mod: 50 | Performed by: ORTHOPAEDIC SURGERY

## 2019-01-31 ASSESSMENT — PAIN SCALES - GENERAL: PAINLEVEL: SEVERE PAIN (6)

## 2019-01-31 ASSESSMENT — MIFFLIN-ST. JEOR: SCORE: 1914.11

## 2019-01-31 NOTE — LETTER
1/31/2019         RE: Ayo Hernández  [address]  [facility] [id number]        Dear Colleague,    Thank you for referring your patient, Ayo Hernández, to the [facility]. Please see a copy of my visit note below.    Chief Complaint   Patient presents with     RECHECK      f/u chronic bilateral knee osteoarthritis. His last injections were on 6/13/2018 with Kenalog. These injections worked great until last week. Would like injection today       HISTORY OF PRESENT ILLNESS:  Ayo Hernández is a 43 year old male seen for chronic bilateral knee pain, advanced primary osteoarthritis.  He returns today with moderate pain, rated a 5/10. Denies any swelling. He reports he can tell when its time. He will have stiffness with standing. Also has pain with getting up after prolonged sitting. His last injections were on 10/11/2018 with Kenalog. These injections worked great, with pain coming back about 1 weeks ago. He would like repeat injections today. Patient continues to be very active.      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      History reviewed. No pertinent past medical history.    Past Surgical History:   Procedure Laterality Date     ARTHROSCOPY KNEE Right 7/15/2016    Procedure: ARTHROSCOPY KNEE;  Surgeon: Osvaldo Mccoy MD;  Location:  OR     ORTHOPEDIC SURGERY  08.2012    left knee, arthroscopy     ORTHOPEDIC SURGERY  07.15.2016    right knee       Medications:   Current Outpatient Medications:      amLODIPine (NORVASC) 10 MG tablet, Take 1 tablet (10 mg) by mouth daily, Disp: 90 tablet, Rfl: 1     HYDROcodone-acetaminophen (NORCO) 5-325 MG per tablet, Take 1-2 tablets by mouth every 4 hours as needed for other (Moderate to Severe Pain), Disp: 40 tablet, Rfl: 0     ibuprofen (ADVIL,MOTRIN) 200 MG tablet, Take 200 mg by mouth every 4 hours as needed., Disp: , Rfl:       "lisinopril (PRINIVIL/ZESTRIL) 5 MG tablet, Take 1 tablet (5 mg) by mouth daily, Disp: 90 tablet, Rfl: 1     sildenafil (REVATIO) 20 MG tablet, 1-2 tabs daily as needed for ed  Never use with nitroglycerin, terazosin or doxazosin., Disp: 20 tablet, Rfl: 3     sildenafil (VIAGRA) 50 MG tablet, Take 0.5-1 tablets (25-50 mg) by mouth daily as needed Take 30 min to 4 hours before intercourse To ER if chest pain, shortness of breath , prolonged erections, Disp: 30 tablet, Rfl: 1     tadalafil (CIALIS) 10 MG tablet, Take 0.5-1 tablets (5-10 mg) by mouth daily as needed for erectile dysfunction Never use with nitroglycerin, terazosin or doxazosin., Disp: 12 tablet, Rfl: 4    Current Facility-Administered Medications:      bupivacaine (MARCAINE) 0.25 % injection 3 mL, 3 mL, , , Osvaldo Mcocy MD, 3 mL at 10/11/18 1554     bupivacaine (MARCAINE) 0.25 % injection 3 mL, 3 mL, , , Osvaldo Mccoy MD, 3 mL at 10/11/18 1554     lidocaine 1 % injection 4 mL, 4 mL, , , Osvaldo Mccoy MD, 4 mL at 10/11/18 1554     lidocaine 1 % injection 4 mL, 4 mL, , , Osvaldo Mccoy MD, 4 mL at 10/11/18 1554    Allergies: No Known Allergies       REVIEW OF SYSTEMS:   CONSTITUTIONAL:NEGATIVE for fever, chills, night sweats  INTEGUMENTARY/SKIN: NEGATIVE for worrisome wound problems or redness.  MUSCULOSKELETAL:See HPI above  NEURO: NEGATIVE for weakness, dizziness or paresthesias      PHYSICAL EXAM:  /84   Pulse 70   Resp 16   Ht 1.854 m (6' 1\")   Wt 97.5 kg (215 lb)   SpO2 96%   BMI 28.37 kg/m      GENERAL APPEARANCE: healthy, alert, no distress  SKIN: no suspicious lesions or rashes  NEURO: Normal strength and tone, mentation intact and speech normal  PSYCH:  mentation appears normal and affect normal, not anxious  RESPIRATORY: No increased work of breathing.    Gait: varus    No Quad atrophy, strength normal.  Intact sensation deep peroneal nerve, superficial peroneal nerve, med/lat tibial nerve, sural nerve, saphenous " nerve  Intact EHL, EDL, TA, FHL, GS, quadriceps hamstrings and hip flexors  Toes warm and well perfused, brisk capillary refill. Palpable 2+ dp pulses.  calf soft and nttp or squeeze.  Edema: none    right  KNEE EXAM:    Skin: intact, no ecchymosis.  ROM: 0 degrees extension to 120 degrees flexion  Effusion: none   Tender: medial joint line.   Positive crepitus.  Palpable marginal osteophytes    Left knee exam:  Skin intact. No erythema or ecchymosis.  ROM: 0 extension to 110 flexion  Effusion: none  Tender to palpation medial joint line, lateral joint line  Positive medial and patello-femoral crepitus.  Palpable marginal osteophytes.      X-RAY: no new images today.    3 views bilateral knees taken on 2/5/2018 were reviewed in clinic today.    Left: Advanced lateral compartment degenerative changes similar to the prior study, bone on bone with articular flattening, subchondral sclerosis. Moderate patellofemoral degenerative changes with large marginal osteophytes.     Right: Moderate patellofemoral degenerative changes, prominent marginal osteophyte. Marginal osteophyte formation in the lateral compartment without significant joint space loss.     Overall there is mild progression of degenerative changes since a comparison study.      Impression: Ayo Hernández is a 43 year old male with chronic bilateral knee pain, advanced primary osteoarthritis.      Plan:   * WB status: weightbearing per comfort.    * Rest  * Activity modification - avoid activities that aggravate symptoms.  * NSAIDS - regular use for inflammation, with food, as long as no contra-indications. Please discuss with pcp if needed.  * Ice twice daily to three times daily as needed.  * Compression wrap as needed.  * Elevation of extremity to reduce swelling as needed.  * Injections: risks and perceived benefits discussed today. Patient elects to proceed bilaterally.  * Tylenol as needed for pain  * return to clinic as needed  * Patient to follow up  with Primary Care provider regarding elevated blood pressure     PROCEDURE NOTE:  The risks, perceived benefits and potential complications (including but not limited to: bleeding, infection, pain, scar, damage to adjacent structures, atrophy or necrosis of soft tissue, skin blanching, failure to relieve symptoms, worsening of symptoms, allergic reaction) of injection were discussed with the patient. Questions were addressed and answered.The patient elected to proceed. Written informed consent was obtained. The correct procedural site was identified and confirmed. A RIGHT knee intraarticular injection was performed using 2mL Kenalog-40 40mg per mL and 7mL (4mL 1% lidocaine, 3mL 0.25% marcaine)  of local anesthetic after sterile prep, to the correct procedural site. Sterile bandaid applied. This was tolerated well by the patient. No apparent complications. Did also discuss that if diabetic, recommend close monitoring of blood sugars over the next week as cortisone injections can temporarily elevate blood sugars.     The risks, perceived benefits and potential complications (including but not limited to: bleeding, infection, pain, scar, damage to adjacent structures, atrophy or necrosis of soft tissue, skin blanching, failure to relieve symptoms, worsening of symptoms, allergic reaction) of injection were discussed with the patient. Questions were addressed and answered.The patient elected to proceed. Written informed consent was obtained. The correct procedural site was identified and confirmed. A LEFT knee intraarticular injection was performed using 2mL Kenalog-40 40mg per mL and 7mL (4mL 1% lidocaine, 3mL 0.25% marcaine)  of local anesthetic after sterile prep, to the correct procedural site. Sterile bandaid applied. This was tolerated well by the patient. No apparent complications. Did also discuss that if diabetic, recommend close monitoring of blood sugars over the next week as cortisone injections can  temporarily elevate blood sugars.        Osvaldo Mccoy M.D., M.S.  Dept. of Orthopaedic Surgery  [facility]    Procedures      Again, thank you for allowing me to participate in the care of your patient.        Sincerely,        Osvaldo Mccoy MD

## 2019-01-31 NOTE — PROGRESS NOTES
Chief Complaint   Patient presents with     RECHECK      f/u chronic bilateral knee osteoarthritis. His last injections were on 6/13/2018 with Kenalog. These injections worked great until last week. Would like injection today       HISTORY OF PRESENT ILLNESS:  Ayo Hernández is a 43 year old male seen for chronic bilateral knee pain, advanced primary osteoarthritis.  He returns today with moderate pain, rated a 5/10. Denies any swelling. He reports he can tell when its time. He will have stiffness with standing. Also has pain with getting up after prolonged sitting. His last injections were on 10/11/2018 with Kenalog. These injections worked great, with pain coming back about 1 weeks ago. He would like repeat injections today. Patient continues to be very active.      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      History reviewed. No pertinent past medical history.    Past Surgical History:   Procedure Laterality Date     ARTHROSCOPY KNEE Right 7/15/2016    Procedure: ARTHROSCOPY KNEE;  Surgeon: Osvaldo Mccoy MD;  Location:  OR     ORTHOPEDIC SURGERY  08.2012    left knee, arthroscopy     ORTHOPEDIC SURGERY  07.15.2016    right knee       Medications:   Current Outpatient Medications:      amLODIPine (NORVASC) 10 MG tablet, Take 1 tablet (10 mg) by mouth daily, Disp: 90 tablet, Rfl: 1     HYDROcodone-acetaminophen (NORCO) 5-325 MG per tablet, Take 1-2 tablets by mouth every 4 hours as needed for other (Moderate to Severe Pain), Disp: 40 tablet, Rfl: 0     ibuprofen (ADVIL,MOTRIN) 200 MG tablet, Take 200 mg by mouth every 4 hours as needed., Disp: , Rfl:      lisinopril (PRINIVIL/ZESTRIL) 5 MG tablet, Take 1 tablet (5 mg) by mouth daily, Disp: 90 tablet, Rfl: 1     sildenafil (REVATIO) 20 MG tablet, 1-2 tabs daily as needed for ed  Never use with nitroglycerin, terazosin or doxazosin., Disp: 20 tablet, Rfl: 3      "sildenafil (VIAGRA) 50 MG tablet, Take 0.5-1 tablets (25-50 mg) by mouth daily as needed Take 30 min to 4 hours before intercourse To ER if chest pain, shortness of breath , prolonged erections, Disp: 30 tablet, Rfl: 1     tadalafil (CIALIS) 10 MG tablet, Take 0.5-1 tablets (5-10 mg) by mouth daily as needed for erectile dysfunction Never use with nitroglycerin, terazosin or doxazosin., Disp: 12 tablet, Rfl: 4    Current Facility-Administered Medications:      bupivacaine (MARCAINE) 0.25 % injection 3 mL, 3 mL, , , Osvaldo Mccoy MD, 3 mL at 10/11/18 1554     bupivacaine (MARCAINE) 0.25 % injection 3 mL, 3 mL, , , Osvaldo Mccoy MD, 3 mL at 10/11/18 1554     lidocaine 1 % injection 4 mL, 4 mL, , , Osvaldo Mccoy MD, 4 mL at 10/11/18 1554     lidocaine 1 % injection 4 mL, 4 mL, , , Osvaldo Mccoy MD, 4 mL at 10/11/18 1554    Allergies: No Known Allergies       REVIEW OF SYSTEMS:   CONSTITUTIONAL:NEGATIVE for fever, chills, night sweats  INTEGUMENTARY/SKIN: NEGATIVE for worrisome wound problems or redness.  MUSCULOSKELETAL:See HPI above  NEURO: NEGATIVE for weakness, dizziness or paresthesias      PHYSICAL EXAM:  /84   Pulse 70   Resp 16   Ht 1.854 m (6' 1\")   Wt 97.5 kg (215 lb)   SpO2 96%   BMI 28.37 kg/m     GENERAL APPEARANCE: healthy, alert, no distress  SKIN: no suspicious lesions or rashes  NEURO: Normal strength and tone, mentation intact and speech normal  PSYCH:  mentation appears normal and affect normal, not anxious  RESPIRATORY: No increased work of breathing.    Gait: varus    No Quad atrophy, strength normal.  Intact sensation deep peroneal nerve, superficial peroneal nerve, med/lat tibial nerve, sural nerve, saphenous nerve  Intact EHL, EDL, TA, FHL, GS, quadriceps hamstrings and hip flexors  Toes warm and well perfused, brisk capillary refill. Palpable 2+ dp pulses.  calf soft and nttp or squeeze.  Edema: none    right  KNEE EXAM:    Skin: intact, no ecchymosis.  ROM: 0 " degrees extension to 120 degrees flexion  Effusion: none   Tender: medial joint line.   Positive crepitus.  Palpable marginal osteophytes    Left knee exam:  Skin intact. No erythema or ecchymosis.  ROM: 0 extension to 110 flexion  Effusion: none  Tender to palpation medial joint line, lateral joint line  Positive medial and patello-femoral crepitus.  Palpable marginal osteophytes.      X-RAY: no new images today.    3 views bilateral knees taken on 2/5/2018 were reviewed in clinic today.    Left: Advanced lateral compartment degenerative changes similar to the prior study, bone on bone with articular flattening, subchondral sclerosis. Moderate patellofemoral degenerative changes with large marginal osteophytes.     Right: Moderate patellofemoral degenerative changes, prominent marginal osteophyte. Marginal osteophyte formation in the lateral compartment without significant joint space loss.     Overall there is mild progression of degenerative changes since a comparison study.      Impression: Ayo Hernández is a 43 year old male with chronic bilateral knee pain, advanced primary osteoarthritis.      Plan:   * WB status: weightbearing per comfort.    * Rest  * Activity modification - avoid activities that aggravate symptoms.  * NSAIDS - regular use for inflammation, with food, as long as no contra-indications. Please discuss with pcp if needed.  * Ice twice daily to three times daily as needed.  * Compression wrap as needed.  * Elevation of extremity to reduce swelling as needed.  * Injections: risks and perceived benefits discussed today. Patient elects to proceed bilaterally.  * Tylenol as needed for pain  * return to clinic as needed  * Patient to follow up with Primary Care provider regarding elevated blood pressure     PROCEDURE NOTE:  The risks, perceived benefits and potential complications (including but not limited to: bleeding, infection, pain, scar, damage to adjacent structures, atrophy or necrosis of  soft tissue, skin blanching, failure to relieve symptoms, worsening of symptoms, allergic reaction) of injection were discussed with the patient. Questions were addressed and answered.The patient elected to proceed. Written informed consent was obtained. The correct procedural site was identified and confirmed. A RIGHT knee intraarticular injection was performed using 2mL Kenalog-40 40mg per mL and 7mL (4mL 1% lidocaine, 3mL 0.25% marcaine)  of local anesthetic after sterile prep, to the correct procedural site. Sterile bandaid applied. This was tolerated well by the patient. No apparent complications. Did also discuss that if diabetic, recommend close monitoring of blood sugars over the next week as cortisone injections can temporarily elevate blood sugars.     The risks, perceived benefits and potential complications (including but not limited to: bleeding, infection, pain, scar, damage to adjacent structures, atrophy or necrosis of soft tissue, skin blanching, failure to relieve symptoms, worsening of symptoms, allergic reaction) of injection were discussed with the patient. Questions were addressed and answered.The patient elected to proceed. Written informed consent was obtained. The correct procedural site was identified and confirmed. A LEFT knee intraarticular injection was performed using 2mL Kenalog-40 40mg per mL and 7mL (4mL 1% lidocaine, 3mL 0.25% marcaine)  of local anesthetic after sterile prep, to the correct procedural site. Sterile bandaid applied. This was tolerated well by the patient. No apparent complications. Did also discuss that if diabetic, recommend close monitoring of blood sugars over the next week as cortisone injections can temporarily elevate blood sugars.        Osvaldo Mccoy M.D., M.S.  Dept. of Orthopaedic Surgery  [facility]    Procedures

## 2019-05-15 ENCOUNTER — TELEPHONE (OUTPATIENT)
Dept: FAMILY MEDICINE | Facility: CLINIC | Age: 44
End: 2019-05-15

## 2019-05-15 NOTE — TELEPHONE ENCOUNTER
Panel Management Review      Patient has the following on his problem list:     Hypertension   Last three blood pressure readings:  BP Readings from Last 3 Encounters:   01/31/19 144/84   01/15/19 122/79   12/27/18 (!) 161/103     Blood pressure: FAILED    HTN Guidelines:  Less than 140/90      Composite cancer screening  Chart review shows that this patient is due/due soon for the following None  Summary:    Patient is due/failing the following:   BP CHECK    Action needed:   Patient needs office visit for b/p in August 2019.    Type of outreach:    0    Questions for provider review:    None                                                                                                                                    Rosalee Gaspar CMA     Chart routed to 0 .

## 2019-06-10 ENCOUNTER — OFFICE VISIT (OUTPATIENT)
Dept: ORTHOPEDICS | Facility: CLINIC | Age: 44
End: 2019-06-10
Payer: COMMERCIAL

## 2019-06-10 VITALS
SYSTOLIC BLOOD PRESSURE: 135 MMHG | RESPIRATION RATE: 16 BRPM | WEIGHT: 205 LBS | HEIGHT: 72 IN | DIASTOLIC BLOOD PRESSURE: 83 MMHG | BODY MASS INDEX: 27.77 KG/M2

## 2019-06-10 DIAGNOSIS — M17.0 PRIMARY OSTEOARTHRITIS OF BOTH KNEES: Primary | ICD-10-CM

## 2019-06-10 PROCEDURE — 20610 DRAIN/INJ JOINT/BURSA W/O US: CPT | Mod: 50 | Performed by: ORTHOPAEDIC SURGERY

## 2019-06-10 ASSESSMENT — MIFFLIN-ST. JEOR
SCORE: 1852.87
SCORE: 1852.87

## 2019-06-10 ASSESSMENT — PAIN SCALES - GENERAL
PAINLEVEL: MODERATE PAIN (5)
PAINLEVEL: MILD PAIN (2)

## 2019-06-10 NOTE — PROGRESS NOTES
Chief Complaint   Patient presents with     Knee Pain     azeb knee oa. last injections with Kenalog on 1/31/19.        HISTORY OF PRESENT ILLNESS:  Ayo Hernández is a 43 year old male seen for chronic bilateral knee pain, advanced primary osteoarthritis.  He returns today with moderate pain, rated a 5/10. Denies any swelling. He reports he can tell when its time. He will have stiffness with standing. Also has pain with getting up after prolonged sitting. His last injections were on 1/31/2019 with Kenalog. These injections worked great, with pain coming back about 1 weeks ago. He would like repeat injections today. Patient continues to be very active, doing more lower body exercises which seem to be helping his knee. He's also watching his diet, working on weight loss.   Pain 5/10.  Has his son's high school grad party this weekend, so busy with that.    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      No past medical history on file.    Past Surgical History:   Procedure Laterality Date     ARTHROSCOPY KNEE Right 7/15/2016    Procedure: ARTHROSCOPY KNEE;  Surgeon: Osvaldo Mccoy MD;  Location:  OR     ORTHOPEDIC SURGERY  08.2012    left knee, arthroscopy     ORTHOPEDIC SURGERY  07.15.2016    right knee       Medications:   Current Outpatient Medications:      amLODIPine (NORVASC) 10 MG tablet, Take 1 tablet (10 mg) by mouth daily, Disp: 90 tablet, Rfl: 1     HYDROcodone-acetaminophen (NORCO) 5-325 MG per tablet, Take 1-2 tablets by mouth every 4 hours as needed for other (Moderate to Severe Pain), Disp: 40 tablet, Rfl: 0     ibuprofen (ADVIL,MOTRIN) 200 MG tablet, Take 200 mg by mouth every 4 hours as needed., Disp: , Rfl:      lisinopril (PRINIVIL/ZESTRIL) 5 MG tablet, Take 1 tablet (5 mg) by mouth daily, Disp: 90 tablet, Rfl: 1     sildenafil (REVATIO) 20 MG tablet, 1-2 tabs daily as needed for ed  Never use with  nitroglycerin, terazosin or doxazosin., Disp: 20 tablet, Rfl: 3     sildenafil (VIAGRA) 50 MG tablet, Take 0.5-1 tablets (25-50 mg) by mouth daily as needed Take 30 min to 4 hours before intercourse To ER if chest pain, shortness of breath , prolonged erections, Disp: 30 tablet, Rfl: 1     tadalafil (CIALIS) 10 MG tablet, Take 0.5-1 tablets (5-10 mg) by mouth daily as needed for erectile dysfunction Never use with nitroglycerin, terazosin or doxazosin., Disp: 12 tablet, Rfl: 4    Current Facility-Administered Medications:      bupivacaine (MARCAINE) 0.25 % injection 3 mL, 3 mL, , , Osvaldo Mccoy MD, 3 mL at 10/11/18 1554     bupivacaine (MARCAINE) 0.25 % injection 3 mL, 3 mL, , , Osvaldo Mccoy MD, 3 mL at 10/11/18 1554     lidocaine 1 % injection 4 mL, 4 mL, , , Osvaldo Mccoy MD, 4 mL at 10/11/18 1554     lidocaine 1 % injection 4 mL, 4 mL, , , Osvaldo Mccoy MD, 4 mL at 10/11/18 1554    Allergies: No Known Allergies       REVIEW OF SYSTEMS:   CONSTITUTIONAL:NEGATIVE for fever, chills, night sweats  INTEGUMENTARY/SKIN: NEGATIVE for worrisome wound problems or redness.  MUSCULOSKELETAL:See HPI above  NEURO: NEGATIVE for weakness, dizziness or paresthesias      PHYSICAL EXAM:  Resp 16   Ht 1.829 m (6')   Wt 93 kg (205 lb)   BMI 27.80 kg/m     GENERAL APPEARANCE: healthy, alert, no distress  SKIN: no suspicious lesions or rashes  NEURO: Normal strength and tone, mentation intact and speech normal  PSYCH:  mentation appears normal and affect normal, not anxious  RESPIRATORY: No increased work of breathing.    Gait: normal  Alignment: varus    No Quad atrophy, strength normal.  Intact sensation deep peroneal nerve, superficial peroneal nerve, med/lat tibial nerve, sural nerve, saphenous nerve  Intact EHL, EDL, TA, FHL, GS, quadriceps hamstrings and hip flexors  Toes warm and well perfused, brisk capillary refill. Palpable 2+ dp pulses.  calf soft and nttp or squeeze.  Edema: none    right  KNEE  EXAM:    Skin: intact, no ecchymosis.  ROM: 0 degrees extension to 120 degrees flexion  Effusion: none   Tender: medial joint line.   Positive crepitus.  Palpable marginal osteophytes    Left knee exam:  Skin intact. No erythema or ecchymosis.  ROM: 0 extension to 110 flexion  Effusion: none  Tender to palpation medial joint line, lateral joint line  Positive medial and patello-femoral crepitus.  Palpable marginal osteophytes.      X-RAY: no new images today.    3 views bilateral knees taken on 2/5/2018 were reviewed in clinic today.    Left: Advanced lateral compartment degenerative changes similar to the prior study, bone on bone with articular flattening, subchondral sclerosis. Moderate patellofemoral degenerative changes with large marginal osteophytes.     Right: Moderate patellofemoral degenerative changes, prominent marginal osteophyte. Marginal osteophyte formation in the lateral compartment without significant joint space loss.     Overall there is mild progression of degenerative changes since a comparison study.      Impression: Ayo Hernández is a 43 year old male with chronic bilateral knee pain, advanced primary osteoarthritis.      Plan:   * WB status: weightbearing per comfort.    * Rest  * Activity modification - avoid activities that aggravate symptoms.  * NSAIDS - regular use for inflammation, with food, as long as no contra-indications. Please discuss with pcp if needed.  * Ice twice daily to three times daily as needed.  * Compression wrap as needed.  * Elevation of extremity to reduce swelling as needed.  * Injections: risks and perceived benefits discussed today. Patient elects to proceed bilaterally.  * Tylenol as needed for pain  * return to clinic as needed      PROCEDURE NOTE:  The risks, perceived benefits and potential complications (including but not limited to: bleeding, infection, pain, scar, damage to adjacent structures, atrophy or necrosis of soft tissue, skin blanching, failure to  relieve symptoms, worsening of symptoms, allergic reaction) of injection were discussed with the patient. Questions were addressed and answered.The patient elected to proceed. Written informed consent was obtained. The correct procedural site was identified and confirmed. A RIGHT knee intraarticular injection was performed using 2mL Kenalog-40 40mg per mL and 7mL (4mL 1% lidocaine, 3mL 0.25% marcaine)  of local anesthetic after sterile prep, to the correct procedural site. Sterile bandaid applied. This was tolerated well by the patient. No apparent complications. Did also discuss that if diabetic, recommend close monitoring of blood sugars over the next week as cortisone injections can temporarily elevate blood sugars.     The risks, perceived benefits and potential complications (including but not limited to: bleeding, infection, pain, scar, damage to adjacent structures, atrophy or necrosis of soft tissue, skin blanching, failure to relieve symptoms, worsening of symptoms, allergic reaction) of injection were discussed with the patient. Questions were addressed and answered.The patient elected to proceed. Written informed consent was obtained. The correct procedural site was identified and confirmed. A LEFT knee intraarticular injection was performed using 2mL Kenalog-40 40mg per mL and 7mL (4mL 1% lidocaine, 3mL 0.25% marcaine)  of local anesthetic after sterile prep, to the correct procedural site. Sterile bandaid applied. This was tolerated well by the patient. No apparent complications. Did also discuss that if diabetic, recommend close monitoring of blood sugars over the next week as cortisone injections can temporarily elevate blood sugars.        Osvaldo Mccoy M.D., M.S.  Dept. of Orthopaedic Surgery  [facility]    Procedures

## 2019-06-10 NOTE — LETTER
6/10/2019         RE: Ayo Hernández  [address] [facility], [place] [address]        Dear Colleague,    Thank you for referring your patient, Ayo Hernández, to the [facility] [place]. Please see a copy of my visit note below.    Chief Complaint   Patient presents with     Knee Pain     azeb knee oa. last injections with Kenalog on 1/31/19.        HISTORY OF PRESENT ILLNESS:  Ayo Hernández is a 43 year old male seen for chronic bilateral knee pain, advanced primary osteoarthritis.  He returns today with moderate pain, rated a 5/10. Denies any swelling. He reports he can tell when its time. He will have stiffness with standing. Also has pain with getting up after prolonged sitting. His last injections were on 1/31/2019 with Kenalog. These injections worked great, with pain coming back about 1 weeks ago. He would like repeat injections today. Patient continues to be very active, doing more lower body exercises which seem to be helping his knee. He's also watching his diet, working on weight loss.   Pain 5/10.  Has his son's high school grad party this weekend, so busy with that.    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      No past medical history on file.    Past Surgical History:   Procedure Laterality Date     ARTHROSCOPY KNEE Right 7/15/2016    Procedure: ARTHROSCOPY KNEE;  Surgeon: Osvaldo Mccoy MD;  Location:  OR     ORTHOPEDIC SURGERY  08.2012    left knee, arthroscopy     ORTHOPEDIC SURGERY  07.15.2016    right knee       Medications:   Current Outpatient Medications:      amLODIPine (NORVASC) 10 MG tablet, Take 1 tablet (10 mg) by mouth daily, Disp: 90 tablet, Rfl: 1     HYDROcodone-acetaminophen (NORCO) 5-325 MG per tablet, Take 1-2 tablets by mouth every 4 hours as needed for other (Moderate to Severe Pain), Disp: 40 tablet, Rfl: 0     ibuprofen (ADVIL,MOTRIN) 200 MG tablet,  Take 200 mg by mouth every 4 hours as needed., Disp: , Rfl:      lisinopril (PRINIVIL/ZESTRIL) 5 MG tablet, Take 1 tablet (5 mg) by mouth daily, Disp: 90 tablet, Rfl: 1     sildenafil (REVATIO) 20 MG tablet, 1-2 tabs daily as needed for ed  Never use with nitroglycerin, terazosin or doxazosin., Disp: 20 tablet, Rfl: 3     sildenafil (VIAGRA) 50 MG tablet, Take 0.5-1 tablets (25-50 mg) by mouth daily as needed Take 30 min to 4 hours before intercourse To ER if chest pain, shortness of breath , prolonged erections, Disp: 30 tablet, Rfl: 1     tadalafil (CIALIS) 10 MG tablet, Take 0.5-1 tablets (5-10 mg) by mouth daily as needed for erectile dysfunction Never use with nitroglycerin, terazosin or doxazosin., Disp: 12 tablet, Rfl: 4    Current Facility-Administered Medications:      bupivacaine (MARCAINE) 0.25 % injection 3 mL, 3 mL, , , Osvaldo Mccoy MD, 3 mL at 10/11/18 1554     bupivacaine (MARCAINE) 0.25 % injection 3 mL, 3 mL, , , Osvaldo Mccoy MD, 3 mL at 10/11/18 1554     lidocaine 1 % injection 4 mL, 4 mL, , , Osvaldo Mccoy MD, 4 mL at 10/11/18 1554     lidocaine 1 % injection 4 mL, 4 mL, , , Osvaldo Mccoy MD, 4 mL at 10/11/18 1554    Allergies: No Known Allergies       REVIEW OF SYSTEMS:   CONSTITUTIONAL:NEGATIVE for fever, chills, night sweats  INTEGUMENTARY/SKIN: NEGATIVE for worrisome wound problems or redness.  MUSCULOSKELETAL:See HPI above  NEURO: NEGATIVE for weakness, dizziness or paresthesias      PHYSICAL EXAM:  Resp 16   Ht 1.829 m (6')   Wt 93 kg (205 lb)   BMI 27.80 kg/m      GENERAL APPEARANCE: healthy, alert, no distress  SKIN: no suspicious lesions or rashes  NEURO: Normal strength and tone, mentation intact and speech normal  PSYCH:  mentation appears normal and affect normal, not anxious  RESPIRATORY: No increased work of breathing.    Gait: normal  Alignment: varus    No Quad atrophy, strength normal.  Intact sensation deep peroneal nerve, superficial peroneal nerve,  med/lat tibial nerve, sural nerve, saphenous nerve  Intact EHL, EDL, TA, FHL, GS, quadriceps hamstrings and hip flexors  Toes warm and well perfused, brisk capillary refill. Palpable 2+ dp pulses.  calf soft and nttp or squeeze.  Edema: none    right  KNEE EXAM:    Skin: intact, no ecchymosis.  ROM: 0 degrees extension to 120 degrees flexion  Effusion: none   Tender: medial joint line.   Positive crepitus.  Palpable marginal osteophytes    Left knee exam:  Skin intact. No erythema or ecchymosis.  ROM: 0 extension to 110 flexion  Effusion: none  Tender to palpation medial joint line, lateral joint line  Positive medial and patello-femoral crepitus.  Palpable marginal osteophytes.      X-RAY: no new images today.    3 views bilateral knees taken on 2/5/2018 were reviewed in clinic today.    Left: Advanced lateral compartment degenerative changes similar to the prior study, bone on bone with articular flattening, subchondral sclerosis. Moderate patellofemoral degenerative changes with large marginal osteophytes.     Right: Moderate patellofemoral degenerative changes, prominent marginal osteophyte. Marginal osteophyte formation in the lateral compartment without significant joint space loss.     Overall there is mild progression of degenerative changes since a comparison study.      Impression: Ayo Hernández is a 43 year old male with chronic bilateral knee pain, advanced primary osteoarthritis.      Plan:   * WB status: weightbearing per comfort.    * Rest  * Activity modification - avoid activities that aggravate symptoms.  * NSAIDS - regular use for inflammation, with food, as long as no contra-indications. Please discuss with pcp if needed.  * Ice twice daily to three times daily as needed.  * Compression wrap as needed.  * Elevation of extremity to reduce swelling as needed.  * Injections: risks and perceived benefits discussed today. Patient elects to proceed bilaterally.  * Tylenol as needed for pain  * return  to clinic as needed      PROCEDURE NOTE:  The risks, perceived benefits and potential complications (including but not limited to: bleeding, infection, pain, scar, damage to adjacent structures, atrophy or necrosis of soft tissue, skin blanching, failure to relieve symptoms, worsening of symptoms, allergic reaction) of injection were discussed with the patient. Questions were addressed and answered.The patient elected to proceed. Written informed consent was obtained. The correct procedural site was identified and confirmed. A RIGHT knee intraarticular injection was performed using 2mL Kenalog-40 40mg per mL and 7mL (4mL 1% lidocaine, 3mL 0.25% marcaine)  of local anesthetic after sterile prep, to the correct procedural site. Sterile bandaid applied. This was tolerated well by the patient. No apparent complications. Did also discuss that if diabetic, recommend close monitoring of blood sugars over the next week as cortisone injections can temporarily elevate blood sugars.     The risks, perceived benefits and potential complications (including but not limited to: bleeding, infection, pain, scar, damage to adjacent structures, atrophy or necrosis of soft tissue, skin blanching, failure to relieve symptoms, worsening of symptoms, allergic reaction) of injection were discussed with the patient. Questions were addressed and answered.The patient elected to proceed. Written informed consent was obtained. The correct procedural site was identified and confirmed. A LEFT knee intraarticular injection was performed using 2mL Kenalog-40 40mg per mL and 7mL (4mL 1% lidocaine, 3mL 0.25% marcaine)  of local anesthetic after sterile prep, to the correct procedural site. Sterile bandaid applied. This was tolerated well by the patient. No apparent complications. Did also discuss that if diabetic, recommend close monitoring of blood sugars over the next week as cortisone injections can temporarily elevate blood sugars.        Osvaldo STREETER  SHAHRAM Mccoy., M.S.  Dept. of Orthopaedic Surgery  [facility]    Procedures      Again, thank you for allowing me to participate in the care of your patient.        Sincerely,        Osvaldo Mccoy MD

## 2019-09-08 DIAGNOSIS — R03.0 ELEVATED BLOOD PRESSURE READING WITHOUT DIAGNOSIS OF HYPERTENSION: ICD-10-CM

## 2019-09-08 DIAGNOSIS — I10 BENIGN ESSENTIAL HYPERTENSION: ICD-10-CM

## 2019-09-11 RX ORDER — AMLODIPINE BESYLATE 10 MG/1
10 TABLET ORAL DAILY
Qty: 90 TABLET | Refills: 0 | Status: SHIPPED | OUTPATIENT
Start: 2019-09-11 | End: 2019-12-09

## 2019-09-11 RX ORDER — LISINOPRIL 5 MG/1
5 TABLET ORAL DAILY
Qty: 90 TABLET | Refills: 0 | Status: SHIPPED | OUTPATIENT
Start: 2019-09-11 | End: 2019-12-09

## 2019-09-11 NOTE — TELEPHONE ENCOUNTER
Medication is being filled for 1 time refill only due to:  Patient needs to be seen because needs annual exam in Dec.   Alisa Soto RN

## 2019-09-12 ENCOUNTER — OFFICE VISIT (OUTPATIENT)
Dept: ORTHOPEDICS | Facility: CLINIC | Age: 44
End: 2019-09-12
Payer: COMMERCIAL

## 2019-09-12 VITALS
DIASTOLIC BLOOD PRESSURE: 84 MMHG | BODY MASS INDEX: 27.17 KG/M2 | HEART RATE: 63 BPM | SYSTOLIC BLOOD PRESSURE: 141 MMHG | OXYGEN SATURATION: 96 % | RESPIRATION RATE: 12 BRPM | HEIGHT: 73 IN | WEIGHT: 205 LBS

## 2019-09-12 DIAGNOSIS — M17.0 PRIMARY OSTEOARTHRITIS OF BOTH KNEES: Primary | ICD-10-CM

## 2019-09-12 DIAGNOSIS — R03.0 ELEVATED BLOOD PRESSURE READING WITHOUT DIAGNOSIS OF HYPERTENSION: ICD-10-CM

## 2019-09-12 DIAGNOSIS — I10 BENIGN ESSENTIAL HYPERTENSION: ICD-10-CM

## 2019-09-12 PROCEDURE — 20610 DRAIN/INJ JOINT/BURSA W/O US: CPT | Mod: 50 | Performed by: ORTHOPAEDIC SURGERY

## 2019-09-12 RX ORDER — LIDOCAINE HYDROCHLORIDE 10 MG/ML
4 INJECTION, SOLUTION INFILTRATION; PERINEURAL
Status: DISCONTINUED | OUTPATIENT
Start: 2019-09-12 | End: 2020-03-23

## 2019-09-12 RX ORDER — AMLODIPINE BESYLATE 10 MG/1
10 TABLET ORAL DAILY
Qty: 90 TABLET | Refills: 0 | OUTPATIENT
Start: 2019-09-12

## 2019-09-12 RX ORDER — LISINOPRIL 5 MG/1
5 TABLET ORAL DAILY
Qty: 90 TABLET | Refills: 0 | OUTPATIENT
Start: 2019-09-12

## 2019-09-12 RX ORDER — BETAMETHASONE SODIUM PHOSPHATE AND BETAMETHASONE ACETATE 3; 3 MG/ML; MG/ML
12 INJECTION, SUSPENSION INTRA-ARTICULAR; INTRALESIONAL; INTRAMUSCULAR; SOFT TISSUE
Status: DISCONTINUED | OUTPATIENT
Start: 2019-09-12 | End: 2020-03-23

## 2019-09-12 RX ORDER — BUPIVACAINE HYDROCHLORIDE 2.5 MG/ML
3 INJECTION, SOLUTION INFILTRATION; PERINEURAL
Status: DISCONTINUED | OUTPATIENT
Start: 2019-09-12 | End: 2020-03-23

## 2019-09-12 RX ADMIN — BUPIVACAINE HYDROCHLORIDE 3 ML: 2.5 INJECTION, SOLUTION INFILTRATION; PERINEURAL at 18:18

## 2019-09-12 RX ADMIN — BETAMETHASONE SODIUM PHOSPHATE AND BETAMETHASONE ACETATE 12 MG: 3; 3 INJECTION, SUSPENSION INTRA-ARTICULAR; INTRALESIONAL; INTRAMUSCULAR; SOFT TISSUE at 18:18

## 2019-09-12 RX ADMIN — LIDOCAINE HYDROCHLORIDE 4 ML: 10 INJECTION, SOLUTION INFILTRATION; PERINEURAL at 18:18

## 2019-09-12 ASSESSMENT — MIFFLIN-ST. JEOR: SCORE: 1863.75

## 2019-09-12 NOTE — LETTER
9/12/2019         RE: Ayo Hernández  [address] [facility] [address]        Dear Colleague,    Thank you for referring your patient, Ayo Hernández, to the [facility] [place]. Please see a copy of my visit note below.            Large Joint Injection/Arthocentesis: bilateral knee  Date/Time: 9/12/2019 6:18 PM  Performed by: Osvaldo Mccoy MD  Authorized by: Osvaldo Mccoy MD     Indications:  Pain and osteoarthritis  Needle Size:  22 G  Guidance: landmark guided    Approach:  Anteromedial  Location:  Knee  Laterality:  Bilateral      Medications (Right):  3 mL bupivacaine 0.25 %; 4 mL lidocaine 1 %; 12 mg betamethasone acet & sod phos 6 (3-3) MG/ML  Medications (Left):  3 mL bupivacaine 0.25 %; 4 mL lidocaine 1 %; 12 mg betamethasone acet & sod phos 6 (3-3) MG/ML  Outcome:  Tolerated well, no immediate complications  Procedure discussed: discussed risks, benefits, and alternatives    Consent Given by:  Patient  Prep: patient was prepped and draped in usual sterile fashion            Chief Complaint   Patient presents with     RECHECK     injection in both knees, last injections were 6/10/19       HISTORY OF PRESENT ILLNESS:  Ayo Hernández is a 44 year old male seen for chronic bilateral knee pain, advanced primary osteoarthritis.  He returns today with pain, right more than left. Denies any swelling. He reports he can tell when its time for more injections. He will have stiffness with standing. Also has pain with getting up after prolonged sitting. His last injections were on 6/10/2019 with Kenalog. These injections worked great, with pain coming back about 1 weeks ago. He would like repeat injections today. Patient continues to be very active, doing more lower body exercises which seem to be helping his knee. He's also watching his diet, working on weight loss.      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      No past medical history on file.    Past Surgical History:   Procedure Laterality Date     ARTHROSCOPY KNEE Right 7/15/2016    Procedure: ARTHROSCOPY KNEE;  Surgeon: Osvaldo Mccoy MD;  Location:  OR     ORTHOPEDIC SURGERY  08.2012    left knee, arthroscopy     ORTHOPEDIC SURGERY  07.15.2016    right knee       Medications:   Current Outpatient Medications:      amLODIPine (NORVASC) 10 MG tablet, TAKE 1 TABLET (10 MG) BY MOUTH DAILY, Disp: 90 tablet, Rfl: 0     HYDROcodone-acetaminophen (NORCO) 5-325 MG per tablet, Take 1-2 tablets by mouth every 4 hours as needed for other (Moderate to Severe Pain), Disp: 40 tablet, Rfl: 0     ibuprofen (ADVIL,MOTRIN) 200 MG tablet, Take 200 mg by mouth every 4 hours as needed., Disp: , Rfl:      lisinopril (PRINIVIL/ZESTRIL) 5 MG tablet, TAKE 1 TABLET (5 MG) BY MOUTH DAILY, Disp: 90 tablet, Rfl: 0     sildenafil (REVATIO) 20 MG tablet, 1-2 tabs daily as needed for ed  Never use with nitroglycerin, terazosin or doxazosin., Disp: 20 tablet, Rfl: 3     sildenafil (VIAGRA) 50 MG tablet, Take 0.5-1 tablets (25-50 mg) by mouth daily as needed Take 30 min to 4 hours before intercourse To ER if chest pain, shortness of breath , prolonged erections, Disp: 30 tablet, Rfl: 1     tadalafil (CIALIS) 10 MG tablet, Take 0.5-1 tablets (5-10 mg) by mouth daily as needed for erectile dysfunction Never use with nitroglycerin, terazosin or doxazosin., Disp: 12 tablet, Rfl: 4    Current Facility-Administered Medications:      bupivacaine (MARCAINE) 0.25 % injection 3 mL, 3 mL, , , Osvaldo Mccoy MD, 3 mL at 10/11/18 1554     bupivacaine (MARCAINE) 0.25 % injection 3 mL, 3 mL, , , Osvaldo Mccoy MD, 3 mL at 10/11/18 1554     lidocaine 1 % injection 4 mL, 4 mL, , , Osvaldo Mccoy MD, 4 mL at 10/11/18 1554     lidocaine 1 % injection 4 mL, 4 mL, , , sOvaldo Mccoy MD, 4 mL at 10/11/18 5606    Allergies: No Known Allergies       REVIEW OF  "SYSTEMS:   CONSTITUTIONAL:NEGATIVE for fever, chills, night sweats  INTEGUMENTARY/SKIN: NEGATIVE for worrisome wound problems or redness.  MUSCULOSKELETAL:See HPI above  NEURO: NEGATIVE for weakness, dizziness or paresthesias      PHYSICAL EXAM:  BP (!) 141/84   Pulse 63   Resp 12   Ht 1.854 m (6' 1\")   Wt 93 kg (205 lb)   SpO2 96%   BMI 27.05 kg/m      GENERAL APPEARANCE: healthy, alert, no distress  SKIN: no suspicious lesions or rashes  NEURO: Normal strength and tone, mentation intact and speech normal  PSYCH:  mentation appears normal and affect normal, not anxious  RESPIRATORY: No increased work of breathing.    Gait: normal  Alignment: varus    No Quad atrophy, strength normal.  Intact sensation deep peroneal nerve, superficial peroneal nerve, med/lat tibial nerve, sural nerve, saphenous nerve  Intact EHL, EDL, TA, FHL, GS, quadriceps hamstrings and hip flexors  Toes warm and well perfused, brisk capillary refill. Palpable 2+ dp pulses.  calf soft and nttp or squeeze.  Edema: none    right  KNEE EXAM:    Skin: intact, no ecchymosis.  ROM: 0 degrees extension to 110 degrees flexion  Effusion: none   Tender: medial joint line.   Positive crepitus.  Palpable marginal osteophytes    Left knee exam:  Skin intact. No erythema or ecchymosis.  ROM: 0 extension to 100 flexion  Effusion: none  Tender to palpation medial joint line, lateral joint line  Positive medial and patello-femoral crepitus.  Palpable marginal osteophytes.      X-RAY: no new images today.    3 views bilateral knees taken on 2/5/2018 were reviewed in clinic today.    Left: Advanced lateral compartment degenerative changes similar to the prior study, bone on bone with articular flattening, subchondral sclerosis. Moderate patellofemoral degenerative changes with large marginal osteophytes.     Right: Moderate patellofemoral degenerative changes, prominent marginal osteophyte. Marginal osteophyte formation in the lateral compartment without " significant joint space loss.     Overall there is mild progression of degenerative changes since a comparison study.      Impression: Ayo Hernández is a 44 year old male with chronic bilateral knee pain, advanced primary osteoarthritis.      Plan:   * WB status: weightbearing per comfort.    * Rest  * Activity modification - avoid activities that aggravate symptoms.  * NSAIDS - regular use for inflammation, with food, as long as no contra-indications. Please discuss with pcp if needed.  * Ice twice daily to three times daily as needed.  * Compression wrap as needed.  * Elevation of extremity to reduce swelling as needed.  * Injections: risks and perceived benefits discussed today. Patient elects to proceed bilaterally. Will try betamethasone today.  * Tylenol as needed for pain  * return to clinic as needed    Ayo to follow up with Primary Care provider regarding elevated blood pressure.      Osvaldo Mccoy M.D., M.S.  Dept. of Orthopaedic Surgery  [facility]      Again, thank you for allowing me to participate in the care of your patient.        Sincerely,        Osvaldo Mccoy MD

## 2019-09-12 NOTE — PROGRESS NOTES
Large Joint Injection/Arthocentesis: bilateral knee  Date/Time: 9/12/2019 6:18 PM  Performed by: Osvaldo Mccoy MD  Authorized by: Osvaldo Mccoy MD     Indications:  Pain and osteoarthritis  Needle Size:  22 G  Guidance: landmark guided    Approach:  Anteromedial  Location:  Knee  Laterality:  Bilateral      Medications (Right):  3 mL bupivacaine 0.25 %; 4 mL lidocaine 1 %; 12 mg betamethasone acet & sod phos 6 (3-3) MG/ML  Medications (Left):  3 mL bupivacaine 0.25 %; 4 mL lidocaine 1 %; 12 mg betamethasone acet & sod phos 6 (3-3) MG/ML  Outcome:  Tolerated well, no immediate complications  Procedure discussed: discussed risks, benefits, and alternatives    Consent Given by:  Patient  Prep: patient was prepped and draped in usual sterile fashion

## 2019-09-12 NOTE — PROGRESS NOTES
Chief Complaint   Patient presents with     RECHECK     injection in both knees, last injections were 6/10/19       HISTORY OF PRESENT ILLNESS:  Ayo Hernández is a 44 year old male seen for chronic bilateral knee pain, advanced primary osteoarthritis.  He returns today with pain, right more than left. Denies any swelling. He reports he can tell when its time for more injections. He will have stiffness with standing. Also has pain with getting up after prolonged sitting. His last injections were on 6/10/2019 with Kenalog. These injections worked great, with pain coming back about 1 weeks ago. He would like repeat injections today. Patient continues to be very active, doing more lower body exercises which seem to be helping his knee. He's also watching his diet, working on weight loss.      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      No past medical history on file.    Past Surgical History:   Procedure Laterality Date     ARTHROSCOPY KNEE Right 7/15/2016    Procedure: ARTHROSCOPY KNEE;  Surgeon: Osvaldo Mccoy MD;  Location:  OR     ORTHOPEDIC SURGERY  08.2012    left knee, arthroscopy     ORTHOPEDIC SURGERY  07.15.2016    right knee       Medications:   Current Outpatient Medications:      amLODIPine (NORVASC) 10 MG tablet, TAKE 1 TABLET (10 MG) BY MOUTH DAILY, Disp: 90 tablet, Rfl: 0     HYDROcodone-acetaminophen (NORCO) 5-325 MG per tablet, Take 1-2 tablets by mouth every 4 hours as needed for other (Moderate to Severe Pain), Disp: 40 tablet, Rfl: 0     ibuprofen (ADVIL,MOTRIN) 200 MG tablet, Take 200 mg by mouth every 4 hours as needed., Disp: , Rfl:      lisinopril (PRINIVIL/ZESTRIL) 5 MG tablet, TAKE 1 TABLET (5 MG) BY MOUTH DAILY, Disp: 90 tablet, Rfl: 0     sildenafil (REVATIO) 20 MG tablet, 1-2 tabs daily as needed for ed  Never use with nitroglycerin, terazosin or doxazosin., Disp: 20 tablet, Rfl: 3      "sildenafil (VIAGRA) 50 MG tablet, Take 0.5-1 tablets (25-50 mg) by mouth daily as needed Take 30 min to 4 hours before intercourse To ER if chest pain, shortness of breath , prolonged erections, Disp: 30 tablet, Rfl: 1     tadalafil (CIALIS) 10 MG tablet, Take 0.5-1 tablets (5-10 mg) by mouth daily as needed for erectile dysfunction Never use with nitroglycerin, terazosin or doxazosin., Disp: 12 tablet, Rfl: 4    Current Facility-Administered Medications:      bupivacaine (MARCAINE) 0.25 % injection 3 mL, 3 mL, , , Osvaldo Mccoy MD, 3 mL at 10/11/18 1554     bupivacaine (MARCAINE) 0.25 % injection 3 mL, 3 mL, , , Osvaldo Mccoy MD, 3 mL at 10/11/18 1554     lidocaine 1 % injection 4 mL, 4 mL, , , Osvaldo Mccoy MD, 4 mL at 10/11/18 1554     lidocaine 1 % injection 4 mL, 4 mL, , , Osvaldo Mccoy MD, 4 mL at 10/11/18 1554    Allergies: No Known Allergies       REVIEW OF SYSTEMS:   CONSTITUTIONAL:NEGATIVE for fever, chills, night sweats  INTEGUMENTARY/SKIN: NEGATIVE for worrisome wound problems or redness.  MUSCULOSKELETAL:See HPI above  NEURO: NEGATIVE for weakness, dizziness or paresthesias      PHYSICAL EXAM:  BP (!) 141/84   Pulse 63   Resp 12   Ht 1.854 m (6' 1\")   Wt 93 kg (205 lb)   SpO2 96%   BMI 27.05 kg/m     GENERAL APPEARANCE: healthy, alert, no distress  SKIN: no suspicious lesions or rashes  NEURO: Normal strength and tone, mentation intact and speech normal  PSYCH:  mentation appears normal and affect normal, not anxious  RESPIRATORY: No increased work of breathing.    Gait: normal  Alignment: varus    No Quad atrophy, strength normal.  Intact sensation deep peroneal nerve, superficial peroneal nerve, med/lat tibial nerve, sural nerve, saphenous nerve  Intact EHL, EDL, TA, FHL, GS, quadriceps hamstrings and hip flexors  Toes warm and well perfused, brisk capillary refill. Palpable 2+ dp pulses.  calf soft and nttp or squeeze.  Edema: none    right  KNEE EXAM:    Skin: intact, no " ecchymosis.  ROM: 0 degrees extension to 110 degrees flexion  Effusion: none   Tender: medial joint line.   Positive crepitus.  Palpable marginal osteophytes    Left knee exam:  Skin intact. No erythema or ecchymosis.  ROM: 0 extension to 100 flexion  Effusion: none  Tender to palpation medial joint line, lateral joint line  Positive medial and patello-femoral crepitus.  Palpable marginal osteophytes.      X-RAY: no new images today.    3 views bilateral knees taken on 2/5/2018 were reviewed in clinic today.    Left: Advanced lateral compartment degenerative changes similar to the prior study, bone on bone with articular flattening, subchondral sclerosis. Moderate patellofemoral degenerative changes with large marginal osteophytes.     Right: Moderate patellofemoral degenerative changes, prominent marginal osteophyte. Marginal osteophyte formation in the lateral compartment without significant joint space loss.     Overall there is mild progression of degenerative changes since a comparison study.      Impression: Ayo Hernández is a 44 year old male with chronic bilateral knee pain, advanced primary osteoarthritis.      Plan:   * WB status: weightbearing per comfort.    * Rest  * Activity modification - avoid activities that aggravate symptoms.  * NSAIDS - regular use for inflammation, with food, as long as no contra-indications. Please discuss with pcp if needed.  * Ice twice daily to three times daily as needed.  * Compression wrap as needed.  * Elevation of extremity to reduce swelling as needed.  * Injections: risks and perceived benefits discussed today. Patient elects to proceed bilaterally. Will try betamethasone today.  * Tylenol as needed for pain  * return to clinic as needed    Ayo to follow up with Primary Care provider regarding elevated blood pressure.      Osvaldo Mccoy M.D., M.S.  Dept. of Orthopaedic Surgery  [facility]

## 2019-12-02 ENCOUNTER — OFFICE VISIT (OUTPATIENT)
Dept: ORTHOPEDICS | Facility: CLINIC | Age: 44
End: 2019-12-02
Payer: COMMERCIAL

## 2019-12-02 VITALS
DIASTOLIC BLOOD PRESSURE: 83 MMHG | WEIGHT: 210 LBS | HEART RATE: 65 BPM | SYSTOLIC BLOOD PRESSURE: 154 MMHG | HEIGHT: 72 IN | OXYGEN SATURATION: 97 % | BODY MASS INDEX: 28.44 KG/M2

## 2019-12-02 DIAGNOSIS — M17.0 PRIMARY OSTEOARTHRITIS OF BOTH KNEES: Primary | ICD-10-CM

## 2019-12-02 PROCEDURE — 20610 DRAIN/INJ JOINT/BURSA W/O US: CPT | Mod: 50 | Performed by: ORTHOPAEDIC SURGERY

## 2019-12-02 RX ORDER — TRIAMCINOLONE ACETONIDE 40 MG/ML
80 INJECTION, SUSPENSION INTRA-ARTICULAR; INTRAMUSCULAR
Status: DISCONTINUED | OUTPATIENT
Start: 2019-12-02 | End: 2020-03-23

## 2019-12-02 RX ORDER — LIDOCAINE HYDROCHLORIDE 10 MG/ML
4 INJECTION, SOLUTION INFILTRATION; PERINEURAL
Status: DISCONTINUED | OUTPATIENT
Start: 2019-12-02 | End: 2020-03-23

## 2019-12-02 RX ORDER — BUPIVACAINE HYDROCHLORIDE 2.5 MG/ML
3 INJECTION, SOLUTION INFILTRATION; PERINEURAL
Status: DISCONTINUED | OUTPATIENT
Start: 2019-12-02 | End: 2020-03-23

## 2019-12-02 RX ADMIN — BUPIVACAINE HYDROCHLORIDE 3 ML: 2.5 INJECTION, SOLUTION INFILTRATION; PERINEURAL at 15:51

## 2019-12-02 RX ADMIN — TRIAMCINOLONE ACETONIDE 80 MG: 40 INJECTION, SUSPENSION INTRA-ARTICULAR; INTRAMUSCULAR at 15:51

## 2019-12-02 RX ADMIN — LIDOCAINE HYDROCHLORIDE 4 ML: 10 INJECTION, SOLUTION INFILTRATION; PERINEURAL at 15:51

## 2019-12-02 ASSESSMENT — MIFFLIN-ST. JEOR: SCORE: 1870.55

## 2019-12-02 NOTE — PROGRESS NOTES
"Chief Complaint   Patient presents with     Right Knee - Pain     Left Knee - Pain       HISTORY OF PRESENT ILLNESS:  Aoy Hernández is a 44 year old male seen for chronic bilateral knee pain, advanced primary osteoarthritis.  He returns today with pain, right more than left. Denies any swelling. He reports he can tell when its time for more injections. He will have stiffness with standing. Also has pain with getting up after prolonged sitting. His last injections were on 9/12/2019 with betamethasone. He stats these injections didn't help as well as previous, stating took longer to \"kick in\" and didn't last as long. He would like repeat injections today using Kenalog. Patient continues to be very active, doing more lower body exercises which seem to be helping his knee. He's also watching his diet, working on weight loss.      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      No past medical history on file.    Past Surgical History:   Procedure Laterality Date     ARTHROSCOPY KNEE Right 7/15/2016    Procedure: ARTHROSCOPY KNEE;  Surgeon: Osvaldo Mccoy MD;  Location:  OR     ORTHOPEDIC SURGERY  08.2012    left knee, arthroscopy     ORTHOPEDIC SURGERY  07.15.2016    right knee       Medications:   Current Outpatient Medications:      amLODIPine (NORVASC) 10 MG tablet, TAKE 1 TABLET (10 MG) BY MOUTH DAILY, Disp: 90 tablet, Rfl: 0     HYDROcodone-acetaminophen (NORCO) 5-325 MG per tablet, Take 1-2 tablets by mouth every 4 hours as needed for other (Moderate to Severe Pain), Disp: 40 tablet, Rfl: 0     ibuprofen (ADVIL,MOTRIN) 200 MG tablet, Take 200 mg by mouth every 4 hours as needed., Disp: , Rfl:      lisinopril (PRINIVIL/ZESTRIL) 5 MG tablet, TAKE 1 TABLET (5 MG) BY MOUTH DAILY, Disp: 90 tablet, Rfl: 0     sildenafil (REVATIO) 20 MG tablet, 1-2 tabs daily as needed for ed  Never use with nitroglycerin, terazosin or " doxazosin., Disp: 20 tablet, Rfl: 3     sildenafil (VIAGRA) 50 MG tablet, Take 0.5-1 tablets (25-50 mg) by mouth daily as needed Take 30 min to 4 hours before intercourse To ER if chest pain, shortness of breath , prolonged erections, Disp: 30 tablet, Rfl: 1     tadalafil (CIALIS) 10 MG tablet, Take 0.5-1 tablets (5-10 mg) by mouth daily as needed for erectile dysfunction Never use with nitroglycerin, terazosin or doxazosin., Disp: 12 tablet, Rfl: 4    Current Facility-Administered Medications:      betamethasone acet & sod phos (CELESTONE) injection 12 mg, 12 mg, , , Osvaldo Mccoy MD, 12 mg at 09/12/19 1818     betamethasone acet & sod phos (CELESTONE) injection 12 mg, 12 mg, , , Osvaldo Mccoy MD, 12 mg at 09/12/19 1818     bupivacaine (MARCAINE) 0.25 % injection 3 mL, 3 mL, , , Osvaldo Mccoy MD, 3 mL at 09/12/19 1818     bupivacaine (MARCAINE) 0.25 % injection 3 mL, 3 mL, , , Osvaldo Mccoy MD, 3 mL at 09/12/19 1818     bupivacaine (MARCAINE) 0.25 % injection 3 mL, 3 mL, , , Osvaldo Mccoy MD, 3 mL at 10/11/18 1554     bupivacaine (MARCAINE) 0.25 % injection 3 mL, 3 mL, , , Osvaldo Mccoy MD, 3 mL at 10/11/18 1554     lidocaine 1 % injection 4 mL, 4 mL, , , Osvaldo Mccoy MD, 4 mL at 09/12/19 1818     lidocaine 1 % injection 4 mL, 4 mL, , , Osvaldo Mccoy MD, 4 mL at 09/12/19 1818     lidocaine 1 % injection 4 mL, 4 mL, , , Osvaldo Mccoy MD, 4 mL at 10/11/18 1554     lidocaine 1 % injection 4 mL, 4 mL, , , Osvaldo Mccoy MD, 4 mL at 10/11/18 1554    Allergies: No Known Allergies       REVIEW OF SYSTEMS:   CONSTITUTIONAL:NEGATIVE for fever, chills, night sweats  INTEGUMENTARY/SKIN: NEGATIVE for worrisome wound problems or redness.  MUSCULOSKELETAL:See HPI above  NEURO: NEGATIVE for weakness, dizziness or paresthesias      PHYSICAL EXAM:  BP (!) 154/83   Pulse 65   Ht 1.829 m (6')   Wt 95.3 kg (210 lb)   SpO2 97%   BMI 28.48 kg/m     GENERAL APPEARANCE: healthy,  alert, no distress  SKIN: no suspicious lesions or rashes  NEURO: Normal strength and tone, mentation intact and speech normal  PSYCH:  mentation appears normal and affect normal, not anxious  RESPIRATORY: No increased work of breathing.    Gait: normal  Alignment: varus    No Quad atrophy, strength normal.  Intact sensation deep peroneal nerve, superficial peroneal nerve, med/lat tibial nerve, sural nerve, saphenous nerve  Intact EHL, EDL, TA, FHL, GS, quadriceps hamstrings and hip flexors  Toes warm and well perfused, brisk capillary refill. Palpable 2+ dp pulses.  calf soft and nttp or squeeze.  Edema: none    right  KNEE EXAM:    Skin: intact, no ecchymosis.  ROM: 0 degrees extension to 110 degrees flexion  Effusion:trace  Tender: medial joint line.   Positive crepitus.  Palpable marginal osteophytes    Left knee exam:  Skin intact. No erythema or ecchymosis.  ROM: 0 extension to 100 flexion  Effusion: trace  Tender to palpation medial joint line, lateral joint line  Positive medial and patello-femoral crepitus.  Palpable marginal osteophytes.      X-RAY: no new images today.    3 views bilateral knees taken on 2/5/2018 were reviewed in clinic today.    Left: Advanced lateral compartment degenerative changes similar to the prior study, bone on bone with articular flattening, subchondral sclerosis. Moderate patellofemoral degenerative changes with large marginal osteophytes.     Right: Moderate patellofemoral degenerative changes, prominent marginal osteophyte. Marginal osteophyte formation in the lateral compartment without significant joint space loss.     Overall there is mild progression of degenerative changes since a comparison study.      Impression: Ayo Hernández is a 44 year old male with chronic bilateral knee pain, advanced primary osteoarthritis.      Plan:   * WB status: weightbearing per comfort.    * Rest  * Activity modification - avoid activities that aggravate symptoms.  * NSAIDS - regular use  for inflammation, with food, as long as no contra-indications. Please discuss with pcp if needed.  * Ice twice daily to three times daily as needed.  * Compression wrap as needed.  * Elevation of extremity to reduce swelling as needed.  * Injections: risks and perceived benefits discussed today. Patient elects to proceed bilaterally. [facility]  * Tylenol as needed for pain  * return to clinic as needed    Ayo to follow up with Primary Care provider regarding elevated blood pressure.      Osvaldo Mccoy M.D., M.S.  Dept. of Orthopaedic Surgery  [facility]    Large Joint Injection/Arthocentesis: bilateral knee  Date/Time: 12/2/2019 3:51 PM  Performed by: Van Napoles PA  Authorized by: Osvaldo Mccoy MD     Indications:  Pain and osteoarthritis  Needle Size:  22 G  Guidance: landmark guided    Approach:  Anteromedial  Location:  Knee  Laterality:  Bilateral      Medications (Right):  3 mL bupivacaine 0.25 %; 4 mL lidocaine 1 %; 80 mg triamcinolone 40 MG/ML  Medications (Left):  3 mL bupivacaine 0.25 %; 4 mL lidocaine 1 %; 80 mg triamcinolone 40 MG/ML  Outcome:  Tolerated well, no immediate complications  Procedure discussed: discussed risks, benefits, and alternatives    Consent Given by:  Patient  Prep: patient was prepped and draped in usual sterile fashion

## 2019-12-02 NOTE — LETTER
"    12/2/2019         RE: Ayo Hernández  [address] [facility] [address]        Dear Colleague,    Thank you for referring your patient, Ayo Hernández, to the [facility]. Please see a copy of my visit note below.    Chief Complaint   Patient presents with     Right Knee - Pain     Left Knee - Pain       HISTORY OF PRESENT ILLNESS:  Ayo Hernández is a 44 year old male seen for chronic bilateral knee pain, advanced primary osteoarthritis.  He returns today with pain, right more than left. Denies any swelling. He reports he can tell when its time for more injections. He will have stiffness with standing. Also has pain with getting up after prolonged sitting. His last injections were on 9/12/2019 with betamethasone. He stats these injections didn't help as well as previous, stating took longer to \"kick in\" and didn't last as long. He would like repeat injections today using Kenalog. Patient continues to be very active, doing more lower body exercises which seem to be helping his knee. He's also watching his diet, working on weight loss.      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      No past medical history on file.    Past Surgical History:   Procedure Laterality Date     ARTHROSCOPY KNEE Right 7/15/2016    Procedure: ARTHROSCOPY KNEE;  Surgeon: Osvaldo Mccoy MD;  Location:  OR     ORTHOPEDIC SURGERY  08.2012    left knee, arthroscopy     ORTHOPEDIC SURGERY  07.15.2016    right knee       Medications:   Current Outpatient Medications:      amLODIPine (NORVASC) 10 MG tablet, TAKE 1 TABLET (10 MG) BY MOUTH DAILY, Disp: 90 tablet, Rfl: 0     HYDROcodone-acetaminophen (NORCO) 5-325 MG per tablet, Take 1-2 tablets by mouth every 4 hours as needed for other (Moderate to Severe Pain), Disp: 40 tablet, Rfl: 0     ibuprofen (ADVIL,MOTRIN) 200 MG tablet, Take 200 mg by mouth every 4 " hours as needed., Disp: , Rfl:      lisinopril (PRINIVIL/ZESTRIL) 5 MG tablet, TAKE 1 TABLET (5 MG) BY MOUTH DAILY, Disp: 90 tablet, Rfl: 0     sildenafil (REVATIO) 20 MG tablet, 1-2 tabs daily as needed for ed  Never use with nitroglycerin, terazosin or doxazosin., Disp: 20 tablet, Rfl: 3     sildenafil (VIAGRA) 50 MG tablet, Take 0.5-1 tablets (25-50 mg) by mouth daily as needed Take 30 min to 4 hours before intercourse To ER if chest pain, shortness of breath , prolonged erections, Disp: 30 tablet, Rfl: 1     tadalafil (CIALIS) 10 MG tablet, Take 0.5-1 tablets (5-10 mg) by mouth daily as needed for erectile dysfunction Never use with nitroglycerin, terazosin or doxazosin., Disp: 12 tablet, Rfl: 4    Current Facility-Administered Medications:      betamethasone acet & sod phos (CELESTONE) injection 12 mg, 12 mg, , , Osvaldo Mccoy MD, 12 mg at 09/12/19 1818     betamethasone acet & sod phos (CELESTONE) injection 12 mg, 12 mg, , , Osvaldo Mccoy MD, 12 mg at 09/12/19 1818     bupivacaine (MARCAINE) 0.25 % injection 3 mL, 3 mL, , , Osvaldo Mccoy MD, 3 mL at 09/12/19 1818     bupivacaine (MARCAINE) 0.25 % injection 3 mL, 3 mL, , , Osvaldo Mccoy MD, 3 mL at 09/12/19 1818     bupivacaine (MARCAINE) 0.25 % injection 3 mL, 3 mL, , , Osvaldo Mccoy MD, 3 mL at 10/11/18 1554     bupivacaine (MARCAINE) 0.25 % injection 3 mL, 3 mL, , , Osvaldo Mccoy MD, 3 mL at 10/11/18 1554     lidocaine 1 % injection 4 mL, 4 mL, , , Osvaldo Mccoy MD, 4 mL at 09/12/19 1818     lidocaine 1 % injection 4 mL, 4 mL, , , Osvaldo Mccoy MD, 4 mL at 09/12/19 1818     lidocaine 1 % injection 4 mL, 4 mL, , , Osvaldo Mccoy MD, 4 mL at 10/11/18 1554     lidocaine 1 % injection 4 mL, 4 mL, , , Osvaldo Mccoy MD, 4 mL at 10/11/18 1554    Allergies: No Known Allergies       REVIEW OF SYSTEMS:   CONSTITUTIONAL:NEGATIVE for fever, chills, night sweats  INTEGUMENTARY/SKIN: NEGATIVE for worrisome wound problems or  redness.  MUSCULOSKELETAL:See HPI above  NEURO: NEGATIVE for weakness, dizziness or paresthesias      PHYSICAL EXAM:  BP (!) 154/83   Pulse 65   Ht 1.829 m (6')   Wt 95.3 kg (210 lb)   SpO2 97%   BMI 28.48 kg/m      GENERAL APPEARANCE: healthy, alert, no distress  SKIN: no suspicious lesions or rashes  NEURO: Normal strength and tone, mentation intact and speech normal  PSYCH:  mentation appears normal and affect normal, not anxious  RESPIRATORY: No increased work of breathing.    Gait: normal  Alignment: varus    No Quad atrophy, strength normal.  Intact sensation deep peroneal nerve, superficial peroneal nerve, med/lat tibial nerve, sural nerve, saphenous nerve  Intact EHL, EDL, TA, FHL, GS, quadriceps hamstrings and hip flexors  Toes warm and well perfused, brisk capillary refill. Palpable 2+ dp pulses.  calf soft and nttp or squeeze.  Edema: none    right  KNEE EXAM:    Skin: intact, no ecchymosis.  ROM: 0 degrees extension to 110 degrees flexion  Effusion:trace  Tender: medial joint line.   Positive crepitus.  Palpable marginal osteophytes    Left knee exam:  Skin intact. No erythema or ecchymosis.  ROM: 0 extension to 100 flexion  Effusion: trace  Tender to palpation medial joint line, lateral joint line  Positive medial and patello-femoral crepitus.  Palpable marginal osteophytes.      X-RAY: no new images today.    3 views bilateral knees taken on 2/5/2018 were reviewed in clinic today.    Left: Advanced lateral compartment degenerative changes similar to the prior study, bone on bone with articular flattening, subchondral sclerosis. Moderate patellofemoral degenerative changes with large marginal osteophytes.     Right: Moderate patellofemoral degenerative changes, prominent marginal osteophyte. Marginal osteophyte formation in the lateral compartment without significant joint space loss.     Overall there is mild progression of degenerative changes since a comparison study.      Impression: Ayo JOSEPH  Betty is a 44 year old male with chronic bilateral knee pain, advanced primary osteoarthritis.      Plan:   * WB status: weightbearing per comfort.    * Rest  * Activity modification - avoid activities that aggravate symptoms.  * NSAIDS - regular use for inflammation, with food, as long as no contra-indications. Please discuss with pcp if needed.  * Ice twice daily to three times daily as needed.  * Compression wrap as needed.  * Elevation of extremity to reduce swelling as needed.  * Injections: risks and perceived benefits discussed today. Patient elects to proceed bilaterally. Will go back to kenalog today.  * Tylenol as needed for pain  * return to clinic as needed    Ayo to follow up with Primary Care provider regarding elevated blood pressure.      Osvaldo Mccoy M.D., M.S.  Dept. of Orthopaedic Surgery  [facility]    Large Joint Injection/Arthocentesis: bilateral knee  Date/Time: 12/2/2019 3:51 PM  Performed by: Van Napoles PA  Authorized by: Osvaldo Mccoy MD     Indications:  Pain and osteoarthritis  Needle Size:  22 G  Guidance: landmark guided    Approach:  Anteromedial  Location:  Knee  Laterality:  Bilateral      Medications (Right):  3 mL bupivacaine 0.25 %; 4 mL lidocaine 1 %; 80 mg triamcinolone 40 MG/ML  Medications (Left):  3 mL bupivacaine 0.25 %; 4 mL lidocaine 1 %; 80 mg triamcinolone 40 MG/ML  Outcome:  Tolerated well, no immediate complications  Procedure discussed: discussed risks, benefits, and alternatives    Consent Given by:  Patient  Prep: patient was prepped and draped in usual sterile fashion            Again, thank you for allowing me to participate in the care of your patient.        Sincerely,        Osvaldo Mccoy MD

## 2019-12-09 DIAGNOSIS — R03.0 ELEVATED BLOOD PRESSURE READING WITHOUT DIAGNOSIS OF HYPERTENSION: ICD-10-CM

## 2019-12-09 DIAGNOSIS — I10 BENIGN ESSENTIAL HYPERTENSION: ICD-10-CM

## 2019-12-09 RX ORDER — AMLODIPINE BESYLATE 10 MG/1
10 TABLET ORAL DAILY
Qty: 90 TABLET | Refills: 0 | Status: SHIPPED | OUTPATIENT
Start: 2019-12-09 | End: 2020-02-05

## 2019-12-09 RX ORDER — LISINOPRIL 5 MG/1
5 TABLET ORAL DAILY
Qty: 30 TABLET | Refills: 0 | Status: SHIPPED | OUTPATIENT
Start: 2019-12-09 | End: 2020-01-02

## 2019-12-10 NOTE — TELEPHONE ENCOUNTER
"To Dr. Juan Pablo Kimbrough to advise on 30 day refill request  Per 1/15/19 Ancillary appointment for bp check he was to have followed up in 6 months with pcp.  He has pending appointment end of this month with Dr. Santo.    Requested Prescriptions   Pending Prescriptions Disp Refills     amLODIPine (NORVASC) 10 MG tablet 90 tablet 0     Sig: Take 1 tablet (10 mg) by mouth daily       Calcium Channel Blockers Protocol  Failed - 12/9/2019  2:58 PM        Failed - Blood pressure under 140/90 in past 12 months     BP Readings from Last 3 Encounters:   12/02/19 (!) 154/83   09/12/19 (!) 141/84   06/10/19 135/83                 Passed - Recent (12 mo) or future (30 days) visit within the authorizing provider's specialty     Patient has had an office visit with the authorizing provider or a provider within the authorizing providers department within the previous 12 mos or has a future within next 30 days. See \"Patient Info\" tab in inbasket, or \"Choose Columns\" in Meds & Orders section of the refill encounter.              Passed - Medication is active on med list        Passed - Patient is age 18 or older        Passed - Normal serum creatinine on file in past 12 months     Recent Labs   Lab Test 12/27/18  0840   CR 0.76             lisinopril (PRINIVIL/ZESTRIL) 5 MG tablet 90 tablet 0     Sig: Take 1 tablet (5 mg) by mouth daily       ACE Inhibitors (Including Combos) Protocol Failed - 12/9/2019  2:58 PM        Failed - Blood pressure under 140/90 in past 12 months     BP Readings from Last 3 Encounters:   12/02/19 (!) 154/83   09/12/19 (!) 141/84   06/10/19 135/83                 Passed - Recent (12 mo) or future (30 days) visit within the authorizing provider's specialty     Patient has had an office visit with the authorizing provider or a provider within the authorizing providers department within the previous 12 mos or has a future within next 30 days. See \"Patient Info\" tab in inbasket, or \"Choose Columns\" in Meds & " Orders section of the refill encounter.              Passed - Medication is active on med list        Passed - Patient is age 18 or older        Passed - Normal serum creatinine on file in past 12 months     Recent Labs   Lab Test 12/27/18  0840   CR 0.76             Passed - Normal serum potassium on file in past 12 months     Recent Labs   Lab Test 12/27/18  0840   POTASSIUM 4.7

## 2020-01-02 DIAGNOSIS — R03.0 ELEVATED BLOOD PRESSURE READING WITHOUT DIAGNOSIS OF HYPERTENSION: ICD-10-CM

## 2020-01-02 RX ORDER — LISINOPRIL 5 MG/1
5 TABLET ORAL DAILY
Qty: 30 TABLET | Refills: 0 | Status: SHIPPED | OUTPATIENT
Start: 2020-01-02 | End: 2020-03-02

## 2020-02-05 DIAGNOSIS — I10 BENIGN ESSENTIAL HYPERTENSION: ICD-10-CM

## 2020-02-05 RX ORDER — AMLODIPINE BESYLATE 10 MG/1
10 TABLET ORAL DAILY
Qty: 30 TABLET | Refills: 0 | Status: SHIPPED | OUTPATIENT
Start: 2020-02-05 | End: 2020-04-01

## 2020-02-05 NOTE — TELEPHONE ENCOUNTER
"No appointment pending at this time.  Routing to provider to advise.    Tracy ALLANN, RN    Requested Prescriptions   Pending Prescriptions Disp Refills     amLODIPine (NORVASC) 10 MG tablet 90 tablet 0     Sig: Take 1 tablet (10 mg) by mouth daily       Calcium Channel Blockers Protocol  Failed - 2/5/2020  9:46 AM        Failed - Blood pressure under 140/90 in past 12 months     BP Readings from Last 3 Encounters:   12/02/19 (!) 154/83   09/12/19 (!) 141/84   06/10/19 135/83                 Failed - Recent (12 mo) or future (30 days) visit within the authorizing provider's specialty     Patient has had an office visit with the authorizing provider or a provider within the authorizing providers department within the previous 12 mos or has a future within next 30 days. See \"Patient Info\" tab in inbasket, or \"Choose Columns\" in Meds & Orders section of the refill encounter.              Failed - Normal serum creatinine on file in past 12 months     Recent Labs   Lab Test 12/27/18  0840   CR 0.76             Passed - Medication is active on med list        Passed - Patient is age 18 or older          "

## 2020-02-23 ENCOUNTER — HEALTH MAINTENANCE LETTER (OUTPATIENT)
Age: 45
End: 2020-02-23

## 2020-03-02 DIAGNOSIS — R03.0 ELEVATED BLOOD PRESSURE READING WITHOUT DIAGNOSIS OF HYPERTENSION: ICD-10-CM

## 2020-03-02 RX ORDER — LISINOPRIL 5 MG/1
5 TABLET ORAL DAILY
Qty: 15 TABLET | Refills: 0 | Status: SHIPPED | OUTPATIENT
Start: 2020-03-02 | End: 2020-03-17

## 2020-03-02 NOTE — TELEPHONE ENCOUNTER
"No appointment pending at this time.  Routing to provider to advise.    Tracy DYKES, RN    Requested Prescriptions   Pending Prescriptions Disp Refills     lisinopril (ZESTRIL) 5 MG tablet [Pharmacy Med Name: LISINOPRIL 5MG TABLET] 30 tablet 0     Sig: TAKE 1 TABLET (5 MG) BY MOUTH DAILY.LAST REFILL. APPOINTMENT NEEDED.       ACE Inhibitors (Including Combos) Protocol Failed - 3/2/2020  2:19 PM        Failed - Blood pressure under 140/90 in past 12 months     BP Readings from Last 3 Encounters:   12/02/19 (!) 154/83   09/12/19 (!) 141/84   06/10/19 135/83                 Failed - Recent (12 mo) or future (30 days) visit within the authorizing provider's specialty     Patient has had an office visit with the authorizing provider or a provider within the authorizing providers department within the previous 12 mos or has a future within next 30 days. See \"Patient Info\" tab in inbasket, or \"Choose Columns\" in Meds & Orders section of the refill encounter.              Failed - Normal serum creatinine on file in past 12 months     Recent Labs   Lab Test 12/27/18  0840   CR 0.76             Failed - Normal serum potassium on file in past 12 months     Recent Labs   Lab Test 12/27/18  0840   POTASSIUM 4.7             Passed - Medication is active on med list        Passed - Patient is age 18 or older          "

## 2020-03-17 DIAGNOSIS — R03.0 ELEVATED BLOOD PRESSURE READING WITHOUT DIAGNOSIS OF HYPERTENSION: ICD-10-CM

## 2020-03-17 RX ORDER — LISINOPRIL 5 MG/1
5 TABLET ORAL DAILY
Qty: 30 TABLET | Refills: 0 | Status: SHIPPED | OUTPATIENT
Start: 2020-03-17 | End: 2020-04-21

## 2020-03-23 ENCOUNTER — OFFICE VISIT (OUTPATIENT)
Dept: ORTHOPEDICS | Facility: CLINIC | Age: 45
End: 2020-03-23
Payer: COMMERCIAL

## 2020-03-23 VITALS
WEIGHT: 229.1 LBS | BODY MASS INDEX: 30.36 KG/M2 | SYSTOLIC BLOOD PRESSURE: 131 MMHG | DIASTOLIC BLOOD PRESSURE: 83 MMHG | HEIGHT: 73 IN | HEART RATE: 76 BPM

## 2020-03-23 DIAGNOSIS — M25.561 CHRONIC PAIN OF BOTH KNEES: ICD-10-CM

## 2020-03-23 DIAGNOSIS — G89.29 CHRONIC PAIN OF BOTH KNEES: ICD-10-CM

## 2020-03-23 DIAGNOSIS — M25.562 CHRONIC PAIN OF BOTH KNEES: ICD-10-CM

## 2020-03-23 DIAGNOSIS — M17.0 PRIMARY OSTEOARTHRITIS OF BOTH KNEES: Primary | ICD-10-CM

## 2020-03-23 PROCEDURE — 20610 DRAIN/INJ JOINT/BURSA W/O US: CPT | Mod: 50 | Performed by: ORTHOPAEDIC SURGERY

## 2020-03-23 RX ORDER — BUPIVACAINE HYDROCHLORIDE 2.5 MG/ML
4 INJECTION, SOLUTION INFILTRATION; PERINEURAL
Status: DISCONTINUED | OUTPATIENT
Start: 2020-03-23 | End: 2020-06-09

## 2020-03-23 RX ORDER — TRIAMCINOLONE ACETONIDE 40 MG/ML
80 INJECTION, SUSPENSION INTRA-ARTICULAR; INTRAMUSCULAR
Status: DISCONTINUED | OUTPATIENT
Start: 2020-03-23 | End: 2020-06-09

## 2020-03-23 RX ADMIN — BUPIVACAINE HYDROCHLORIDE 4 ML: 2.5 INJECTION, SOLUTION INFILTRATION; PERINEURAL at 16:31

## 2020-03-23 RX ADMIN — TRIAMCINOLONE ACETONIDE 80 MG: 40 INJECTION, SUSPENSION INTRA-ARTICULAR; INTRAMUSCULAR at 16:31

## 2020-03-23 ASSESSMENT — PAIN SCALES - GENERAL: PAINLEVEL: EXTREME PAIN (8)

## 2020-03-23 ASSESSMENT — MIFFLIN-ST. JEOR: SCORE: 1973.07

## 2020-03-23 NOTE — PROGRESS NOTES
Chief Complaint   Patient presents with     Knee Pain     Bilateral knee pain. Last injections: 12/2/19. Patient notes the injections worked great. He would like more injections today. Usually the right knee is worse, depends on the day.       HISTORY OF PRESENT ILLNESS:  Ayo Hernández is a 44 year old male seen for chronic bilateral knee pain, advanced primary osteoarthritis.  He returns today with pain, right more than left. Denies any swelling.He will have stiffness with standing. Also has pain with getting up after prolonged sitting. His last injections were on 12/2/2019 with kenalog. These worked well, lasted until about a week or so ago. Pain 8/10.    He's been off work the past week due to COVID-19, so hopes that will help with his knee pain as well.    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      No past medical history on file.    Past Surgical History:   Procedure Laterality Date     ARTHROSCOPY KNEE Right 7/15/2016    Procedure: ARTHROSCOPY KNEE;  Surgeon: Osvaldo Mccoy MD;  Location:  OR     ORTHOPEDIC SURGERY  08.2012    left knee, arthroscopy     ORTHOPEDIC SURGERY  07.15.2016    right knee       Medications:   Current Outpatient Medications:      amLODIPine (NORVASC) 10 MG tablet, Take 1 tablet (10 mg) by mouth daily Overdue for appointment/labs, Disp: 30 tablet, Rfl: 0     HYDROcodone-acetaminophen (NORCO) 5-325 MG per tablet, Take 1-2 tablets by mouth every 4 hours as needed for other (Moderate to Severe Pain), Disp: 40 tablet, Rfl: 0     ibuprofen (ADVIL,MOTRIN) 200 MG tablet, Take 200 mg by mouth every 4 hours as needed., Disp: , Rfl:      lisinopril (ZESTRIL) 5 MG tablet, Take 1 tablet (5 mg) by mouth daily, Disp: 30 tablet, Rfl: 0     sildenafil (REVATIO) 20 MG tablet, 1-2 tabs daily as needed for ed  Never use with nitroglycerin, terazosin or doxazosin., Disp: 20 tablet, Rfl: 3      sildenafil (VIAGRA) 50 MG tablet, Take 0.5-1 tablets (25-50 mg) by mouth daily as needed Take 30 min to 4 hours before intercourse To ER if chest pain, shortness of breath , prolonged erections, Disp: 30 tablet, Rfl: 1     tadalafil (CIALIS) 10 MG tablet, Take 0.5-1 tablets (5-10 mg) by mouth daily as needed for erectile dysfunction Never use with nitroglycerin, terazosin or doxazosin., Disp: 12 tablet, Rfl: 4    Current Facility-Administered Medications:      betamethasone acet & sod phos (CELESTONE) injection 12 mg, 12 mg, , , Osvaldo Mccoy MD, 12 mg at 09/12/19 1818     betamethasone acet & sod phos (CELESTONE) injection 12 mg, 12 mg, , , Osvaldo Mccoy MD, 12 mg at 09/12/19 1818     bupivacaine (MARCAINE) 0.25 % injection 3 mL, 3 mL, , , Osvaldo Mccoy MD, 3 mL at 12/02/19 1551     bupivacaine (MARCAINE) 0.25 % injection 3 mL, 3 mL, , , Osvaldo Mccoy MD, 3 mL at 12/02/19 1551     bupivacaine (MARCAINE) 0.25 % injection 3 mL, 3 mL, , , Osvaldo Mccoy MD, 3 mL at 09/12/19 1818     bupivacaine (MARCAINE) 0.25 % injection 3 mL, 3 mL, , , Osvaldo Mccoy MD, 3 mL at 09/12/19 1818     bupivacaine (MARCAINE) 0.25 % injection 3 mL, 3 mL, , , Osvaldo Mccoy MD, 3 mL at 10/11/18 1554     bupivacaine (MARCAINE) 0.25 % injection 3 mL, 3 mL, , , Osvaldo Mccoy MD, 3 mL at 10/11/18 1554     lidocaine 1 % injection 4 mL, 4 mL, , , Osvaldo Mccoy MD, 4 mL at 12/02/19 1551     lidocaine 1 % injection 4 mL, 4 mL, , , Osvaldo Mccoy MD, 4 mL at 12/02/19 1551     lidocaine 1 % injection 4 mL, 4 mL, , , Osvaldo Mccoy MD, 4 mL at 09/12/19 1818     lidocaine 1 % injection 4 mL, 4 mL, , , Osvaldo Mccoy MD, 4 mL at 09/12/19 1818     lidocaine 1 % injection 4 mL, 4 mL, , , Osvadlo Mccoy MD, 4 mL at 10/11/18 1554     lidocaine 1 % injection 4 mL, 4 mL, , , Osvaldo Mccoy MD, 4 mL at 10/11/18 1554     triamcinolone (KENALOG-40) injection 80 mg, 80 mg, , , Osvaldo Mccoy MD, 80  "mg at 12/02/19 1551     triamcinolone (KENALOG-40) injection 80 mg, 80 mg, , , Osvaldo Mccoy MD, 80 mg at 12/02/19 1551    Allergies: No Known Allergies       REVIEW OF SYSTEMS:   CONSTITUTIONAL:NEGATIVE for fever, chills, night sweats  INTEGUMENTARY/SKIN: NEGATIVE for worrisome wound problems or redness.  MUSCULOSKELETAL:See HPI above  NEURO: NEGATIVE for weakness, dizziness or paresthesias      PHYSICAL EXAM:  /83   Pulse 76   Ht 1.854 m (6' 1\")   Wt 103.9 kg (229 lb 1.6 oz)   BMI 30.23 kg/m     GENERAL APPEARANCE: healthy, alert, no distress  SKIN: no suspicious lesions or rashes  NEURO: Normal strength and tone, mentation intact and speech normal  PSYCH:  mentation appears normal and affect normal, not anxious  RESPIRATORY: No increased work of breathing.    Gait: normal  Alignment: varus    No Quad atrophy, strength normal.  Intact sensation deep peroneal nerve, superficial peroneal nerve, med/lat tibial nerve, sural nerve, saphenous nerve  Intact EHL, EDL, TA, FHL, GS, quadriceps hamstrings and hip flexors  Toes warm and well perfused, brisk capillary refill. Palpable 2+ dp pulses.  calf soft and nttp or squeeze.  Edema: none    right  KNEE EXAM:    Skin: intact, no ecchymosis.  ROM: 0 degrees extension to 110 degrees flexion  Effusion:trace  Tender: medial joint line.   Positive crepitus.  Palpable marginal osteophytes    Left knee exam:  Skin intact. No erythema or ecchymosis.  ROM: 0 extension to 100 flexion  Effusion: trace  Tender to palpation medial joint line, lateral joint line  Positive medial and patello-femoral crepitus.  Palpable marginal osteophytes.      X-RAY: no new images today.    3 views bilateral knees taken on 2/5/2018 were reviewed in clinic today.    Left: Advanced lateral compartment degenerative changes similar to the prior study, bone on bone with articular flattening, subchondral sclerosis. Moderate patellofemoral degenerative changes with large marginal " osteophytes.     Right: Moderate patellofemoral degenerative changes, prominent marginal osteophyte. Marginal osteophyte formation in the lateral compartment without significant joint space loss.     Overall there is mild progression of degenerative changes since a comparison study.      Impression: Ayo Hernández is a 44 year old male with chronic bilateral knee pain, advanced primary osteoarthritis.      Plan:   * WB status: weightbearing per comfort.    * Rest  * Activity modification - avoid activities that aggravate symptoms.  * NSAIDS - regular use for inflammation, with food, as long as no contra-indications. Please discuss with pcp if needed.  * Ice twice daily to three times daily as needed.  * Compression wrap as needed.  * Elevation of extremity to reduce swelling as needed.  * Injections: risks and perceived benefits discussed today. Patient elects to proceed bilaterally. Will use kenalog again today.  * Tylenol as needed for pain  * return to clinic as needed      Osvaldo Mccoy M.D., M.S.  Dept. of Orthopaedic Surgery  [facility]    Large Joint Injection/Arthocentesis: bilateral knee    Date/Time: 3/23/2020 4:31 PM  Performed by: Van Napoles PA  Authorized by: Osvaldo Mccoy MD     Indications:  Pain and osteoarthritis  Needle Size:  22 G  Guidance: landmark guided    Approach:  Anteromedial  Location:  Knee  Laterality:  Bilateral      Medications (Right):  4 mL bupivacaine 0.25 %; 80 mg triamcinolone 40 MG/ML  Medications (Left):  4 mL bupivacaine 0.25 %; 80 mg triamcinolone 40 MG/ML  Outcome:  Tolerated well, no immediate complications  Procedure discussed: discussed risks, benefits, and alternatives    Consent Given by:  Patient  Prep: patient was prepped and draped in usual sterile fashion

## 2020-03-23 NOTE — LETTER
3/23/2020         RE: Ayo Hernández  [address]  [place] [address]        Dear Colleague,    Thank you for referring your patient, Ayo Hernández, to the [facility]. Please see a copy of my visit note below.    Chief Complaint   Patient presents with     Knee Pain     Bilateral knee pain. Last injections: 12/2/19. Patient notes the injections worked great. He would like more injections today. Usually the right knee is worse, depends on the day.       HISTORY OF PRESENT ILLNESS:  Ayo Hernández is a 44 year old male seen for chronic bilateral knee pain, advanced primary osteoarthritis.  He returns today with pain, right more than left. Denies any swelling.He will have stiffness with standing. Also has pain with getting up after prolonged sitting. His last injections were on 12/2/2019 with kenalog. These worked well, lasted until about a week or so ago. Pain 8/10.    He's been off work the past week due to COVID-19, so hopes that will help with his knee pain as well.    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      No past medical history on file.    Past Surgical History:   Procedure Laterality Date     ARTHROSCOPY KNEE Right 7/15/2016    Procedure: ARTHROSCOPY KNEE;  Surgeon: Osvaldo Mccoy MD;  Location:  OR     ORTHOPEDIC SURGERY  08.2012    left knee, arthroscopy     ORTHOPEDIC SURGERY  07.15.2016    right knee       Medications:   Current Outpatient Medications:      amLODIPine (NORVASC) 10 MG tablet, Take 1 tablet (10 mg) by mouth daily Overdue for appointment/labs, Disp: 30 tablet, Rfl: 0     HYDROcodone-acetaminophen (NORCO) 5-325 MG per tablet, Take 1-2 tablets by mouth every 4 hours as needed for other (Moderate to Severe Pain), Disp: 40 tablet, Rfl: 0     ibuprofen (ADVIL,MOTRIN) 200 MG tablet, Take 200 mg by mouth every 4 hours as needed., Disp: ,  Rfl:      lisinopril (ZESTRIL) 5 MG tablet, Take 1 tablet (5 mg) by mouth daily, Disp: 30 tablet, Rfl: 0     sildenafil (REVATIO) 20 MG tablet, 1-2 tabs daily as needed for ed  Never use with nitroglycerin, terazosin or doxazosin., Disp: 20 tablet, Rfl: 3     sildenafil (VIAGRA) 50 MG tablet, Take 0.5-1 tablets (25-50 mg) by mouth daily as needed Take 30 min to 4 hours before intercourse To ER if chest pain, shortness of breath , prolonged erections, Disp: 30 tablet, Rfl: 1     tadalafil (CIALIS) 10 MG tablet, Take 0.5-1 tablets (5-10 mg) by mouth daily as needed for erectile dysfunction Never use with nitroglycerin, terazosin or doxazosin., Disp: 12 tablet, Rfl: 4    Current Facility-Administered Medications:      betamethasone acet & sod phos (CELESTONE) injection 12 mg, 12 mg, , , Osvaldo Mccoy MD, 12 mg at 09/12/19 1818     betamethasone acet & sod phos (CELESTONE) injection 12 mg, 12 mg, , , Osvaldo Mccoy MD, 12 mg at 09/12/19 1818     bupivacaine (MARCAINE) 0.25 % injection 3 mL, 3 mL, , , Osvaldo Mccoy MD, 3 mL at 12/02/19 1551     bupivacaine (MARCAINE) 0.25 % injection 3 mL, 3 mL, , , Osvaldo Mccoy MD, 3 mL at 12/02/19 1551     bupivacaine (MARCAINE) 0.25 % injection 3 mL, 3 mL, , , Osvaldo Mccoy MD, 3 mL at 09/12/19 1818     bupivacaine (MARCAINE) 0.25 % injection 3 mL, 3 mL, , , Osvaldo Mccoy MD, 3 mL at 09/12/19 1818     bupivacaine (MARCAINE) 0.25 % injection 3 mL, 3 mL, , , Osvaldo Mccoy MD, 3 mL at 10/11/18 1554     bupivacaine (MARCAINE) 0.25 % injection 3 mL, 3 mL, , , Osvaldo Mccoy MD, 3 mL at 10/11/18 1554     lidocaine 1 % injection 4 mL, 4 mL, , , Osvaldo Mccoy MD, 4 mL at 12/02/19 1551     lidocaine 1 % injection 4 mL, 4 mL, , , Osvaldo Mccoy MD, 4 mL at 12/02/19 1551     lidocaine 1 % injection 4 mL, 4 mL, , , Osvaldo Mccoy MD, 4 mL at 09/12/19 1818     lidocaine 1 % injection 4 mL, 4 mL, , , Osvaldo Mccoy MD, 4 mL at 09/12/19 1818     " lidocaine 1 % injection 4 mL, 4 mL, , , Osvaldo Mccoy MD, 4 mL at 10/11/18 1554     lidocaine 1 % injection 4 mL, 4 mL, , , Osvaldo Mccoy MD, 4 mL at 10/11/18 1554     triamcinolone (KENALOG-40) injection 80 mg, 80 mg, , , Osvaldo Mccoy MD, 80 mg at 12/02/19 1551     triamcinolone (KENALOG-40) injection 80 mg, 80 mg, , , Osvaldo Mccoy MD, 80 mg at 12/02/19 1551    Allergies: No Known Allergies       REVIEW OF SYSTEMS:   CONSTITUTIONAL:NEGATIVE for fever, chills, night sweats  INTEGUMENTARY/SKIN: NEGATIVE for worrisome wound problems or redness.  MUSCULOSKELETAL:See HPI above  NEURO: NEGATIVE for weakness, dizziness or paresthesias      PHYSICAL EXAM:  /83   Pulse 76   Ht 1.854 m (6' 1\")   Wt 103.9 kg (229 lb 1.6 oz)   BMI 30.23 kg/m     GENERAL APPEARANCE: healthy, alert, no distress  SKIN: no suspicious lesions or rashes  NEURO: Normal strength and tone, mentation intact and speech normal  PSYCH:  mentation appears normal and affect normal, not anxious  RESPIRATORY: No increased work of breathing.    Gait: normal  Alignment: varus    No Quad atrophy, strength normal.  Intact sensation deep peroneal nerve, superficial peroneal nerve, med/lat tibial nerve, sural nerve, saphenous nerve  Intact EHL, EDL, TA, FHL, GS, quadriceps hamstrings and hip flexors  Toes warm and well perfused, brisk capillary refill. Palpable 2+ dp pulses.  calf soft and nttp or squeeze.  Edema: none    right  KNEE EXAM:    Skin: intact, no ecchymosis.  ROM: 0 degrees extension to 110 degrees flexion  Effusion:trace  Tender: medial joint line.   Positive crepitus.  Palpable marginal osteophytes    Left knee exam:  Skin intact. No erythema or ecchymosis.  ROM: 0 extension to 100 flexion  Effusion: trace  Tender to palpation medial joint line, lateral joint line  Positive medial and patello-femoral crepitus.  Palpable marginal osteophytes.      X-RAY: no new images today.    3 views bilateral knees taken on 2/5/2018 " were reviewed in clinic today.    Left: Advanced lateral compartment degenerative changes similar to the prior study, bone on bone with articular flattening, subchondral sclerosis. Moderate patellofemoral degenerative changes with large marginal osteophytes.     Right: Moderate patellofemoral degenerative changes, prominent marginal osteophyte. Marginal osteophyte formation in the lateral compartment without significant joint space loss.     Overall there is mild progression of degenerative changes since a comparison study.      Impression: Ayo Hernández is a 44 year old male with chronic bilateral knee pain, advanced primary osteoarthritis.      Plan:   * WB status: weightbearing per comfort.    * Rest  * Activity modification - avoid activities that aggravate symptoms.  * NSAIDS - regular use for inflammation, with food, as long as no contra-indications. Please discuss with pcp if needed.  * Ice twice daily to three times daily as needed.  * Compression wrap as needed.  * Elevation of extremity to reduce swelling as needed.  * Injections: risks and perceived benefits discussed today. Patient elects to proceed bilaterally. Will use kenalog again today.  * Tylenol as needed for pain  * return to clinic as needed      Osvaldo Mccoy M.D., M.S.  Dept. of Orthopaedic Surgery  [facility]    Large Joint Injection/Arthocentesis: bilateral knee    Date/Time: 3/23/2020 4:31 PM  Performed by: Van Napoles PA  Authorized by: Osvaldo Mccoy MD     Indications:  Pain and osteoarthritis  Needle Size:  22 G  Guidance: landmark guided    Approach:  Anteromedial  Location:  Knee  Laterality:  Bilateral      Medications (Right):  4 mL bupivacaine 0.25 %; 80 mg triamcinolone 40 MG/ML  Medications (Left):  4 mL bupivacaine 0.25 %; 80 mg triamcinolone 40 MG/ML  Outcome:  Tolerated well, no immediate complications  Procedure discussed: discussed risks, benefits, and alternatives    Consent Given by:   Patient  Prep: patient was prepped and draped in usual sterile fashion            Again, thank you for allowing me to participate in the care of your patient.        Sincerely,        Osvaldo Mccoy MD

## 2020-04-01 DIAGNOSIS — I10 BENIGN ESSENTIAL HYPERTENSION: ICD-10-CM

## 2020-04-01 RX ORDER — AMLODIPINE BESYLATE 10 MG/1
10 TABLET ORAL DAILY
Qty: 90 TABLET | Refills: 0 | Status: SHIPPED | OUTPATIENT
Start: 2020-04-01 | End: 2020-04-05

## 2020-04-05 DIAGNOSIS — I10 BENIGN ESSENTIAL HYPERTENSION: ICD-10-CM

## 2020-04-05 RX ORDER — AMLODIPINE BESYLATE 10 MG/1
TABLET ORAL
Qty: 90 TABLET | Refills: 0 | Status: SHIPPED | OUTPATIENT
Start: 2020-04-05 | End: 2020-06-09

## 2020-04-20 DIAGNOSIS — R03.0 ELEVATED BLOOD PRESSURE READING WITHOUT DIAGNOSIS OF HYPERTENSION: ICD-10-CM

## 2020-04-20 NOTE — LETTER
April 21, 2020    Ayo Hernández  [address] [place] [address]    Dear Ayo,       We recently received a refill request for lisinopril (ZESTRIL) 5 MG tablet .  We have refilled this for a one time 30 day supply only because you are due for a:    Blood pressure/medication check office visit-in the next 2-4 weeks. Please come in fasting to this appointment, so you can have labs drawn too.      Please call at your earliest convenience so that there will not be a delay with your future refills.          Thank you,   Your [facility]/  [phone]

## 2020-04-21 RX ORDER — LISINOPRIL 5 MG/1
5 TABLET ORAL DAILY
Qty: 30 TABLET | Refills: 0 | Status: SHIPPED | OUTPATIENT
Start: 2020-04-21 | End: 2020-05-18

## 2020-04-21 NOTE — TELEPHONE ENCOUNTER
Please help set-up md appointment and can do fasting labs at that time. In next 2-4 weeks. Juan Pablo Kimbrough MD

## 2020-04-21 NOTE — TELEPHONE ENCOUNTER
Routing refill request to provider for review/approval because:  Labs not current:  Creatinine, potassium, recent visit    No appointment pending at this time.  Routing to provider to advise.    Tracy Fox BSN, RN

## 2020-05-16 DIAGNOSIS — R03.0 ELEVATED BLOOD PRESSURE READING WITHOUT DIAGNOSIS OF HYPERTENSION: ICD-10-CM

## 2020-05-18 RX ORDER — LISINOPRIL 5 MG/1
TABLET ORAL
Qty: 30 TABLET | Refills: 0 | Status: SHIPPED | OUTPATIENT
Start: 2020-05-18 | End: 2020-06-09

## 2020-05-18 NOTE — TELEPHONE ENCOUNTER
Called and spoke to the patient @ [phone]. I have scheduled him for an office visit with Dr. Kimbrough on 6/09/2020. Patient advised to please come in fasting to do labs at that time.  KIMBERLY Frazier

## 2020-05-18 NOTE — TELEPHONE ENCOUNTER
Please help set-up md appointment in next 3-4 weeks -we can do fasting labs at appointment. Juan Pablo Kimbrough MD

## 2020-05-18 NOTE — TELEPHONE ENCOUNTER
"Routing refill request to provider for review/approval because:  Requested Prescriptions   Pending Prescriptions Disp Refills    lisinopril (ZESTRIL) 5 MG tablet [Pharmacy Med Name: LISINOPRIL 5MG TABLET] 30 tablet 0     Sig: TAKE 1 TABLET BY MOUTH DAILY       ACE Inhibitors (Including Combos) Protocol Failed - 5/16/2020  1:01 AM        Failed - Recent (12 mo) or future (30 days) visit within the authorizing provider's specialty     Patient has had an office visit with the authorizing provider or a provider within the authorizing providers department within the previous 12 mos or has a future within next 30 days. See \"Patient Info\" tab in inbasket, or \"Choose Columns\" in Meds & Orders section of the refill encounter.              Failed - Normal serum creatinine on file in past 12 months     Recent Labs   Lab Test 12/27/18  0840   CR 0.76       Ok to refill medication if creatinine is low          Failed - Normal serum potassium on file in past 12 months     Recent Labs   Lab Test 12/27/18  0840   POTASSIUM 4.7             Passed - Blood pressure under 140/90 in past 12 months     BP Readings from Last 3 Encounters:   03/23/20 131/83   12/02/19 (!) 154/83   09/12/19 (!) 141/84                 Passed - Medication is active on med list        Passed - Patient is age 18 or older                 Diana ALLANN, RN, CPN          "

## 2020-06-09 ENCOUNTER — OFFICE VISIT (OUTPATIENT)
Dept: FAMILY MEDICINE | Facility: CLINIC | Age: 45
End: 2020-06-09
Payer: COMMERCIAL

## 2020-06-09 VITALS
WEIGHT: 233 LBS | BODY MASS INDEX: 30.74 KG/M2 | DIASTOLIC BLOOD PRESSURE: 76 MMHG | TEMPERATURE: 97.4 F | HEART RATE: 69 BPM | SYSTOLIC BLOOD PRESSURE: 116 MMHG | OXYGEN SATURATION: 96 %

## 2020-06-09 DIAGNOSIS — I10 BENIGN ESSENTIAL HYPERTENSION: ICD-10-CM

## 2020-06-09 DIAGNOSIS — I10 HYPERTENSION GOAL BP (BLOOD PRESSURE) < 140/90: Primary | ICD-10-CM

## 2020-06-09 DIAGNOSIS — Z12.5 SCREENING PSA (PROSTATE SPECIFIC ANTIGEN): ICD-10-CM

## 2020-06-09 DIAGNOSIS — Z13.6 CARDIOVASCULAR SCREENING; LDL GOAL LESS THAN 130: ICD-10-CM

## 2020-06-09 LAB
ALBUMIN SERPL-MCNC: 3.5 G/DL (ref 3.4–5)
ANION GAP SERPL CALCULATED.3IONS-SCNC: 6 MMOL/L (ref 3–14)
BUN SERPL-MCNC: 17 MG/DL (ref 7–30)
CALCIUM SERPL-MCNC: 8.9 MG/DL (ref 8.5–10.1)
CHLORIDE SERPL-SCNC: 105 MMOL/L (ref 94–109)
CHOLEST SERPL-MCNC: 183 MG/DL
CO2 SERPL-SCNC: 28 MMOL/L (ref 20–32)
CREAT SERPL-MCNC: 0.87 MG/DL (ref 0.66–1.25)
GFR SERPL CREATININE-BSD FRML MDRD: >90 ML/MIN/{1.73_M2}
GLUCOSE SERPL-MCNC: 96 MG/DL (ref 70–99)
HDLC SERPL-MCNC: 98 MG/DL
LDLC SERPL CALC-MCNC: 78 MG/DL
NONHDLC SERPL-MCNC: 85 MG/DL
PHOSPHATE SERPL-MCNC: 1.8 MG/DL (ref 2.5–4.5)
POTASSIUM SERPL-SCNC: 4.8 MMOL/L (ref 3.4–5.3)
PSA SERPL-ACNC: 0.29 UG/L (ref 0–4)
SODIUM SERPL-SCNC: 139 MMOL/L (ref 133–144)
TRIGL SERPL-MCNC: 35 MG/DL

## 2020-06-09 PROCEDURE — 99214 OFFICE O/P EST MOD 30 MIN: CPT | Performed by: FAMILY MEDICINE

## 2020-06-09 PROCEDURE — 80061 LIPID PANEL: CPT | Performed by: FAMILY MEDICINE

## 2020-06-09 PROCEDURE — 80069 RENAL FUNCTION PANEL: CPT | Performed by: FAMILY MEDICINE

## 2020-06-09 PROCEDURE — G0103 PSA SCREENING: HCPCS | Performed by: FAMILY MEDICINE

## 2020-06-09 PROCEDURE — 36415 COLL VENOUS BLD VENIPUNCTURE: CPT | Performed by: FAMILY MEDICINE

## 2020-06-09 RX ORDER — AMLODIPINE BESYLATE 10 MG/1
10 TABLET ORAL DAILY
Qty: 90 TABLET | Refills: 3 | Status: SHIPPED | OUTPATIENT
Start: 2020-06-09 | End: 2020-09-28

## 2020-06-09 NOTE — NURSING NOTE
"Chief Complaint   Patient presents with     Hypertension       Initial /76   Pulse 69   Temp 97.4  F (36.3  C) (Tympanic)   Wt 105.7 kg (233 lb)   SpO2 96%   BMI 30.74 kg/m   Estimated body mass index is 30.74 kg/m  as calculated from the following:    Height as of 3/23/20: 1.854 m (6' 1\").    Weight as of this encounter: 105.7 kg (233 lb).  Medication Reconciliation: complete    STEFFANIE Greco MA    "

## 2020-06-09 NOTE — PROGRESS NOTES
SUBJECTIVE:  Ayo Hernández, a 44 year old male scheduled an appointment to discuss the following issues:  Follow-up htn and ed. Cholesterol ok in past.   Working out at home and virtual training. Outside blood pressure ok. Average 120-130. Interested in holding lisinopril. Monitoring sodium. No chest pain or shortness of breath.   No nausea, vomiting or diarrhea or black/bloody stools. No urine changes or hematuria. Emotionally doing. Cardiac/ weight lifting.   Medical, social, surgical, and family histories reviewed.    ROS:  All other ROS negative  OBJECTIVE:  /76   Pulse 69   Temp 97.4  F (36.3  C) (Tympanic)   Wt 105.7 kg (233 lb)   SpO2 96%   BMI 30.74 kg/m    EXAM:  GENERAL APPEARANCE: healthy, alert and no distress  EYES: EOMI,  PERRL  NECK: no adenopathy, no asymmetry, masses, or scars and thyroid normal to palpation  RESP: lungs clear to auscultation - no rales, rhonchi or wheezes  CV: regular rates and rhythm, normal S1 S2, no S3 or S4 and no murmur, click or rub -  ABDOMEN:  soft, nontender, no HSM or masses and bowel sounds normal  MS: extremities normal- no gross deformities noted, no evidence of inflammation in joints, FROM in all extremities.  PSYCH: mentation appears normal and affect normal/bright    ASSESSMENT / PLAN:  (I10) Hypertension goal BP (blood pressure) < 140/90  (primary encounter diagnosis)  Comment: stable  Plan: Renal panel (Alb, BUN, Ca, Cl, CO2, Creat,         Gluc, Phos, K, Na)        amLODIPine (NORVASC) 10 MG tablet. Will drop lisinopril and continue closely monitor. Chest pain or shortness of breath to er. Restart lisinopril if needed. Lower sodium diet and continue exercise    (Z13.6) CARDIOVASCULAR SCREENING; LDL GOAL LESS THAN 130  Comment: ok in past  Plan: Lipid Profile (Chol, Trig, HDL, LDL calc)        ?fish oil. exercise    (Z12.5) Screening PSA (prostate specific antigen)  Plan: PSA, screen          Patient doesn't need ed meds at this point.     Juan Pablo Mcclain  MD Luis E

## 2020-06-15 DIAGNOSIS — R03.0 ELEVATED BLOOD PRESSURE READING WITHOUT DIAGNOSIS OF HYPERTENSION: ICD-10-CM

## 2020-06-15 RX ORDER — LISINOPRIL 5 MG/1
TABLET ORAL
Qty: 30 TABLET | Refills: 0 | OUTPATIENT
Start: 2020-06-15

## 2020-07-15 ENCOUNTER — OFFICE VISIT (OUTPATIENT)
Dept: ORTHOPEDICS | Facility: CLINIC | Age: 45
End: 2020-07-15
Payer: COMMERCIAL

## 2020-07-15 VITALS
HEIGHT: 73 IN | WEIGHT: 233 LBS | DIASTOLIC BLOOD PRESSURE: 100 MMHG | SYSTOLIC BLOOD PRESSURE: 157 MMHG | BODY MASS INDEX: 30.88 KG/M2

## 2020-07-15 DIAGNOSIS — M17.0 PRIMARY OSTEOARTHRITIS OF BOTH KNEES: Primary | ICD-10-CM

## 2020-07-15 PROCEDURE — 20610 DRAIN/INJ JOINT/BURSA W/O US: CPT | Mod: 50 | Performed by: ORTHOPAEDIC SURGERY

## 2020-07-15 RX ORDER — BUPIVACAINE HYDROCHLORIDE 2.5 MG/ML
4 INJECTION, SOLUTION INFILTRATION; PERINEURAL
Status: DISCONTINUED | OUTPATIENT
Start: 2020-07-15 | End: 2020-10-19

## 2020-07-15 RX ORDER — TRIAMCINOLONE ACETONIDE 40 MG/ML
80 INJECTION, SUSPENSION INTRA-ARTICULAR; INTRAMUSCULAR
Status: DISCONTINUED | OUTPATIENT
Start: 2020-07-15 | End: 2020-10-19

## 2020-07-15 RX ADMIN — TRIAMCINOLONE ACETONIDE 80 MG: 40 INJECTION, SUSPENSION INTRA-ARTICULAR; INTRAMUSCULAR at 12:00

## 2020-07-15 RX ADMIN — BUPIVACAINE HYDROCHLORIDE 4 ML: 2.5 INJECTION, SOLUTION INFILTRATION; PERINEURAL at 12:00

## 2020-07-15 ASSESSMENT — PAIN SCALES - GENERAL: PAINLEVEL: MODERATE PAIN (4)

## 2020-07-15 ASSESSMENT — MIFFLIN-ST. JEOR: SCORE: 1985.76

## 2020-07-15 NOTE — PROGRESS NOTES
"Chief Complaint   Patient presents with     Knee Pain     bilateral knee oa. last injections on 3/23/20 with kenalog       HISTORY OF PRESENT ILLNESS:  Ayo Hernández is a 45 year old male seen for chronic bilateral knee pain, advanced primary osteoarthritis.  He returns today with pain, right more than left. Denies any swelling.He will have stiffness with standing. Also has pain with getting up after prolonged sitting. His last injections were on 3/23/2020 with kenalog. These worked well. Pain 4/10. Would like repeat injections.     He's returned to work.    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      No past medical history on file.    Past Surgical History:   Procedure Laterality Date     ARTHROSCOPY KNEE Right 7/15/2016    Procedure: ARTHROSCOPY KNEE;  Surgeon: Osvaldo Mccoy MD;  Location:  OR     ORTHOPEDIC SURGERY  08.2012    left knee, arthroscopy     ORTHOPEDIC SURGERY  07.15.2016    right knee       Medications:   Current Outpatient Medications:      amLODIPine (NORVASC) 10 MG tablet, Take 1 tablet (10 mg) by mouth daily, Disp: 90 tablet, Rfl: 3     ibuprofen (ADVIL,MOTRIN) 200 MG tablet, Take 200 mg by mouth every 4 hours as needed., Disp: , Rfl:     Allergies: No Known Allergies       REVIEW OF SYSTEMS:   CONSTITUTIONAL:NEGATIVE for fever, chills, night sweats  INTEGUMENTARY/SKIN: NEGATIVE for worrisome wound problems or redness.  MUSCULOSKELETAL:See HPI above  NEURO: NEGATIVE for weakness, dizziness or paresthesias      PHYSICAL EXAM:  BP (!) 157/100   Ht 1.854 m (6' 1\")   Wt 105.7 kg (233 lb)   BMI 30.74 kg/m     GENERAL APPEARANCE: healthy, alert, no distress  SKIN: no suspicious lesions or rashes  NEURO: Normal strength and tone, mentation intact and speech normal  PSYCH:  mentation appears normal and affect normal, not anxious  RESPIRATORY: No increased work of breathing.    Gait: " normal  Alignment: varus    No Quad atrophy, strength normal.  Intact sensation deep peroneal nerve, superficial peroneal nerve, med/lat tibial nerve, sural nerve, saphenous nerve  Intact EHL, EDL, TA, FHL, GS, quadriceps hamstrings and hip flexors  Toes warm and well perfused, brisk capillary refill. Palpable 2+ dp pulses.  calf soft and nttp or squeeze.  Edema: none    right  KNEE EXAM:    Skin: intact, no ecchymosis.  ROM: 0 degrees extension to 110 degrees flexion  Effusion:trace  Tender: medial joint line.   Positive crepitus.  Palpable marginal osteophytes    Left knee exam:  Skin intact. No erythema or ecchymosis.  ROM: 0 extension to 100 flexion  Effusion: trace  Tender to palpation medial joint line, lateral joint line  Positive medial and patello-femoral crepitus.  Palpable marginal osteophytes.      X-RAY: no new images today.    3 views bilateral knees taken on 2/5/2018 were reviewed in clinic today.    Left: Advanced lateral compartment degenerative changes similar to the prior study, bone on bone with articular flattening, subchondral sclerosis. Moderate patellofemoral degenerative changes with large marginal osteophytes.     Right: Moderate patellofemoral degenerative changes, prominent marginal osteophyte. Marginal osteophyte formation in the lateral compartment without significant joint space loss.     Overall there is mild progression of degenerative changes since a comparison study.      Impression: Ayo Hernández is a 45 year old male with chronic bilateral knee pain, advanced primary osteoarthritis.      Plan:   * WB status: weightbearing per comfort.    * Rest  * Activity modification - avoid activities that aggravate symptoms.  * NSAIDS - regular use for inflammation, with food, as long as no contra-indications. Please discuss with pcp if needed.  * Ice twice daily to three times daily as needed.  * Compression wrap as needed.  * Elevation of extremity to reduce swelling as needed.  *  Injections: risks and perceived benefits discussed today. Patient elects to proceed bilaterally. Will use kenalog again today.  * Tylenol as needed for pain  * return to clinic as needed    Ayo to follow up with Primary Care provider regarding elevated blood pressure.      Osvaldo Mccoy M.D., M.S.  Dept. of Orthopaedic Surgery  [facility]    Large Joint Injection/Arthocentesis: bilateral knee    Date/Time: 7/15/2020 12:00 PM  Performed by: Van Napoles PA  Authorized by: Osvaldo Mccoy MD     Indications:  Pain and osteoarthritis  Needle Size:  22 G  Guidance: landmark guided    Approach:  Anteromedial  Location:  Knee  Laterality:  Bilateral      Medications (Right):  4 mL bupivacaine 0.25 %; 80 mg triamcinolone 40 MG/ML  Medications (Left):  4 mL bupivacaine 0.25 %; 80 mg triamcinolone 40 MG/ML  Outcome:  Tolerated well, no immediate complications  Procedure discussed: discussed risks, benefits, and alternatives    Consent Given by:  Patient  Prep: patient was prepped and draped in usual sterile fashion

## 2020-07-15 NOTE — LETTER
"    7/15/2020         RE: Ayo Hernández  [address]  [facility] [address]        Dear Colleague,    Thank you for referring your patient, Ayo Hernández, to the [facility] [place]. Please see a copy of my visit note below.    Chief Complaint   Patient presents with     Knee Pain     bilateral knee oa. last injections on 3/23/20 with kenalog       HISTORY OF PRESENT ILLNESS:  Ayo Hernández is a 45 year old male seen for chronic bilateral knee pain, advanced primary osteoarthritis.  He returns today with pain, right more than left. Denies any swelling.He will have stiffness with standing. Also has pain with getting up after prolonged sitting. His last injections were on 3/23/2020 with kenalog. These worked well. Pain 4/10. Would like repeat injections.     He's returned to work.    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      No past medical history on file.    Past Surgical History:   Procedure Laterality Date     ARTHROSCOPY KNEE Right 7/15/2016    Procedure: ARTHROSCOPY KNEE;  Surgeon: Osvaldo Mccoy MD;  Location:  OR     ORTHOPEDIC SURGERY  08.2012    left knee, arthroscopy     ORTHOPEDIC SURGERY  07.15.2016    right knee       Medications:   Current Outpatient Medications:      amLODIPine (NORVASC) 10 MG tablet, Take 1 tablet (10 mg) by mouth daily, Disp: 90 tablet, Rfl: 3     ibuprofen (ADVIL,MOTRIN) 200 MG tablet, Take 200 mg by mouth every 4 hours as needed., Disp: , Rfl:     Allergies: No Known Allergies       REVIEW OF SYSTEMS:   CONSTITUTIONAL:NEGATIVE for fever, chills, night sweats  INTEGUMENTARY/SKIN: NEGATIVE for worrisome wound problems or redness.  MUSCULOSKELETAL:See HPI above  NEURO: NEGATIVE for weakness, dizziness or paresthesias      PHYSICAL EXAM:  BP (!) 157/100   Ht 1.854 m (6' 1\")   Wt 105.7 kg (233 lb)   BMI 30.74 kg/m     GENERAL APPEARANCE: " healthy, alert, no distress  SKIN: no suspicious lesions or rashes  NEURO: Normal strength and tone, mentation intact and speech normal  PSYCH:  mentation appears normal and affect normal, not anxious  RESPIRATORY: No increased work of breathing.    Gait: normal  Alignment: varus    No Quad atrophy, strength normal.  Intact sensation deep peroneal nerve, superficial peroneal nerve, med/lat tibial nerve, sural nerve, saphenous nerve  Intact EHL, EDL, TA, FHL, GS, quadriceps hamstrings and hip flexors  Toes warm and well perfused, brisk capillary refill. Palpable 2+ dp pulses.  calf soft and nttp or squeeze.  Edema: none    right  KNEE EXAM:    Skin: intact, no ecchymosis.  ROM: 0 degrees extension to 110 degrees flexion  Effusion:trace  Tender: medial joint line.   Positive crepitus.  Palpable marginal osteophytes    Left knee exam:  Skin intact. No erythema or ecchymosis.  ROM: 0 extension to 100 flexion  Effusion: trace  Tender to palpation medial joint line, lateral joint line  Positive medial and patello-femoral crepitus.  Palpable marginal osteophytes.      X-RAY: no new images today.    3 views bilateral knees taken on 2/5/2018 were reviewed in clinic today.    Left: Advanced lateral compartment degenerative changes similar to the prior study, bone on bone with articular flattening, subchondral sclerosis. Moderate patellofemoral degenerative changes with large marginal osteophytes.     Right: Moderate patellofemoral degenerative changes, prominent marginal osteophyte. Marginal osteophyte formation in the lateral compartment without significant joint space loss.     Overall there is mild progression of degenerative changes since a comparison study.      Impression: Ayo Hernández is a 45 year old male with chronic bilateral knee pain, advanced primary osteoarthritis.      Plan:   * WB status: weightbearing per comfort.    * Rest  * Activity modification - avoid activities that aggravate symptoms.  * NSAIDS -  regular use for inflammation, with food, as long as no contra-indications. Please discuss with pcp if needed.  * Ice twice daily to three times daily as needed.  * Compression wrap as needed.  * Elevation of extremity to reduce swelling as needed.  * Injections: risks and perceived benefits discussed today. Patient elects to proceed bilaterally. Will use kenalog again today.  * Tylenol as needed for pain  * return to clinic as needed    Ayo to follow up with Primary Care provider regarding elevated blood pressure.      Osvaldo Mccoy M.D., M.S.  Dept. of Orthopaedic Surgery  [facility]    Large Joint Injection/Arthocentesis: bilateral knee    Date/Time: 7/15/2020 12:00 PM  Performed by: Van Napoles PA  Authorized by: Osvaldo Mccoy MD     Indications:  Pain and osteoarthritis  Needle Size:  22 G  Guidance: landmark guided    Approach:  Anteromedial  Location:  Knee  Laterality:  Bilateral      Medications (Right):  4 mL bupivacaine 0.25 %; 80 mg triamcinolone 40 MG/ML  Medications (Left):  4 mL bupivacaine 0.25 %; 80 mg triamcinolone 40 MG/ML  Outcome:  Tolerated well, no immediate complications  Procedure discussed: discussed risks, benefits, and alternatives    Consent Given by:  Patient  Prep: patient was prepped and draped in usual sterile fashion            Again, thank you for allowing me to participate in the care of your patient.        Sincerely,        Osvaldo Mccoy MD

## 2020-09-28 DIAGNOSIS — I10 BENIGN ESSENTIAL HYPERTENSION: ICD-10-CM

## 2020-09-28 RX ORDER — AMLODIPINE BESYLATE 10 MG/1
TABLET ORAL
Qty: 90 TABLET | Refills: 1 | Status: SHIPPED | OUTPATIENT
Start: 2020-09-28 | End: 2021-03-24

## 2020-09-28 NOTE — TELEPHONE ENCOUNTER
Routing refill request to provider for review/approval because:    Patient failed:  Blood pressure under 140/90 in past 12 months  BP Readings from Last 3 Encounters:   07/15/20 (!) 157/100   06/09/20 116/76   03/23/20 131/83     Tracy Fox BSN, RN

## 2020-10-19 ENCOUNTER — OFFICE VISIT (OUTPATIENT)
Dept: ORTHOPEDICS | Facility: CLINIC | Age: 45
End: 2020-10-19
Payer: COMMERCIAL

## 2020-10-19 VITALS
BODY MASS INDEX: 32.66 KG/M2 | DIASTOLIC BLOOD PRESSURE: 94 MMHG | HEIGHT: 73 IN | WEIGHT: 246.4 LBS | SYSTOLIC BLOOD PRESSURE: 157 MMHG | HEART RATE: 66 BPM

## 2020-10-19 DIAGNOSIS — M17.0 PRIMARY OSTEOARTHRITIS OF BOTH KNEES: Primary | ICD-10-CM

## 2020-10-19 PROCEDURE — 20610 DRAIN/INJ JOINT/BURSA W/O US: CPT | Mod: 50 | Performed by: ORTHOPAEDIC SURGERY

## 2020-10-19 RX ORDER — TRIAMCINOLONE ACETONIDE 40 MG/ML
80 INJECTION, SUSPENSION INTRA-ARTICULAR; INTRAMUSCULAR
Status: DISCONTINUED | OUTPATIENT
Start: 2020-10-19 | End: 2021-02-01

## 2020-10-19 RX ORDER — BUPIVACAINE HYDROCHLORIDE 2.5 MG/ML
4 INJECTION, SOLUTION INFILTRATION; PERINEURAL
Status: DISCONTINUED | OUTPATIENT
Start: 2020-10-19 | End: 2021-02-01

## 2020-10-19 RX ADMIN — BUPIVACAINE HYDROCHLORIDE 4 ML: 2.5 INJECTION, SOLUTION INFILTRATION; PERINEURAL at 16:15

## 2020-10-19 RX ADMIN — TRIAMCINOLONE ACETONIDE 80 MG: 40 INJECTION, SUSPENSION INTRA-ARTICULAR; INTRAMUSCULAR at 16:15

## 2020-10-19 ASSESSMENT — PAIN SCALES - GENERAL: PAINLEVEL: SEVERE PAIN (7)

## 2020-10-19 ASSESSMENT — MIFFLIN-ST. JEOR: SCORE: 2046.54

## 2020-10-19 NOTE — LETTER
10/19/2020         RE: Ayo Hernández  [address]  [facility] [address]        Dear Colleague,    Thank you for referring your patient, Ayo Hernández, to the [facility] [place]. Please see a copy of my visit note below.    Chief Complaint   Patient presents with     Knee Pain     Bilateral knee pain/OA. Last injections: kenalog on 7/15/20. Patient notes the injections weren't bad, lasting about 3-4 months. He would like more injections today.        HISTORY OF PRESENT ILLNESS:  Ayo Hernández is a 45 year old male seen for chronic bilateral knee pain, advanced primary osteoarthritis.  He returns today with pain, right more than left. Denies any swelling.He will have stiffness with standing. Also has pain with getting up after prolonged sitting. His last injections were on 7/15/2020 with kenalog. These worked well. Pain 7/10. Would like repeat injections.     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      No past medical history on file.    Past Surgical History:   Procedure Laterality Date     ARTHROSCOPY KNEE Right 7/15/2016    Procedure: ARTHROSCOPY KNEE;  Surgeon: Osvaldo Mccoy MD;  Location:  OR     ORTHOPEDIC SURGERY  08.2012    left knee, arthroscopy     ORTHOPEDIC SURGERY  07.15.2016    right knee       Medications:   Current Outpatient Medications:      amLODIPine (NORVASC) 10 MG tablet, TAKE 1 TABLET BY MOUTH ONCE DAILY, Disp: 90 tablet, Rfl: 1     ibuprofen (ADVIL,MOTRIN) 200 MG tablet, Take 200 mg by mouth every 4 hours as needed., Disp: , Rfl:     Current Facility-Administered Medications:      bupivacaine (MARCAINE) 0.25 % injection 4 mL, 4 mL, , , Osvaldo Mccoy MD, 4 mL at 07/15/20 1200     triamcinolone (KENALOG-40) injection 80 mg, 80 mg, , , Osvaldo Mccoy MD, 80 mg at 07/15/20 1200     triamcinolone (KENALOG-40) injection 80 mg, 80 mg, , , Nadine  "Osvaldo Rene MD, 80 mg at 07/15/20 1200    Allergies: No Known Allergies       REVIEW OF SYSTEMS:   CONSTITUTIONAL:NEGATIVE for fever, chills, night sweats  INTEGUMENTARY/SKIN: NEGATIVE for worrisome wound problems or redness.  MUSCULOSKELETAL:See HPI above  NEURO: NEGATIVE for weakness, dizziness or paresthesias      PHYSICAL EXAM:  BP (!) 157/94   Pulse 66   Ht 1.854 m (6' 1\")   Wt 111.8 kg (246 lb 6.4 oz)   BMI 32.51 kg/m     GENERAL APPEARANCE: healthy, alert, no distress  SKIN: no suspicious lesions or rashes  NEURO: Normal strength and tone, mentation intact and speech normal  PSYCH:  mentation appears normal and affect normal, not anxious  RESPIRATORY: No increased work of breathing.    Gait: normal  Alignment: varus    No Quad atrophy, strength normal.  Intact sensation deep peroneal nerve, superficial peroneal nerve, med/lat tibial nerve, sural nerve, saphenous nerve  Intact EHL, EDL, TA, FHL, GS, quadriceps hamstrings and hip flexors  Toes warm and well perfused, brisk capillary refill. Palpable 2+ dp pulses.  calf soft and nttp or squeeze.  Edema: none    right  KNEE EXAM:    Skin: intact, no ecchymosis.  ROM: 0 degrees extension to 110 degrees flexion  Effusion:trace  Tender: medial joint line.   Positive crepitus.  Palpable marginal osteophytes    Left knee exam:  Skin intact. No erythema or ecchymosis.  ROM: 3 extension to 100 flexion  Effusion: trace  Tender to palpation medial joint line, lateral joint line  Positive medial and patello-femoral crepitus.  Palpable marginal osteophytes.      X-RAY: no new images today.    3 views bilateral knees taken on 2/5/2018 were reviewed in clinic today.    Left: Advanced lateral compartment degenerative changes similar to the prior study, bone on bone with articular flattening, subchondral sclerosis. Moderate patellofemoral degenerative changes with large marginal osteophytes.     Right: Moderate patellofemoral degenerative changes, prominent marginal " osteophyte. Marginal osteophyte formation in the lateral compartment without significant joint space loss.         Impression: Ayo Hernández is a 45 year old male with chronic bilateral knee pain, advanced primary osteoarthritis.      Plan:   * WB status: weightbearing per comfort.    * Rest  * Activity modification - avoid activities that aggravate symptoms.  * NSAIDS - regular use for inflammation, with food, as long as no contra-indications. Please discuss with pcp if needed.  * Ice twice daily to three times daily as needed.  * Compression wrap as needed.  * Elevation of extremity to reduce swelling as needed.  * Injections: risks and perceived benefits discussed today. Patient elects to proceed bilaterally. Will use kenalog again today.  * Tylenol as needed for pain  * return to clinic as needed    Ayo to follow up with Primary Care provider regarding elevated blood pressure.      Osvaldo Mccoy M.D., M.S.  Dept. of Orthopaedic Surgery  [facility]    Large Joint Injection/Arthocentesis: bilateral knee    Date/Time: 10/19/2020 4:15 PM  Performed by: Osvaldo Mccoy MD  Authorized by: Osvaldo Mccoy MD     Indications:  Pain  Needle Size:  22 G  Guidance: landmark guided    Approach:  Anteromedial  Location:  Knee  Laterality:  Bilateral      Medications (Right):  80 mg triamcinolone 40 MG/ML; 4 mL bupivacaine 0.25 %  Medications (Left):  80 mg triamcinolone 40 MG/ML; 4 mL bupivacaine 0.25 %  Outcome:  Tolerated well, no immediate complications  Procedure discussed: discussed risks, benefits, and alternatives    Consent Given by:  Patient  Timeout: timeout called immediately prior to procedure    Prep: patient was prepped and draped in usual sterile fashion              Again, thank you for allowing me to participate in the care of your patient.        Sincerely,        Osvaldo Mccoy MD

## 2020-10-19 NOTE — PROGRESS NOTES
Chief Complaint   Patient presents with     Knee Pain     Bilateral knee pain/OA. Last injections: kenalog on 7/15/20. Patient notes the injections weren't bad, lasting about 3-4 months. He would like more injections today.        HISTORY OF PRESENT ILLNESS:  Ayo Hernández is a 45 year old male seen for chronic bilateral knee pain, advanced primary osteoarthritis.  He returns today with pain, right more than left. Denies any swelling.He will have stiffness with standing. Also has pain with getting up after prolonged sitting. His last injections were on 7/15/2020 with kenalog. These worked well. Pain 7/10. Would like repeat injections.     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      No past medical history on file.    Past Surgical History:   Procedure Laterality Date     ARTHROSCOPY KNEE Right 7/15/2016    Procedure: ARTHROSCOPY KNEE;  Surgeon: Osvaldo Mccoy MD;  Location:  OR     ORTHOPEDIC SURGERY  08.2012    left knee, arthroscopy     ORTHOPEDIC SURGERY  07.15.2016    right knee       Medications:   Current Outpatient Medications:      amLODIPine (NORVASC) 10 MG tablet, TAKE 1 TABLET BY MOUTH ONCE DAILY, Disp: 90 tablet, Rfl: 1     ibuprofen (ADVIL,MOTRIN) 200 MG tablet, Take 200 mg by mouth every 4 hours as needed., Disp: , Rfl:     Current Facility-Administered Medications:      bupivacaine (MARCAINE) 0.25 % injection 4 mL, 4 mL, , , Osvaldo Mccoy MD, 4 mL at 07/15/20 1200     triamcinolone (KENALOG-40) injection 80 mg, 80 mg, , , Osvaldo Mccoy MD, 80 mg at 07/15/20 1200     triamcinolone (KENALOG-40) injection 80 mg, 80 mg, , , Osvaldo Mccoy MD, 80 mg at 07/15/20 1200    Allergies: No Known Allergies       REVIEW OF SYSTEMS:   CONSTITUTIONAL:NEGATIVE for fever, chills, night sweats  INTEGUMENTARY/SKIN: NEGATIVE for worrisome wound problems or redness.  MUSCULOSKELETAL:See HPI  "above  NEURO: NEGATIVE for weakness, dizziness or paresthesias      PHYSICAL EXAM:  BP (!) 157/94   Pulse 66   Ht 1.854 m (6' 1\")   Wt 111.8 kg (246 lb 6.4 oz)   BMI 32.51 kg/m     GENERAL APPEARANCE: healthy, alert, no distress  SKIN: no suspicious lesions or rashes  NEURO: Normal strength and tone, mentation intact and speech normal  PSYCH:  mentation appears normal and affect normal, not anxious  RESPIRATORY: No increased work of breathing.    Gait: normal  Alignment: varus    No Quad atrophy, strength normal.  Intact sensation deep peroneal nerve, superficial peroneal nerve, med/lat tibial nerve, sural nerve, saphenous nerve  Intact EHL, EDL, TA, FHL, GS, quadriceps hamstrings and hip flexors  Toes warm and well perfused, brisk capillary refill. Palpable 2+ dp pulses.  calf soft and nttp or squeeze.  Edema: none    right  KNEE EXAM:    Skin: intact, no ecchymosis.  ROM: 0 degrees extension to 110 degrees flexion  Effusion:trace  Tender: medial joint line.   Positive crepitus.  Palpable marginal osteophytes    Left knee exam:  Skin intact. No erythema or ecchymosis.  ROM: 3 extension to 100 flexion  Effusion: trace  Tender to palpation medial joint line, lateral joint line  Positive medial and patello-femoral crepitus.  Palpable marginal osteophytes.      X-RAY: no new images today.    3 views bilateral knees taken on 2/5/2018 were reviewed in clinic today.    Left: Advanced lateral compartment degenerative changes similar to the prior study, bone on bone with articular flattening, subchondral sclerosis. Moderate patellofemoral degenerative changes with large marginal osteophytes.     Right: Moderate patellofemoral degenerative changes, prominent marginal osteophyte. Marginal osteophyte formation in the lateral compartment without significant joint space loss.         Impression: Ayo Hernández is a 45 year old male with chronic bilateral knee pain, advanced primary osteoarthritis.      Plan:   * WB status: " weightbearing per comfort.    * Rest  * Activity modification - avoid activities that aggravate symptoms.  * NSAIDS - regular use for inflammation, with food, as long as no contra-indications. Please discuss with pcp if needed.  * Ice twice daily to three times daily as needed.  * Compression wrap as needed.  * Elevation of extremity to reduce swelling as needed.  * Injections: risks and perceived benefits discussed today. Patient elects to proceed bilaterally. Will use kenalog again today.  * Tylenol as needed for pain  * return to clinic as needed    Ayo to follow up with Primary Care provider regarding elevated blood pressure.      Osvaldo Mccoy M.D., M.S.  Dept. of Orthopaedic Surgery  [facility]    Large Joint Injection/Arthocentesis: bilateral knee    Date/Time: 10/19/2020 4:15 PM  Performed by: Osvaldo Mccoy MD  Authorized by: Osvaldo Mccoy MD     Indications:  Pain  Needle Size:  22 G  Guidance: landmark guided    Approach:  Anteromedial  Location:  Knee  Laterality:  Bilateral      Medications (Right):  80 mg triamcinolone 40 MG/ML; 4 mL bupivacaine 0.25 %  Medications (Left):  80 mg triamcinolone 40 MG/ML; 4 mL bupivacaine 0.25 %  Outcome:  Tolerated well, no immediate complications  Procedure discussed: discussed risks, benefits, and alternatives    Consent Given by:  Patient  Timeout: timeout called immediately prior to procedure    Prep: patient was prepped and draped in usual sterile fashion

## 2020-12-13 ENCOUNTER — HEALTH MAINTENANCE LETTER (OUTPATIENT)
Age: 45
End: 2020-12-13

## 2021-02-01 ENCOUNTER — OFFICE VISIT (OUTPATIENT)
Dept: ORTHOPEDICS | Facility: CLINIC | Age: 46
End: 2021-02-01
Payer: COMMERCIAL

## 2021-02-01 VITALS
WEIGHT: 219 LBS | DIASTOLIC BLOOD PRESSURE: 78 MMHG | HEIGHT: 73 IN | HEART RATE: 61 BPM | BODY MASS INDEX: 29.03 KG/M2 | SYSTOLIC BLOOD PRESSURE: 148 MMHG

## 2021-02-01 DIAGNOSIS — M17.0 PRIMARY OSTEOARTHRITIS OF BOTH KNEES: Primary | ICD-10-CM

## 2021-02-01 PROCEDURE — 20610 DRAIN/INJ JOINT/BURSA W/O US: CPT | Mod: 50 | Performed by: PHYSICIAN ASSISTANT

## 2021-02-01 RX ORDER — TRIAMCINOLONE ACETONIDE 40 MG/ML
80 INJECTION, SUSPENSION INTRA-ARTICULAR; INTRAMUSCULAR
Status: DISCONTINUED | OUTPATIENT
Start: 2021-02-01 | End: 2021-05-03

## 2021-02-01 RX ORDER — BUPIVACAINE HYDROCHLORIDE 2.5 MG/ML
4 INJECTION, SOLUTION INFILTRATION; PERINEURAL
Status: DISCONTINUED | OUTPATIENT
Start: 2021-02-01 | End: 2021-05-03

## 2021-02-01 RX ADMIN — TRIAMCINOLONE ACETONIDE 80 MG: 40 INJECTION, SUSPENSION INTRA-ARTICULAR; INTRAMUSCULAR at 16:08

## 2021-02-01 RX ADMIN — BUPIVACAINE HYDROCHLORIDE 4 ML: 2.5 INJECTION, SOLUTION INFILTRATION; PERINEURAL at 16:08

## 2021-02-01 ASSESSMENT — MIFFLIN-ST. JEOR: SCORE: 1922.26

## 2021-02-01 ASSESSMENT — PAIN SCALES - GENERAL: PAINLEVEL: MODERATE PAIN (4)

## 2021-02-01 NOTE — PROGRESS NOTES
"Chief Complaint   Patient presents with     Knee Pain     Bilateral knee pain. Last injections: kenalog 10/19/20. Patient would like to have more injections. Right knee is worse.       HISTORY OF PRESENT ILLNESS:  Ayo Hernández is a 45 year old male seen for chronic bilateral knee pain, advanced primary osteoarthritis.  He returns today with pain, right more than left. Denies any swelling.He will have stiffness with standing. Also has pain with getting up after prolonged sitting. His last injections were on 10/29/2020 with kenalog. These worked well. Pain 4/10. Would like repeat injections.     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    He's lost about 20 pounds since last visit.       No past medical history on file.    Past Surgical History:   Procedure Laterality Date     ARTHROSCOPY KNEE Right 7/15/2016    Procedure: ARTHROSCOPY KNEE;  Surgeon: Osvaldo Mccoy MD;  Location:  OR     ORTHOPEDIC SURGERY  08.2012    left knee, arthroscopy     ORTHOPEDIC SURGERY  07.15.2016    right knee       Medications:   Current Outpatient Medications:      amLODIPine (NORVASC) 10 MG tablet, TAKE 1 TABLET BY MOUTH ONCE DAILY, Disp: 90 tablet, Rfl: 1     ibuprofen (ADVIL,MOTRIN) 200 MG tablet, Take 200 mg by mouth every 4 hours as needed., Disp: , Rfl:     Allergies: No Known Allergies       REVIEW OF SYSTEMS:   CONSTITUTIONAL:NEGATIVE for fever, chills, night sweats  INTEGUMENTARY/SKIN: NEGATIVE for worrisome wound problems or redness.  MUSCULOSKELETAL:See HPI above  NEURO: NEGATIVE for weakness, dizziness or paresthesias      PHYSICAL EXAM:  BP (!) 148/78   Pulse 61   Ht 1.854 m (6' 1\")   Wt 99.3 kg (219 lb)   BMI 28.89 kg/m     GENERAL APPEARANCE: healthy, alert, no distress  SKIN: no suspicious lesions or rashes  NEURO: Normal strength and tone, mentation intact and speech normal  PSYCH:  mentation appears normal " and affect normal, not anxious  RESPIRATORY: No increased work of breathing.    Gait: normal  Alignment: varus    No Quad atrophy, strength normal.  Intact sensation deep peroneal nerve, superficial peroneal nerve, med/lat tibial nerve, sural nerve, saphenous nerve  Intact EHL, EDL, TA, FHL, GS, quadriceps hamstrings and hip flexors  Toes warm and well perfused, brisk capillary refill. Palpable 2+ dp pulses.  calf soft and nttp or squeeze.  Edema: none    right  KNEE EXAM:    Skin: intact, no ecchymosis.  ROM: 0 degrees extension to 110 degrees flexion  Effusion:trace  Tender: medial joint line.   Positive crepitus.  Palpable marginal osteophytes    Left knee exam:  Skin intact. No erythema or ecchymosis.  ROM: 3 extension to 100 flexion  Effusion: trace  Tender to palpation medial joint line, lateral joint line  Positive medial and patello-femoral crepitus.  Palpable marginal osteophytes.      X-RAY: no new images today.    3 views bilateral knees taken on 2/5/2018 were reviewed in clinic today.    Left: Advanced lateral compartment degenerative changes similar to the prior study, bone on bone with articular flattening, subchondral sclerosis. Moderate patellofemoral degenerative changes with large marginal osteophytes.     Right: Moderate patellofemoral degenerative changes, prominent marginal osteophyte. Marginal osteophyte formation in the lateral compartment without significant joint space loss.         Impression: Ayo Hernández is a 45 year old male with chronic bilateral knee pain, advanced primary osteoarthritis.      Plan:   * WB status: weightbearing per comfort.    * Rest  * Activity modification - avoid activities that aggravate symptoms.  * NSAIDS - regular use for inflammation, with food, as long as no contra-indications. Please discuss with primary care provider  if needed.  * Ice twice daily to three times daily as needed.  * Compression wrap as needed.  * Elevation of extremity to reduce swelling as  needed.  * Injections: risks and perceived benefits discussed today. Patient elects to proceed bilaterally. Will use kenalog again today.  * Tylenol as needed for pain  * return to clinic as needed    Ayo to follow up with Primary Care provider regarding elevated blood pressure.      Van Napoles PA-C  Dept. of Orthopaedic Surgery  [facility]    Large Joint Injection/Arthocentesis: bilateral knee    Date/Time: 2/1/2021 4:08 PM  Performed by: Van Napoles PA  Authorized by: Osvaldo Mccoy MD     Indications:  Pain and osteoarthritis  Needle Size:  22 G  Guidance: landmark guided    Approach:  Anteromedial  Location:  Knee  Laterality:  Bilateral      Medications (Right):  4 mL bupivacaine 0.25 %; 80 mg triamcinolone 40 MG/ML  Medications (Left):  4 mL bupivacaine 0.25 %; 80 mg triamcinolone 40 MG/ML  Outcome:  Tolerated well, no immediate complications  Procedure discussed: discussed risks, benefits, and alternatives    Consent Given by:  Patient  Prep: patient was prepped and draped in usual sterile fashion

## 2021-02-01 NOTE — LETTER
2/1/2021         RE: Ayo Hernández  [address]  [facility] [address]        Dear Colleague,    Thank you for referring your patient, Ayo Hernández, to the [facility] [place]. Please see a copy of my visit note below.    Chief Complaint   Patient presents with     Knee Pain     Bilateral knee pain. Last injections: kenalog 10/19/20. Patient would like to have more injections. Right knee is worse.       HISTORY OF PRESENT ILLNESS:  Ayo Hernández is a 45 year old male seen for chronic bilateral knee pain, advanced primary osteoarthritis.  He returns today with pain, right more than left. Denies any swelling.He will have stiffness with standing. Also has pain with getting up after prolonged sitting. His last injections were on 10/29/2020 with kenalog. These worked well. Pain 4/10. Would like repeat injections.     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    He's lost about 20 pounds since last visit.       No past medical history on file.    Past Surgical History:   Procedure Laterality Date     ARTHROSCOPY KNEE Right 7/15/2016    Procedure: ARTHROSCOPY KNEE;  Surgeon: Osvaldo Mccoy MD;  Location:  OR     ORTHOPEDIC SURGERY  08.2012    left knee, arthroscopy     ORTHOPEDIC SURGERY  07.15.2016    right knee       Medications:   Current Outpatient Medications:      amLODIPine (NORVASC) 10 MG tablet, TAKE 1 TABLET BY MOUTH ONCE DAILY, Disp: 90 tablet, Rfl: 1     ibuprofen (ADVIL,MOTRIN) 200 MG tablet, Take 200 mg by mouth every 4 hours as needed., Disp: , Rfl:     Allergies: No Known Allergies       REVIEW OF SYSTEMS:   CONSTITUTIONAL:NEGATIVE for fever, chills, night sweats  INTEGUMENTARY/SKIN: NEGATIVE for worrisome wound problems or redness.  MUSCULOSKELETAL:See HPI above  NEURO: NEGATIVE for weakness, dizziness or paresthesias      PHYSICAL EXAM:  BP (!) 148/78   Pulse 61   " Ht 1.854 m (6' 1\")   Wt 99.3 kg (219 lb)   BMI 28.89 kg/m     GENERAL APPEARANCE: healthy, alert, no distress  SKIN: no suspicious lesions or rashes  NEURO: Normal strength and tone, mentation intact and speech normal  PSYCH:  mentation appears normal and affect normal, not anxious  RESPIRATORY: No increased work of breathing.    Gait: normal  Alignment: varus    No Quad atrophy, strength normal.  Intact sensation deep peroneal nerve, superficial peroneal nerve, med/lat tibial nerve, sural nerve, saphenous nerve  Intact EHL, EDL, TA, FHL, GS, quadriceps hamstrings and hip flexors  Toes warm and well perfused, brisk capillary refill. Palpable 2+ dp pulses.  calf soft and nttp or squeeze.  Edema: none    right  KNEE EXAM:    Skin: intact, no ecchymosis.  ROM: 0 degrees extension to 110 degrees flexion  Effusion:trace  Tender: medial joint line.   Positive crepitus.  Palpable marginal osteophytes    Left knee exam:  Skin intact. No erythema or ecchymosis.  ROM: 3 extension to 100 flexion  Effusion: trace  Tender to palpation medial joint line, lateral joint line  Positive medial and patello-femoral crepitus.  Palpable marginal osteophytes.      X-RAY: no new images today.    3 views bilateral knees taken on 2/5/2018 were reviewed in clinic today.    Left: Advanced lateral compartment degenerative changes similar to the prior study, bone on bone with articular flattening, subchondral sclerosis. Moderate patellofemoral degenerative changes with large marginal osteophytes.     Right: Moderate patellofemoral degenerative changes, prominent marginal osteophyte. Marginal osteophyte formation in the lateral compartment without significant joint space loss.         Impression: Ayo Hernández is a 45 year old male with chronic bilateral knee pain, advanced primary osteoarthritis.      Plan:   * WB status: weightbearing per comfort.    * Rest  * Activity modification - avoid activities that aggravate symptoms.  * NSAIDS - " regular use for inflammation, with food, as long as no contra-indications. Please discuss with primary care provider  if needed.  * Ice twice daily to three times daily as needed.  * Compression wrap as needed.  * Elevation of extremity to reduce swelling as needed.  * Injections: risks and perceived benefits discussed today. Patient elects to proceed bilaterally. Will use kenalog again today.  * Tylenol as needed for pain  * return to clinic as needed    Ayo to follow up with Primary Care provider regarding elevated blood pressure.      Van Napoles PA-C  Dept. of Orthopaedic Surgery  [facility]    Large Joint Injection/Arthocentesis: bilateral knee    Date/Time: 2/1/2021 4:08 PM  Performed by: Van Napoles PA  Authorized by: Osvaldo Mccoy MD     Indications:  Pain and osteoarthritis  Needle Size:  22 G  Guidance: landmark guided    Approach:  Anteromedial  Location:  Knee  Laterality:  Bilateral      Medications (Right):  4 mL bupivacaine 0.25 %; 80 mg triamcinolone 40 MG/ML  Medications (Left):  4 mL bupivacaine 0.25 %; 80 mg triamcinolone 40 MG/ML  Outcome:  Tolerated well, no immediate complications  Procedure discussed: discussed risks, benefits, and alternatives    Consent Given by:  Patient  Prep: patient was prepped and draped in usual sterile fashion              Again, thank you for allowing me to participate in the care of your patient.        Sincerely,        Osvaldo Mccoy MD

## 2021-03-23 DIAGNOSIS — I10 BENIGN ESSENTIAL HYPERTENSION: ICD-10-CM

## 2021-03-24 RX ORDER — AMLODIPINE BESYLATE 10 MG/1
TABLET ORAL
Qty: 90 TABLET | Refills: 0 | Status: SHIPPED | OUTPATIENT
Start: 2021-03-24 | End: 2021-06-25

## 2021-03-24 NOTE — TELEPHONE ENCOUNTER
"Routing refill request to provider for review/approval because:  Failed protocol.  No future appointment scheduled. Please advise. Thank you. Ilana Sandhu R.N.    Requested Prescriptions   Pending Prescriptions Disp Refills    amLODIPine (NORVASC) 10 MG tablet [Pharmacy Med Name: AMLODIPINE 10MG TABLET] 90 tablet 1     Sig: TAKE 1 TABLET BY MOUTH ONCE DAILY       Calcium Channel Blockers Protocol  Failed - 3/23/2021  1:01 AM        Failed - Blood pressure under 140/90 in past 12 months     BP Readings from Last 3 Encounters:   02/01/21 (!) 148/78   10/19/20 (!) 157/94   07/15/20 (!) 157/100                 Passed - Recent (12 mo) or future (30 days) visit within the authorizing provider's specialty     Patient has had an office visit with the authorizing provider or a provider within the authorizing providers department within the previous 12 mos or has a future within next 30 days. See \"Patient Info\" tab in inbasket, or \"Choose Columns\" in Meds & Orders section of the refill encounter.              Passed - Medication is active on med list        Passed - Patient is age 18 or older        Passed - Normal serum creatinine on file in past 12 months     Recent Labs   Lab Test 06/09/20  0940   CR 0.87       Ok to refill medication if creatinine is low                     "

## 2021-04-11 ENCOUNTER — HEALTH MAINTENANCE LETTER (OUTPATIENT)
Age: 46
End: 2021-04-11

## 2021-05-03 ENCOUNTER — OFFICE VISIT (OUTPATIENT)
Dept: ORTHOPEDICS | Facility: CLINIC | Age: 46
End: 2021-05-03
Payer: COMMERCIAL

## 2021-05-03 VITALS
HEIGHT: 73 IN | WEIGHT: 228.6 LBS | BODY MASS INDEX: 30.3 KG/M2 | HEART RATE: 65 BPM | SYSTOLIC BLOOD PRESSURE: 131 MMHG | DIASTOLIC BLOOD PRESSURE: 77 MMHG

## 2021-05-03 DIAGNOSIS — M17.0 PRIMARY OSTEOARTHRITIS OF BOTH KNEES: Primary | ICD-10-CM

## 2021-05-03 PROCEDURE — 20610 DRAIN/INJ JOINT/BURSA W/O US: CPT | Mod: 50 | Performed by: ORTHOPAEDIC SURGERY

## 2021-05-03 RX ORDER — BUPIVACAINE HYDROCHLORIDE 2.5 MG/ML
4 INJECTION, SOLUTION INFILTRATION; PERINEURAL
Status: DISCONTINUED | OUTPATIENT
Start: 2021-05-03 | End: 2021-07-07

## 2021-05-03 RX ORDER — METHYLPREDNISOLONE ACETATE 80 MG/ML
80 INJECTION, SUSPENSION INTRA-ARTICULAR; INTRALESIONAL; INTRAMUSCULAR; SOFT TISSUE
Status: DISCONTINUED | OUTPATIENT
Start: 2021-05-03 | End: 2021-07-07

## 2021-05-03 RX ADMIN — METHYLPREDNISOLONE ACETATE 80 MG: 80 INJECTION, SUSPENSION INTRA-ARTICULAR; INTRALESIONAL; INTRAMUSCULAR; SOFT TISSUE at 16:39

## 2021-05-03 RX ADMIN — BUPIVACAINE HYDROCHLORIDE 4 ML: 2.5 INJECTION, SOLUTION INFILTRATION; PERINEURAL at 16:39

## 2021-05-03 ASSESSMENT — PAIN SCALES - GENERAL: PAINLEVEL: SEVERE PAIN (6)

## 2021-05-03 ASSESSMENT — MIFFLIN-ST. JEOR: SCORE: 1965.8

## 2021-05-03 NOTE — LETTER
5/3/2021         RE: Ayo Hernández  [address]  [facility] [address]        Dear Colleague,    Thank you for referring your patient, Ayo Hernández, to the [facility] [place]. Please see a copy of my visit note below.    Chief Complaint   Patient presents with     Knee Pain     Bilateral knee pain. Last injections with Kenalo21. Injections work well. He would like to have more injections today.       HISTORY OF PRESENT ILLNESS:  Ayo Hernández is a 45 year old male seen for chronic bilateral knee pain, advanced primary osteoarthritis.  He returns today with pain, right more than left. Not sure what happened but a couple weeks ago worsened right knee pain and swelling. not sure if he twisted it working out or not. Improving. He will have stiffness with standing. Also has pain with getting up after prolonged sitting. His last injections were on 2021 with kenalog. These worked well. Pain 4/10. Would like repeat injections.     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      No past medical history on file.    Past Surgical History:   Procedure Laterality Date     ARTHROSCOPY KNEE Right 7/15/2016    Procedure: ARTHROSCOPY KNEE;  Surgeon: Osvaldo Mccoy MD;  Location:  OR     ORTHOPEDIC SURGERY      left knee, arthroscopy     ORTHOPEDIC SURGERY  07.15.2016    right knee       Medications:   Current Outpatient Medications:      amLODIPine (NORVASC) 10 MG tablet, TAKE 1 TABLET BY MOUTH ONCE DAILY, Disp: 90 tablet, Rfl: 0     ibuprofen (ADVIL,MOTRIN) 200 MG tablet, Take 200 mg by mouth every 4 hours as needed., Disp: , Rfl:     Current Facility-Administered Medications:      bupivacaine (MARCAINE) 0.25 % injection 4 mL, 4 mL, , , Osvaldo Mccoy MD, 4 mL at 21 1608     bupivacaine (MARCAINE) 0.25 % injection 4 mL, 4 mL, , , Osvaldo Mccoy MD, 4 mL at  "02/01/21 1608     triamcinolone (KENALOG-40) injection 80 mg, 80 mg, , , sOvaldo Mccoy MD, 80 mg at 02/01/21 1608     triamcinolone (KENALOG-40) injection 80 mg, 80 mg, , , Osvaldo Mccoy MD, 80 mg at 02/01/21 1608    Allergies: No Known Allergies       REVIEW OF SYSTEMS:   CONSTITUTIONAL:NEGATIVE for fever, chills, night sweats  INTEGUMENTARY/SKIN: NEGATIVE for worrisome wound problems or redness.  MUSCULOSKELETAL:See HPI above  NEURO: NEGATIVE for weakness, dizziness or paresthesias      PHYSICAL EXAM:  /77   Pulse 65   Ht 1.854 m (6' 1\")   Wt 103.7 kg (228 lb 9.6 oz)   BMI 30.16 kg/m     GENERAL APPEARANCE: healthy, alert, no distress  SKIN: no suspicious lesions or rashes  NEURO: Normal strength and tone, mentation intact and speech normal  PSYCH:  mentation appears normal and affect normal, not anxious  RESPIRATORY: No increased work of breathing.    Gait: slight favors the right.  Alignment: varus    No Quad atrophy, strength normal.  Intact sensation deep peroneal nerve, superficial peroneal nerve, med/lat tibial nerve, sural nerve, saphenous nerve  Intact EHL, EDL, TA, FHL, GS, quadriceps hamstrings and hip flexors  Toes warm and well perfused, brisk capillary refill. Palpable 2+ dp pulses.  calf soft and nttp or squeeze.  Edema: none    right  KNEE EXAM:    Skin: intact, no ecchymosis.  ROM: 0 degrees extension to 110 degrees flexion  Effusion:trace  Tender: medial joint line.   Positive crepitus.  Palpable marginal osteophytes    Left knee exam:  Skin intact. No erythema or ecchymosis.  ROM: 3 extension to 100 flexion  Effusion: trace  Tender to palpation medial joint line, lateral joint line  Positive medial and patello-femoral crepitus.  Palpable marginal osteophytes.      X-RAY: no new images today.    3 views bilateral knees taken on 2/5/2018    Left: Advanced lateral compartment degenerative changes similar to the prior study, bone on bone with articular flattening, subchondral " sclerosis. Moderate patellofemoral degenerative changes with large marginal osteophytes.     Right: Moderate patellofemoral degenerative changes, prominent marginal osteophyte. Marginal osteophyte formation in the lateral compartment without significant joint space loss.         Impression: Ayo Hernández is a 45 year old male with chronic bilateral knee pain, advanced primary osteoarthritis.      Plan:   * WB status: weightbearing per comfort.    * Rest  * Activity modification - avoid activities that aggravate symptoms.  * NSAIDS - regular use for inflammation, with food, as long as no contra-indications. Please discuss with primary care provider  if needed.  * Ice twice daily to three times daily as needed.  * Compression wrap as needed.  * Elevation of extremity to reduce swelling as needed.  * Injections: risks and perceived benefits discussed today. Patient elects to proceed bilaterally. Will use kenalog again today.  * Tylenol as needed for pain  * return to clinic as needed    Ayo to follow up with Primary Care provider regarding elevated blood pressure.      Osvaldo Mccoy MD  Dept. of Orthopaedic Surgery  [facility]    Large Joint Injection/Arthocentesis: bilateral knee    Date/Time: 5/3/2021 4:39 PM  Performed by: Van Napoles PA  Authorized by: Osvaldo Mccoy MD     Indications:  Pain and osteoarthritis  Needle Size:  22 G  Guidance: landmark guided    Approach:  Anteromedial  Location:  Knee  Laterality:  Bilateral      Medications (Right):  4 mL bupivacaine 0.25 %; 80 mg methylPREDNISolone 80 MG/ML  Medications (Left):  80 mg methylPREDNISolone 80 MG/ML; 4 mL bupivacaine 0.25 %  Outcome:  Tolerated well, no immediate complications  Procedure discussed: discussed risks, benefits, and alternatives    Consent Given by:  Patient  Prep: patient was prepped and draped in usual sterile fashion              Again, thank you for allowing me to participate in the care of your patient.         Sincerely,        Osvaldo Mccoy MD

## 2021-06-25 DIAGNOSIS — I10 BENIGN ESSENTIAL HYPERTENSION: ICD-10-CM

## 2021-06-25 RX ORDER — AMLODIPINE BESYLATE 10 MG/1
TABLET ORAL
Qty: 30 TABLET | Refills: 0 | Status: SHIPPED | OUTPATIENT
Start: 2021-06-25 | End: 2021-07-09

## 2021-06-25 NOTE — LETTER
June 25, 2021    Ayo Hernández  [address]  [facility] [address]    Dear Ayo,       We recently received a refill request for amLODIPine (NORVASC) 10 MG tablet.  We have refilled this for a one time 30 day supply only because you are due for a:    Physical office visit and fasting lab appointment      Please schedule this lab appointment 4-5 days prior to the office visit.     Please call at your earliest convenience so that there will not be a delay with your future refills.          Thank you,   Your [facility] Team/sp  [phone]

## 2021-06-25 NOTE — TELEPHONE ENCOUNTER
"Requested Prescriptions   Pending Prescriptions Disp Refills    amLODIPine (NORVASC) 10 MG tablet [Pharmacy Med Name: AMLODIPINE 10MG TABLET] 90 tablet 0     Sig: TAKE 1 TABLET BY MOUTH ONCE DAILY       Calcium Channel Blockers Protocol  Failed - 6/25/2021  1:00 AM        Failed - Recent (12 mo) or future (30 days) visit within the authorizing provider's specialty     Patient has had an office visit with the authorizing provider or a provider within the authorizing providers department within the previous 12 mos or has a future within next 30 days. See \"Patient Info\" tab in inbasket, or \"Choose Columns\" in Meds & Orders section of the refill encounter.              Failed - Normal serum creatinine on file in past 12 months     Recent Labs   Lab Test 06/09/20  0940   CR 0.87       Ok to refill medication if creatinine is low          Passed - Blood pressure under 140/90 in past 12 months     BP Readings from Last 3 Encounters:   05/03/21 131/77   02/01/21 (!) 148/78   10/19/20 (!) 157/94                 Passed - Medication is active on med list        Passed - Patient is age 18 or older             "

## 2021-07-07 ENCOUNTER — OFFICE VISIT (OUTPATIENT)
Dept: ORTHOPEDICS | Facility: CLINIC | Age: 46
End: 2021-07-07
Payer: COMMERCIAL

## 2021-07-07 VITALS
HEIGHT: 73 IN | DIASTOLIC BLOOD PRESSURE: 93 MMHG | WEIGHT: 218 LBS | BODY MASS INDEX: 28.89 KG/M2 | HEART RATE: 72 BPM | SYSTOLIC BLOOD PRESSURE: 163 MMHG

## 2021-07-07 DIAGNOSIS — M25.461 KNEE EFFUSION, RIGHT: ICD-10-CM

## 2021-07-07 DIAGNOSIS — M17.11 PRIMARY OSTEOARTHRITIS OF RIGHT KNEE: Primary | ICD-10-CM

## 2021-07-07 PROCEDURE — 20610 DRAIN/INJ JOINT/BURSA W/O US: CPT | Mod: RT | Performed by: ORTHOPAEDIC SURGERY

## 2021-07-07 RX ORDER — BUPIVACAINE HYDROCHLORIDE 2.5 MG/ML
4 INJECTION, SOLUTION INFILTRATION; PERINEURAL
Status: DISCONTINUED | OUTPATIENT
Start: 2021-07-07 | End: 2021-10-04

## 2021-07-07 RX ORDER — METHYLPREDNISOLONE ACETATE 80 MG/ML
80 INJECTION, SUSPENSION INTRA-ARTICULAR; INTRALESIONAL; INTRAMUSCULAR; SOFT TISSUE
Status: DISCONTINUED | OUTPATIENT
Start: 2021-07-07 | End: 2021-10-04

## 2021-07-07 RX ADMIN — BUPIVACAINE HYDROCHLORIDE 4 ML: 2.5 INJECTION, SOLUTION INFILTRATION; PERINEURAL at 14:17

## 2021-07-07 RX ADMIN — METHYLPREDNISOLONE ACETATE 80 MG: 80 INJECTION, SUSPENSION INTRA-ARTICULAR; INTRALESIONAL; INTRAMUSCULAR; SOFT TISSUE at 14:17

## 2021-07-07 ASSESSMENT — PAIN SCALES - GENERAL: PAINLEVEL: EXTREME PAIN (8)

## 2021-07-07 ASSESSMENT — MIFFLIN-ST. JEOR: SCORE: 1917.72

## 2021-07-07 NOTE — LETTER
"    7/7/2021         RE: Ayo Hernández  [address]  [facility] [address]        Dear Colleague,    Thank you for referring your patient, Ayo Hernández, to the [facility] [place]. Please see a copy of my visit note below.    Chief Complaint   Patient presents with     Right Knee - Pain     Last injections: bilateral knee 5/3/21. Patient notes that his knee started to hurt on 7/3 and has gotten worse and worse. He is present using a cane as an aid. Pain is in the anterior knee. He has an uncomfortable pressure. NKI.        HISTORY OF PRESENT ILLNESS:  Ayo Hernández is a 45 year old male seen for chronic bilateral knee pain, advanced primary osteoarthritis.  He returns today with pain, right more than left. Started to have pain on 7/3/2021 and getting worse. Using a cane to help walk. Locates pain to the front of the knee. Feels \"pressure\" in the knee. He admits going to [facility] on Monday and had xrays and 75cc drained but not injected. Swelling came right back. 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    We last saw him 5/3/2021 and that time right knee was bother more, not sure if he twisted it working out or not, but was gradually Improving.      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      No past medical history on file.    Past Surgical History:   Procedure Laterality Date     ARTHROSCOPY KNEE Right 7/15/2016    Procedure: ARTHROSCOPY KNEE;  Surgeon: Osvaldo Mccoy MD;  Location:  OR     ORTHOPEDIC SURGERY  08.2012    left knee, arthroscopy     ORTHOPEDIC SURGERY  07.15.2016    right knee       Medications:   Current Outpatient Medications:      amLODIPine (NORVASC) 10 MG tablet, TAKE 1 TABLET BY MOUTH ONCE DAILY, Disp: 30 tablet, Rfl: 0     ibuprofen (ADVIL,MOTRIN) 200 MG tablet, Take 200 mg by mouth every 4 hours as needed., Disp: , Rfl: " "    Current Facility-Administered Medications:      bupivacaine (MARCAINE) 0.25 % injection 4 mL, 4 mL, , , Osvaldo Mccoy MD, 4 mL at 05/03/21 1639     bupivacaine (MARCAINE) 0.25 % injection 4 mL, 4 mL, , , Osvaldo Mccoy MD, 4 mL at 05/03/21 1639     methylPREDNISolone (DEPO-MEDROL) injection 80 mg, 80 mg, , , Osvaldo Mccoy MD, 80 mg at 05/03/21 1639     methylPREDNISolone (DEPO-MEDROL) injection 80 mg, 80 mg, , , Osvaldo Mccoy MD, 80 mg at 05/03/21 1639    Allergies: No Known Allergies       REVIEW OF SYSTEMS:   CONSTITUTIONAL:NEGATIVE for fever, chills, night sweats  INTEGUMENTARY/SKIN: NEGATIVE for worrisome wound problems or redness.  MUSCULOSKELETAL:See HPI above  NEURO: NEGATIVE for weakness, dizziness or paresthesias      PHYSICAL EXAM:  BP (!) 163/93   Pulse 72   Ht 1.854 m (6' 1\")   Wt 98.9 kg (218 lb)   BMI 28.76 kg/m     GENERAL APPEARANCE: healthy, alert, no distress; accompanied by his wife.  SKIN: no suspicious lesions or rashes  NEURO: Normal strength and tone, mentation intact and speech normal  PSYCH:  mentation appears normal and affect normal, not anxious  RESPIRATORY: No increased work of breathing.    Gait: favors the right using cane.  Alignment: varus    No Quad atrophy, strength normal.  Intact sensation deep peroneal nerve, superficial peroneal nerve, med/lat tibial nerve, sural nerve, saphenous nerve  Intact EHL, EDL, TA, FHL, GS, quadriceps hamstrings and hip flexors  Toes warm and well perfused, brisk capillary refill. Palpable 2+ dp pulses.  calf soft and nttp or squeeze.  Edema: none    right  KNEE EXAM:    Skin: intact, no ecchymosis.  ROM: 4 degrees extension to 90 degrees flexion; limited by discomfort.  Effusion: moderate   Tender: medial joint line.   Positive crepitus.  Palpable marginal osteophytes    Left knee exam:  Skin intact. No erythema or ecchymosis.  ROM: 3 extension to 100 flexion  Effusion: trace  Tender to palpation medial joint line, lateral " joint line  Positive medial and patello-femoral crepitus.  Palpable marginal osteophytes.      X-RAY: no new images today.    3 views bilateral knees taken on 2/5/2018    Left: Advanced lateral compartment degenerative changes similar to the prior study, bone on bone with articular flattening, subchondral sclerosis. Moderate patellofemoral degenerative changes with large marginal osteophytes.     Right: Moderate patellofemoral degenerative changes, prominent marginal osteophyte. Marginal osteophyte formation in the lateral compartment without significant joint space loss.         Impression: Ayo Hernández is a 45 year old male with chronic right knee pain, primary osteoarthritis, effusion.    Plan:   * WB status: weightbearing per comfort.    * Rest  * Activity modification - avoid activities that aggravate symptoms.  * NSAIDS - regular use for inflammation, with food, as long as no contra-indications. Please discuss with primary care provider  if needed.  * Ice twice daily to three times daily as needed.  * Compression wrap as needed.  * Elevation of extremity to reduce swelling as needed.  * Injections: risks and perceived benefits discussed today. Patient elects to proceed right knee aspiration and depomedrol injection today.  * Tylenol as needed for pain  * return to clinic as needed    Ayo to follow up with Primary Care provider regarding elevated blood pressure.      Osvaldo Mccoy MD  Dept. of Orthopaedic Surgery  [facility]    Large Joint Injection/Arthocentesis: R knee joint    Date/Time: 7/7/2021 2:17 PM  Performed by: Osvaldo Mccoy MD  Authorized by: Osvaldo Mccoy MD     Indications:  Pain  Needle Size:  22 G  Guidance: landmark guided    Approach:  Superolateral  Location:  Knee      Medications:  80 mg methylPREDNISolone 80 MG/ML; 4 mL bupivacaine 0.25 %  Aspirate amount (mL):  50  Aspirate:  Bloody  Outcome:  Tolerated well, no immediate complications  Procedure discussed:  discussed risks, benefits, and alternatives    Consent Given by:  Patient  Timeout: timeout called immediately prior to procedure    Prep: patient was prepped and draped in usual sterile fashion              Again, thank you for allowing me to participate in the care of your patient.        Sincerely,        Osvaldo Mccoy MD

## 2021-07-07 NOTE — PROGRESS NOTES
"Chief Complaint   Patient presents with     Right Knee - Pain     Last injections: bilateral knee 5/3/21. Patient notes that his knee started to hurt on 7/3 and has gotten worse and worse. He is present using a cane as an aid. Pain is in the anterior knee. He has an uncomfortable pressure. NKI.        HISTORY OF PRESENT ILLNESS:  Ayo Hernández is a 45 year old male seen for chronic bilateral knee pain, advanced primary osteoarthritis.  He returns today with pain, right more than left. Started to have pain on 7/3/2021 and getting worse. Using a cane to help walk. Locates pain to the front of the knee. Feels \"pressure\" in the knee. He admits going to [facility] on Monday and had xrays and 75cc drained but not injected. Swelling came right back. 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    We last saw him 5/3/2021 and that time right knee was bother more, not sure if he twisted it working out or not, but was gradually Improving.      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      No past medical history on file.    Past Surgical History:   Procedure Laterality Date     ARTHROSCOPY KNEE Right 7/15/2016    Procedure: ARTHROSCOPY KNEE;  Surgeon: Osvaldo Mccoy MD;  Location:  OR     ORTHOPEDIC SURGERY  08.2012    left knee, arthroscopy     ORTHOPEDIC SURGERY  07.15.2016    right knee       Medications:   Current Outpatient Medications:      amLODIPine (NORVASC) 10 MG tablet, TAKE 1 TABLET BY MOUTH ONCE DAILY, Disp: 30 tablet, Rfl: 0     ibuprofen (ADVIL,MOTRIN) 200 MG tablet, Take 200 mg by mouth every 4 hours as needed., Disp: , Rfl:     Current Facility-Administered Medications:      bupivacaine (MARCAINE) 0.25 % injection 4 mL, 4 mL, , , Osvaldo Mccoy MD, 4 mL at 05/03/21 1639     bupivacaine (MARCAINE) 0.25 % injection 4 mL, 4 mL, , , Osvaldo Mccoy MD, 4 mL at 05/03/21 1639    " " methylPREDNISolone (DEPO-MEDROL) injection 80 mg, 80 mg, , , Osvaldo Mccoy MD, 80 mg at 05/03/21 1639     methylPREDNISolone (DEPO-MEDROL) injection 80 mg, 80 mg, , , Osvaldo Mccoy MD, 80 mg at 05/03/21 1639    Allergies: No Known Allergies       REVIEW OF SYSTEMS:   CONSTITUTIONAL:NEGATIVE for fever, chills, night sweats  INTEGUMENTARY/SKIN: NEGATIVE for worrisome wound problems or redness.  MUSCULOSKELETAL:See HPI above  NEURO: NEGATIVE for weakness, dizziness or paresthesias      PHYSICAL EXAM:  BP (!) 163/93   Pulse 72   Ht 1.854 m (6' 1\")   Wt 98.9 kg (218 lb)   BMI 28.76 kg/m     GENERAL APPEARANCE: healthy, alert, no distress; accompanied by his wife.  SKIN: no suspicious lesions or rashes  NEURO: Normal strength and tone, mentation intact and speech normal  PSYCH:  mentation appears normal and affect normal, not anxious  RESPIRATORY: No increased work of breathing.    Gait: favors the right using cane.  Alignment: varus    No Quad atrophy, strength normal.  Intact sensation deep peroneal nerve, superficial peroneal nerve, med/lat tibial nerve, sural nerve, saphenous nerve  Intact EHL, EDL, TA, FHL, GS, quadriceps hamstrings and hip flexors  Toes warm and well perfused, brisk capillary refill. Palpable 2+ dp pulses.  calf soft and nttp or squeeze.  Edema: none    right  KNEE EXAM:    Skin: intact, no ecchymosis.  ROM: 4 degrees extension to 90 degrees flexion; limited by discomfort.  Effusion: moderate   Tender: medial joint line.   Positive crepitus.  Palpable marginal osteophytes    Left knee exam:  Skin intact. No erythema or ecchymosis.  ROM: 3 extension to 100 flexion  Effusion: trace  Tender to palpation medial joint line, lateral joint line  Positive medial and patello-femoral crepitus.  Palpable marginal osteophytes.      X-RAY: no new images today.    3 views bilateral knees taken on 2/5/2018    Left: Advanced lateral compartment degenerative changes similar to the prior study, bone on " bone with articular flattening, subchondral sclerosis. Moderate patellofemoral degenerative changes with large marginal osteophytes.     Right: Moderate patellofemoral degenerative changes, prominent marginal osteophyte. Marginal osteophyte formation in the lateral compartment without significant joint space loss.         Impression: Ayo Hernández is a 45 year old male with chronic right knee pain, primary osteoarthritis, effusion.    Plan:   * WB status: weightbearing per comfort.    * Rest  * Activity modification - avoid activities that aggravate symptoms.  * NSAIDS - regular use for inflammation, with food, as long as no contra-indications. Please discuss with primary care provider  if needed.  * Ice twice daily to three times daily as needed.  * Compression wrap as needed.  * Elevation of extremity to reduce swelling as needed.  * Injections: risks and perceived benefits discussed today. Patient elects to proceed right knee aspiration and depomedrol injection today.  * Tylenol as needed for pain  * return to clinic as needed    Ayo to follow up with Primary Care provider regarding elevated blood pressure.      Osvaldo Mccoy MD  Dept. of Orthopaedic Surgery  [facility]    Large Joint Injection/Arthocentesis: R knee joint    Date/Time: 7/7/2021 2:17 PM  Performed by: Osvaldo Mccoy MD  Authorized by: Osvaldo Mccoy MD     Indications:  Pain  Needle Size:  22 G  Guidance: landmark guided    Approach:  Superolateral  Location:  Knee      Medications:  80 mg methylPREDNISolone 80 MG/ML; 4 mL bupivacaine 0.25 %  Aspirate amount (mL):  50  Aspirate:  Bloody  Outcome:  Tolerated well, no immediate complications  Procedure discussed: discussed risks, benefits, and alternatives    Consent Given by:  Patient  Timeout: timeout called immediately prior to procedure    Prep: patient was prepped and draped in usual sterile fashion

## 2021-07-09 ENCOUNTER — OFFICE VISIT (OUTPATIENT)
Dept: FAMILY MEDICINE | Facility: CLINIC | Age: 46
End: 2021-07-09
Payer: COMMERCIAL

## 2021-07-09 VITALS
TEMPERATURE: 97.7 F | HEIGHT: 73 IN | BODY MASS INDEX: 29.55 KG/M2 | HEART RATE: 60 BPM | DIASTOLIC BLOOD PRESSURE: 94 MMHG | SYSTOLIC BLOOD PRESSURE: 156 MMHG | WEIGHT: 223 LBS

## 2021-07-09 DIAGNOSIS — M48.061 SPINAL STENOSIS OF LUMBAR REGION, UNSPECIFIED WHETHER NEUROGENIC CLAUDICATION PRESENT: ICD-10-CM

## 2021-07-09 DIAGNOSIS — Z12.5 SCREENING PSA (PROSTATE SPECIFIC ANTIGEN): ICD-10-CM

## 2021-07-09 DIAGNOSIS — I10 HYPERTENSION GOAL BP (BLOOD PRESSURE) < 140/90: Primary | ICD-10-CM

## 2021-07-09 LAB
ALBUMIN SERPL-MCNC: 3.3 G/DL (ref 3.4–5)
ANION GAP SERPL CALCULATED.3IONS-SCNC: 3 MMOL/L (ref 3–14)
BUN SERPL-MCNC: 29 MG/DL (ref 7–30)
CALCIUM SERPL-MCNC: 8.7 MG/DL (ref 8.5–10.1)
CHLORIDE SERPL-SCNC: 107 MMOL/L (ref 94–109)
CO2 SERPL-SCNC: 30 MMOL/L (ref 20–32)
CREAT SERPL-MCNC: 0.88 MG/DL (ref 0.66–1.25)
GFR SERPL CREATININE-BSD FRML MDRD: >90 ML/MIN/{1.73_M2}
GLUCOSE SERPL-MCNC: 87 MG/DL (ref 70–99)
PHOSPHATE SERPL-MCNC: 2.6 MG/DL (ref 2.5–4.5)
POTASSIUM SERPL-SCNC: 4.6 MMOL/L (ref 3.4–5.3)
PSA SERPL-ACNC: 0.32 UG/L (ref 0–4)
SODIUM SERPL-SCNC: 140 MMOL/L (ref 133–144)

## 2021-07-09 PROCEDURE — 80069 RENAL FUNCTION PANEL: CPT | Performed by: FAMILY MEDICINE

## 2021-07-09 PROCEDURE — 99214 OFFICE O/P EST MOD 30 MIN: CPT | Performed by: FAMILY MEDICINE

## 2021-07-09 PROCEDURE — G0103 PSA SCREENING: HCPCS | Performed by: FAMILY MEDICINE

## 2021-07-09 PROCEDURE — 36415 COLL VENOUS BLD VENIPUNCTURE: CPT | Performed by: FAMILY MEDICINE

## 2021-07-09 RX ORDER — LISINOPRIL 5 MG/1
5 TABLET ORAL DAILY
Qty: 90 TABLET | Refills: 0 | Status: SHIPPED | OUTPATIENT
Start: 2021-07-09 | End: 2021-10-29

## 2021-07-09 RX ORDER — AMLODIPINE BESYLATE 10 MG/1
10 TABLET ORAL DAILY
Qty: 90 TABLET | Refills: 1 | Status: SHIPPED | OUTPATIENT
Start: 2021-07-09 | End: 2021-07-20

## 2021-07-09 ASSESSMENT — MIFFLIN-ST. JEOR: SCORE: 1940.4

## 2021-07-09 NOTE — PROGRESS NOTES
ASSESSMENT / PLAN:  (I10) Hypertension goal BP (blood pressure) < 140/90  (primary encounter diagnosis)  Comment: needs help  Plan: Renal panel (Alb, BUN, Ca, Cl, CO2, Creat,         Gluc, Phos, K, Na), amLODIPine (NORVASC) 10 MG         tablet, lisinopril (ZESTRIL) 5 MG tablet        Restart lisinopril - very helpful in past. Continue self-monitor/exercise and limit sodium.   Patient can take high dosage of ibuprofen with lisinopril. Recheck in 6 months  Sooner if worse/new issues. Call/email with questions/concerns. Chest pain or shortness of breath or stroke symptoms to er.     (Z12.5) Screening PSA (prostate specific antigen)  Plan: PSA, screen            (M48.061) Spinal stenosis of lumbar region, unspecified whether neurogenic claudication present  Plan: patient needs to limit nsaids. Tylenol ok. Follow-up per specialsit.             Adolfo Rollins is a 45 year old who presents for the following health issues   Follow-up htn and ed. Normal lipids and glucose in past. Family history htn. Dad cva late 40's.   Outside blood pressure reading high with donating blood. Family ok. Stress overall stable.   Sleep ok.  Exercise regularly. No chest pain or shortness of breath . No nausea, vomiting or diarrhea or black/bloody stools. Lisinopril ok in past.   No exercise  Recently. Water intake good. Ibuprofen past week 800mg TID past week.   Tylenol -not taking.   HPI     Hypertension Follow-up      Do you check your blood pressure regularly outside of the clinic? Yes     Are you following a low salt diet? Yes    Are your blood pressures ever more than 140 on the top number (systolic) OR more   than 90 on the bottom number (diastolic), for example 140/90? Yes      How many servings of fruits and vegetables do you eat daily?  2-3    On average, how many sweetened beverages do you drink each day (Examples: soda, juice, sweet tea, etc.  Do NOT count diet or artificially sweetened beverages)?   0    How many days per  "week do you exercise enough to make your heart beat faster? 5    How many minutes a day do you exercise enough to make your heart beat faster? 30 - 60    How many days per week do you miss taking your medication? 0        Review of Systems   All other ROS negative.       Objective    BP (!) 156/94   Pulse 60   Temp 97.7  F (36.5  C) (Oral)   Ht 1.854 m (6' 1\")   Wt 101.2 kg (223 lb)   BMI 29.42 kg/m    Body mass index is 29.42 kg/m .  Physical Exam   GENERAL: healthy, alert and no distress  EYES: Eyes grossly normal to inspection, PERRL and conjunctivae and sclerae normal  NECK: no adenopathy, no asymmetry, masses, or scars and thyroid normal to palpation  RESP: lungs clear to auscultation - no rales, rhonchi or wheezes  CV: regular rate and rhythm, normal S1 S2, no S3 or S4, no murmur, click or rub, no peripheral edema and peripheral pulses strong  ABDOMEN: soft, nontender, no hepatosplenomegaly, no masses and bowel sounds normal  MS: no gross musculoskeletal defects noted, no edema  PSYCH: mentation appears normal, affect normal/bright          "

## 2021-07-20 DIAGNOSIS — I10 HYPERTENSION GOAL BP (BLOOD PRESSURE) < 140/90: ICD-10-CM

## 2021-07-20 RX ORDER — AMLODIPINE BESYLATE 10 MG/1
TABLET ORAL
Qty: 90 TABLET | Refills: 1 | Status: SHIPPED | OUTPATIENT
Start: 2021-07-20 | End: 2022-03-14

## 2021-09-26 ENCOUNTER — HEALTH MAINTENANCE LETTER (OUTPATIENT)
Age: 46
End: 2021-09-26

## 2021-10-04 ENCOUNTER — OFFICE VISIT (OUTPATIENT)
Dept: ORTHOPEDICS | Facility: CLINIC | Age: 46
End: 2021-10-04
Payer: COMMERCIAL

## 2021-10-04 VITALS
HEIGHT: 73 IN | SYSTOLIC BLOOD PRESSURE: 145 MMHG | BODY MASS INDEX: 29.95 KG/M2 | WEIGHT: 226 LBS | DIASTOLIC BLOOD PRESSURE: 85 MMHG

## 2021-10-04 DIAGNOSIS — M17.0 PRIMARY OSTEOARTHRITIS OF BOTH KNEES: Primary | ICD-10-CM

## 2021-10-04 PROCEDURE — 20610 DRAIN/INJ JOINT/BURSA W/O US: CPT | Mod: 50 | Performed by: ORTHOPAEDIC SURGERY

## 2021-10-04 RX ORDER — BUPIVACAINE HYDROCHLORIDE 2.5 MG/ML
4 INJECTION, SOLUTION INFILTRATION; PERINEURAL
Status: DISCONTINUED | OUTPATIENT
Start: 2021-10-04 | End: 2022-05-02

## 2021-10-04 RX ORDER — METHYLPREDNISOLONE ACETATE 80 MG/ML
80 INJECTION, SUSPENSION INTRA-ARTICULAR; INTRALESIONAL; INTRAMUSCULAR; SOFT TISSUE
Status: DISCONTINUED | OUTPATIENT
Start: 2021-10-04 | End: 2022-05-02

## 2021-10-04 RX ADMIN — METHYLPREDNISOLONE ACETATE 80 MG: 80 INJECTION, SUSPENSION INTRA-ARTICULAR; INTRALESIONAL; INTRAMUSCULAR; SOFT TISSUE at 16:10

## 2021-10-04 RX ADMIN — BUPIVACAINE HYDROCHLORIDE 4 ML: 2.5 INJECTION, SOLUTION INFILTRATION; PERINEURAL at 16:10

## 2021-10-04 ASSESSMENT — MIFFLIN-ST. JEOR: SCORE: 1949.01

## 2021-10-04 ASSESSMENT — PAIN SCALES - GENERAL: PAINLEVEL: SEVERE PAIN (7)

## 2021-10-04 NOTE — LETTER
10/4/2021         RE: Ayo Hernández  [address]  [facility] [address]        Dear Colleague,    Thank you for referring your patient, Ayo Hernández, to the [facility] [place]. Please see a copy of my visit note below.    Large Joint Injection/Arthocentesis: bilateral knee    Date/Time: 10/4/2021 4:10 PM  Performed by: Osvaldo Mccoy MD  Authorized by: Osvaldo Mccoy MD     Indications:  Pain  Needle Size:  22 G  Guidance: landmark guided    Approach:  Anteromedial  Location:  Knee  Laterality:  Bilateral      Medications (Right):  4 mL bupivacaine 0.25 %; 80 mg methylPREDNISolone 80 MG/ML  Medications (Left):  4 mL bupivacaine 0.25 %; 80 mg methylPREDNISolone 80 MG/ML  Outcome:  Tolerated well, no immediate complications  Procedure discussed: discussed risks, benefits, and alternatives    Consent Given by:  Patient  Timeout: timeout called immediately prior to procedure    Prep: patient was prepped and draped in usual sterile fashion            Chief Complaint   Patient presents with     Knee Pain     Bilateral knee pain. Last injection: right knee only 7/7/21. Injections work good. He would like more injections today. Right knee is worse.       HISTORY OF PRESENT ILLNESS:  Ayo Hernández is a 46 year old male seen for chronic bilateral knee pain, advanced primary osteoarthritis.  He returns today with pain, right more than left. Last injection, right knee only, 7/7/2021, bilateral knee 5/3/2021. Returns today for repeat injections.      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      No past medical history on file.    Past Surgical History:   Procedure Laterality Date     ARTHROSCOPY KNEE Right 7/15/2016    Procedure: ARTHROSCOPY KNEE;  Surgeon: Osvaldo Mccoy MD;  Location:  OR     ORTHOPEDIC SURGERY  08.2012    left knee, arthroscopy     ORTHOPEDIC SURGERY   "07.15.2016    right knee       Medications:   Current Outpatient Medications:      amLODIPine (NORVASC) 10 MG tablet, TAKE 1 TABLET BY MOUTH ONCE DAILY, Disp: 90 tablet, Rfl: 1     ibuprofen (ADVIL,MOTRIN) 200 MG tablet, Take 200 mg by mouth every 4 hours as needed., Disp: , Rfl:      lisinopril (ZESTRIL) 5 MG tablet, Take 1 tablet (5 mg) by mouth daily, Disp: 90 tablet, Rfl: 0    Allergies: No Known Allergies       REVIEW OF SYSTEMS:   CONSTITUTIONAL:NEGATIVE for fever, chills, night sweats  INTEGUMENTARY/SKIN: NEGATIVE for worrisome wound problems or redness.  MUSCULOSKELETAL:See HPI above  NEURO: NEGATIVE for weakness, dizziness or paresthesias      PHYSICAL EXAM:  BP (!) 145/85   Ht 1.854 m (6' 1\")   Wt 102.5 kg (226 lb)   BMI 29.82 kg/m     GENERAL APPEARANCE: healthy, alert, no distress; accompanied by his wife.  SKIN: no suspicious lesions or rashes  NEURO: Normal strength and tone, mentation intact and speech normal  PSYCH:  mentation appears normal and affect normal, not anxious  RESPIRATORY: No increased work of breathing.    Gait: favors the right using cane.  Alignment: varus    No Quad atrophy, strength normal.  Intact sensation deep peroneal nerve, superficial peroneal nerve, med/lat tibial nerve, sural nerve, saphenous nerve  Intact EHL, EDL, TA, FHL, GS, quadriceps hamstrings and hip flexors  Toes warm and well perfused, brisk capillary refill. Palpable 2+ dp pulses.  calf soft and nttp or squeeze.  Edema: none    right  KNEE EXAM:    Skin: intact, no ecchymosis.  ROM: 4 degrees extension to 90 degrees flexion; limited by discomfort.  Effusion: trace  Tender: medial joint line.   Positive crepitus.  Palpable marginal osteophytes    Left knee exam:  Skin intact. No erythema or ecchymosis.  ROM: 3 extension to 100 flexion  Effusion: trace  Tender to palpation medial joint line, lateral joint line  Positive medial and patello-femoral crepitus.  Palpable marginal osteophytes.      X-RAY: no new images " today.    3 views bilateral knees taken on 2/5/2018    Left: Advanced lateral compartment degenerative changes similar to the prior study, bone on bone with articular flattening, subchondral sclerosis. Moderate patellofemoral degenerative changes with large marginal osteophytes.     Right: Moderate patellofemoral degenerative changes, prominent marginal osteophyte. Marginal osteophyte formation in the lateral compartment without significant joint space loss.         Impression: Ayo Hernández is a 46 year old male with chronic bilateral knee pain, advanced primary osteoarthritis.    Plan:   * WB status: weightbearing per comfort.    * Rest  * Activity modification - avoid activities that aggravate symptoms.  * NSAIDS - regular use for inflammation, with food, as long as no contra-indications. Please discuss with primary care provider  if needed.  * Ice twice daily to three times daily as needed.  * Compression wrap as needed.  * Elevation of extremity to reduce swelling as needed.  * Injections: risks and perceived benefits discussed today. Patient elects to proceed with bilateral knee injections today  * Tylenol as needed for pain  * return to clinic as needed    Ayo to follow up with Primary Care provider regarding elevated blood pressure.      Osvaldo Mccoy MD  Dept. of Orthopaedic Surgery  [facility]        Again, thank you for allowing me to participate in the care of your patient.        Sincerely,        Osvaldo Mccoy MD

## 2021-10-04 NOTE — PROGRESS NOTES
Large Joint Injection/Arthocentesis: bilateral knee    Date/Time: 10/4/2021 4:10 PM  Performed by: Osvaldo Mccoy MD  Authorized by: Osvaldo Mccoy MD     Indications:  Pain  Needle Size:  22 G  Guidance: landmark guided    Approach:  Anteromedial  Location:  Knee  Laterality:  Bilateral      Medications (Right):  4 mL bupivacaine 0.25 %; 80 mg methylPREDNISolone 80 MG/ML  Medications (Left):  4 mL bupivacaine 0.25 %; 80 mg methylPREDNISolone 80 MG/ML  Outcome:  Tolerated well, no immediate complications  Procedure discussed: discussed risks, benefits, and alternatives    Consent Given by:  Patient  Timeout: timeout called immediately prior to procedure    Prep: patient was prepped and draped in usual sterile fashion

## 2021-10-04 NOTE — PROGRESS NOTES
"Chief Complaint   Patient presents with     Knee Pain     Bilateral knee pain. Last injection: right knee only 7/7/21. Injections work good. He would like more injections today. Right knee is worse.       HISTORY OF PRESENT ILLNESS:  Ayo Hernández is a 46 year old male seen for chronic bilateral knee pain, advanced primary osteoarthritis.  He returns today with pain, right more than left. Last injection, right knee only, 7/7/2021, bilateral knee 5/3/2021. Returns today for repeat injections.      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      No past medical history on file.    Past Surgical History:   Procedure Laterality Date     ARTHROSCOPY KNEE Right 7/15/2016    Procedure: ARTHROSCOPY KNEE;  Surgeon: Osvaldo Mccoy MD;  Location:  OR     ORTHOPEDIC SURGERY  08.2012    left knee, arthroscopy     ORTHOPEDIC SURGERY  07.15.2016    right knee       Medications:   Current Outpatient Medications:      amLODIPine (NORVASC) 10 MG tablet, TAKE 1 TABLET BY MOUTH ONCE DAILY, Disp: 90 tablet, Rfl: 1     ibuprofen (ADVIL,MOTRIN) 200 MG tablet, Take 200 mg by mouth every 4 hours as needed., Disp: , Rfl:      lisinopril (ZESTRIL) 5 MG tablet, Take 1 tablet (5 mg) by mouth daily, Disp: 90 tablet, Rfl: 0    Allergies: No Known Allergies       REVIEW OF SYSTEMS:   CONSTITUTIONAL:NEGATIVE for fever, chills, night sweats  INTEGUMENTARY/SKIN: NEGATIVE for worrisome wound problems or redness.  MUSCULOSKELETAL:See HPI above  NEURO: NEGATIVE for weakness, dizziness or paresthesias      PHYSICAL EXAM:  BP (!) 145/85   Ht 1.854 m (6' 1\")   Wt 102.5 kg (226 lb)   BMI 29.82 kg/m     GENERAL APPEARANCE: healthy, alert, no distress; accompanied by his wife.  SKIN: no suspicious lesions or rashes  NEURO: Normal strength and tone, mentation intact and speech normal  PSYCH:  mentation appears normal and affect normal, not " anxious  RESPIRATORY: No increased work of breathing.    Gait: favors the right using cane.  Alignment: varus    No Quad atrophy, strength normal.  Intact sensation deep peroneal nerve, superficial peroneal nerve, med/lat tibial nerve, sural nerve, saphenous nerve  Intact EHL, EDL, TA, FHL, GS, quadriceps hamstrings and hip flexors  Toes warm and well perfused, brisk capillary refill. Palpable 2+ dp pulses.  calf soft and nttp or squeeze.  Edema: none    right  KNEE EXAM:    Skin: intact, no ecchymosis.  ROM: 4 degrees extension to 90 degrees flexion; limited by discomfort.  Effusion: trace  Tender: medial joint line.   Positive crepitus.  Palpable marginal osteophytes    Left knee exam:  Skin intact. No erythema or ecchymosis.  ROM: 3 extension to 100 flexion  Effusion: trace  Tender to palpation medial joint line, lateral joint line  Positive medial and patello-femoral crepitus.  Palpable marginal osteophytes.      X-RAY: no new images today.    3 views bilateral knees taken on 2/5/2018    Left: Advanced lateral compartment degenerative changes similar to the prior study, bone on bone with articular flattening, subchondral sclerosis. Moderate patellofemoral degenerative changes with large marginal osteophytes.     Right: Moderate patellofemoral degenerative changes, prominent marginal osteophyte. Marginal osteophyte formation in the lateral compartment without significant joint space loss.         Impression: Ayo Hernández is a 46 year old male with chronic bilateral knee pain, advanced primary osteoarthritis.    Plan:   * WB status: weightbearing per comfort.    * Rest  * Activity modification - avoid activities that aggravate symptoms.  * NSAIDS - regular use for inflammation, with food, as long as no contra-indications. Please discuss with primary care provider  if needed.  * Ice twice daily to three times daily as needed.  * Compression wrap as needed.  * Elevation of extremity to reduce swelling as  needed.  * Injections: risks and perceived benefits discussed today. Patient elects to proceed with bilateral knee injections today  * Tylenol as needed for pain  * return to clinic as needed    Ayo to follow up with Primary Care provider regarding elevated blood pressure.      Osvaldo Mccoy MD  Dept. of Orthopaedic Surgery  [facility]

## 2021-10-29 ENCOUNTER — OFFICE VISIT (OUTPATIENT)
Dept: FAMILY MEDICINE | Facility: CLINIC | Age: 46
End: 2021-10-29
Payer: COMMERCIAL

## 2021-10-29 VITALS
OXYGEN SATURATION: 97 % | HEART RATE: 66 BPM | WEIGHT: 224 LBS | DIASTOLIC BLOOD PRESSURE: 88 MMHG | TEMPERATURE: 97.7 F | SYSTOLIC BLOOD PRESSURE: 137 MMHG | BODY MASS INDEX: 29.55 KG/M2

## 2021-10-29 DIAGNOSIS — R21 RASH: ICD-10-CM

## 2021-10-29 DIAGNOSIS — H69.93 DYSFUNCTION OF BOTH EUSTACHIAN TUBES: Primary | ICD-10-CM

## 2021-10-29 DIAGNOSIS — I10 HYPERTENSION GOAL BP (BLOOD PRESSURE) < 140/90: ICD-10-CM

## 2021-10-29 PROCEDURE — 99214 OFFICE O/P EST MOD 30 MIN: CPT | Performed by: FAMILY MEDICINE

## 2021-10-29 RX ORDER — TRIAMCINOLONE ACETONIDE 1 MG/G
CREAM TOPICAL 2 TIMES DAILY PRN
Qty: 30 G | Refills: 0 | Status: SHIPPED | OUTPATIENT
Start: 2021-10-29 | End: 2023-02-07

## 2021-10-29 RX ORDER — LISINOPRIL 5 MG/1
5 TABLET ORAL DAILY
Qty: 90 TABLET | Refills: 0 | Status: SHIPPED | OUTPATIENT
Start: 2021-10-29 | End: 2022-05-09

## 2021-10-29 NOTE — PROGRESS NOTES
SUBJECTIVE:  Ayo Hernández, a 46 year old male scheduled an appointment to discuss the following issues:  Skin concern on face - x 6 months , red bumps is spreading on face denies pain or itching   Check both ear- plugged poss wax build up - past 6 months. Uses q-tips. Tried over the counter wax aide.   Face rash. History psoriasis in past. No prescription meds. No issues in scalp.   Follow-up htn too. Ran out of lisinopril. Outside blood pressure ok. exervcise regularly. No chest pain or shortness of breath.no side effects.     Medical, social, surgical, and family histories reviewed.    ROS:  All other ROS negative.   OBJECTIVE:  /88   Pulse 66   Temp 97.7  F (36.5  C) (Tympanic)   Wt 101.6 kg (224 lb)   SpO2 97%   BMI 29.55 kg/m   EXAM:  GENERAL APPEARANCE: healthy, alert and no distress  EYES: EOMI,  PERRL  HENT: ear canals and TM's normal and nose clear discharge and mouth without ulcers or lesions  NECK: no adenopathy, no asymmetry, masses, or scars and thyroid normal to palpation  RESP: lungs clear to auscultation - no rales, rhonchi or wheezes  CV: regular rates and rhythm, normal S1 S2, no S3 or S4 and no murmur, click or rub -  ABDOMEN:  soft, nontender, no HSM or masses and bowel sounds normal  MS: extremities normal- no gross deformities noted, no evidence of inflammation in joints, FROM in all extremities.  SKIN: red macular rash bilateral upper cheeks.   PSYCH: mentation appears normal and affect normal/bright    ASSESSMENT / PLAN:  (H69.83) Dysfunction of both eustachian tubes  (primary encounter diagnosis)  Comment: from ALLERGIC RHINITIS vs ?  Plan: over the counter flonase/claritin and chew gum/ear clearing. To ENT if persists. Expected course and warning signs reviewed.     (I10) Hypertension goal BP (blood pressure) < 140/90  Comment: stable  Plan: lisinopril (ZESTRIL) 5 MG tablet        Continue self-monitor/exercise. Reveiwed risks and side effects of medication  Recheck in 4  months  Sooner if worse. Chest pain or shortness of breath to er.     (R21) Rash  Comment: dermatitis vs psorisis vs ?  Plan: triamcinolone (KENALOG) 0.1 % external cream        Follow-up derm if prolonged usage of cream/not improving or worse. Expected course and warning signs reviewed. Call/email with questions/concerns. Reveiwed risks and side effects of medication      Juan Pablo Kimbrough MD

## 2021-10-29 NOTE — PROGRESS NOTES
Skin concern on face - x 6 months , red bumps is spreading on face denies pain or itching   Check both ear- plugged poss wax build up

## 2022-01-24 ENCOUNTER — OFFICE VISIT (OUTPATIENT)
Dept: ORTHOPEDICS | Facility: CLINIC | Age: 47
End: 2022-01-24
Payer: COMMERCIAL

## 2022-01-24 VITALS
RESPIRATION RATE: 16 BRPM | HEART RATE: 60 BPM | SYSTOLIC BLOOD PRESSURE: 130 MMHG | DIASTOLIC BLOOD PRESSURE: 80 MMHG | OXYGEN SATURATION: 98 %

## 2022-01-24 DIAGNOSIS — M17.0 BILATERAL PRIMARY OSTEOARTHRITIS OF KNEE: Primary | ICD-10-CM

## 2022-01-24 PROCEDURE — 20610 DRAIN/INJ JOINT/BURSA W/O US: CPT | Mod: 50 | Performed by: ORTHOPAEDIC SURGERY

## 2022-01-24 RX ORDER — BUPIVACAINE HYDROCHLORIDE 2.5 MG/ML
4 INJECTION, SOLUTION INFILTRATION; PERINEURAL
Status: DISCONTINUED | OUTPATIENT
Start: 2022-01-24 | End: 2022-05-02

## 2022-01-24 RX ORDER — METHYLPREDNISOLONE ACETATE 80 MG/ML
80 INJECTION, SUSPENSION INTRA-ARTICULAR; INTRALESIONAL; INTRAMUSCULAR; SOFT TISSUE
Status: DISCONTINUED | OUTPATIENT
Start: 2022-01-24 | End: 2022-05-02

## 2022-01-24 RX ADMIN — BUPIVACAINE HYDROCHLORIDE 4 ML: 2.5 INJECTION, SOLUTION INFILTRATION; PERINEURAL at 17:02

## 2022-01-24 RX ADMIN — METHYLPREDNISOLONE ACETATE 80 MG: 80 INJECTION, SUSPENSION INTRA-ARTICULAR; INTRALESIONAL; INTRAMUSCULAR; SOFT TISSUE at 17:02

## 2022-01-24 ASSESSMENT — PAIN SCALES - GENERAL: PAINLEVEL: SEVERE PAIN (7)

## 2022-01-24 NOTE — PROGRESS NOTES
Chief Complaint   Patient presents with     Follow up bilateral knee     Bilateral knee pain. Last injection:10/4/21 . Injections work good. He would like repeat injections today. Left knee is worse.       HISTORY OF PRESENT ILLNESS:  Ayo Hernández is a 46 year old male seen for chronic bilateral knee pain, advanced primary osteoarthritis.  He returns today with pain, left more than right today. Last injections bilateral 10/4/2021, right knee only, 7/7/2021, bilateral knee 5/3/2021. Returns today for repeat injections. 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      No past medical history on file.    Past Surgical History:   Procedure Laterality Date     ARTHROSCOPY KNEE Right 7/15/2016    Procedure: ARTHROSCOPY KNEE;  Surgeon: Osvaldo Mccoy MD;  Location:  OR     ORTHOPEDIC SURGERY  08.2012    left knee, arthroscopy     ORTHOPEDIC SURGERY  07.15.2016    right knee       Medications:   Current Outpatient Medications:      amLODIPine (NORVASC) 10 MG tablet, TAKE 1 TABLET BY MOUTH ONCE DAILY, Disp: 90 tablet, Rfl: 1     ibuprofen (ADVIL,MOTRIN) 200 MG tablet, Take 200 mg by mouth every 4 hours as needed., Disp: , Rfl:      lisinopril (ZESTRIL) 5 MG tablet, Take 1 tablet (5 mg) by mouth daily, Disp: 90 tablet, Rfl: 0     triamcinolone (KENALOG) 0.1 % external cream, Apply topically 2 times daily as needed for irritation For up to 10 days as needed for rash, Disp: 30 g, Rfl: 0    Current Facility-Administered Medications:      bupivacaine (MARCAINE) 0.25 % injection 4 mL, 4 mL, , , Osvaldo Mccoy MD, 4 mL at 10/04/21 1610     bupivacaine (MARCAINE) 0.25 % injection 4 mL, 4 mL, , , Osvaldo Mccoy MD, 4 mL at 10/04/21 1610     methylPREDNISolone (DEPO-MEDROL) injection 80 mg, 80 mg, , , Osvaldo Mccoy MD, 80 mg at 10/04/21 1610     methylPREDNISolone (DEPO-MEDROL) injection  80 mg, 80 mg, , , Osvaldo Mccoy MD, 80 mg at 10/04/21 1610    Allergies: No Known Allergies       REVIEW OF SYSTEMS:   CONSTITUTIONAL:NEGATIVE for fever, chills, night sweats  INTEGUMENTARY/SKIN: NEGATIVE for worrisome wound problems or redness.  MUSCULOSKELETAL:See HPI above  NEURO: NEGATIVE for weakness, dizziness or paresthesias      PHYSICAL EXAM:  /80   Pulse 60   Resp 16   SpO2 98%    GENERAL APPEARANCE: healthy, alert, no distress  SKIN: no suspicious lesions or rashes  NEURO: Normal strength and tone, mentation intact and speech normal  PSYCH:  mentation appears normal and affect normal, not anxious  RESPIRATORY: No increased work of breathing.    Gait: favors the left   Alignment: varus    No Quad atrophy, strength normal.  Intact sensation deep peroneal nerve, superficial peroneal nerve, med/lat tibial nerve, sural nerve, saphenous nerve  Intact EHL, EDL, TA, FHL, GS, quadriceps hamstrings and hip flexors  Toes warm and well perfused, brisk capillary refill. Palpable 2+ dp pulses.  calf soft and nttp or squeeze.  Edema: none    right  KNEE EXAM:    Skin: intact, no ecchymosis.  ROM: 4 degrees extension to 90 degrees flexion; limited by discomfort.  Effusion: trace  Tender: medial joint line.   Positive crepitus.  Palpable marginal osteophytes    Left knee exam:  Skin intact. No erythema or ecchymosis.  ROM: 3 extension to 100 flexion  Effusion: trace  Tender to palpation medial joint line, lateral joint line  Positive medial and patello-femoral crepitus.  Palpable marginal osteophytes.      X-RAY: no new images today.    3 views bilateral knees taken on 2/5/2018    Left: Advanced lateral compartment degenerative changes similar to the prior study, bone on bone with articular flattening, subchondral sclerosis. Moderate patellofemoral degenerative changes with large marginal osteophytes.     Right: Moderate patellofemoral degenerative changes, prominent marginal osteophyte. Marginal osteophyte  formation in the lateral compartment without significant joint space loss.         Impression: Ayo Hernández is a 46 year old male with chronic bilateral knee pain, advanced primary osteoarthritis.    Plan:   * WB status: weightbearing per comfort.    * Rest  * Activity modification - avoid activities that aggravate symptoms.  * NSAIDS - regular use for inflammation, with food, as long as no contra-indications. Please discuss with primary care provider  if needed.  * Ice twice daily to three times daily as needed.  * Compression wrap as needed.  * Elevation of extremity to reduce swelling as needed.  * Injections: risks and perceived benefits discussed today. Patient elects to proceed with bilateral knee injections today  * Tylenol as needed for pain  * return to clinic as needed      Osvaldo Mccoy MD  Dept. of Orthopaedic Surgery  [facility]      Large Joint Injection/Arthocentesis: bilateral knee    Date/Time: 1/24/2022 5:02 PM  Performed by: Van Napoles PA  Authorized by: Osvaldo Mccoy MD     Indications:  Pain and osteoarthritis  Needle Size:  22 G  Guidance: landmark guided    Approach:  Anteromedial  Location:  Knee  Laterality:  Bilateral      Medications (Right):  80 mg methylPREDNISolone 80 MG/ML; 4 mL bupivacaine 0.25 %  Medications (Left):  80 mg methylPREDNISolone 80 MG/ML; 4 mL bupivacaine 0.25 %  Outcome:  Tolerated well, no immediate complications  Procedure discussed: discussed risks, benefits, and alternatives    Consent Given by:  Patient  Prep: patient was prepped and draped in usual sterile fashion

## 2022-01-24 NOTE — LETTER
1/24/2022         RE: Ayo Hernández  [address] [facility] [address]        Dear Colleague,    Thank you for referring your patient, Ayo Hernández, to the [facility]. Please see a copy of my visit note below.    Chief Complaint   Patient presents with     Follow up bilateral knee     Bilateral knee pain. Last injection:10/4/21 . Injections work good. He would like repeat injections today. Left knee is worse.       HISTORY OF PRESENT ILLNESS:  Ayo Hernández is a 46 year old male seen for chronic bilateral knee pain, advanced primary osteoarthritis.  He returns today with pain, left more than right today. Last injections bilateral 10/4/2021, right knee only, 7/7/2021, bilateral knee 5/3/2021. Returns today for repeat injections. 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      No past medical history on file.    Past Surgical History:   Procedure Laterality Date     ARTHROSCOPY KNEE Right 7/15/2016    Procedure: ARTHROSCOPY KNEE;  Surgeon: Osvaldo Mccoy MD;  Location:  OR     ORTHOPEDIC SURGERY  08.2012    left knee, arthroscopy     ORTHOPEDIC SURGERY  07.15.2016    right knee       Medications:   Current Outpatient Medications:      amLODIPine (NORVASC) 10 MG tablet, TAKE 1 TABLET BY MOUTH ONCE DAILY, Disp: 90 tablet, Rfl: 1     ibuprofen (ADVIL,MOTRIN) 200 MG tablet, Take 200 mg by mouth every 4 hours as needed., Disp: , Rfl:      lisinopril (ZESTRIL) 5 MG tablet, Take 1 tablet (5 mg) by mouth daily, Disp: 90 tablet, Rfl: 0     triamcinolone (KENALOG) 0.1 % external cream, Apply topically 2 times daily as needed for irritation For up to 10 days as needed for rash, Disp: 30 g, Rfl: 0    Current Facility-Administered Medications:      bupivacaine (MARCAINE) 0.25 % injection 4 mL, 4 mL, , , Osvaldo Mccoy MD, 4 mL at 10/04/21 9572      bupivacaine (MARCAINE) 0.25 % injection 4 mL, 4 mL, , , Osvaldo Mccoy MD, 4 mL at 10/04/21 1610     methylPREDNISolone (DEPO-MEDROL) injection 80 mg, 80 mg, , , Osvaldo Mccoy MD, 80 mg at 10/04/21 1610     methylPREDNISolone (DEPO-MEDROL) injection 80 mg, 80 mg, , , Osvaldo Mccoy MD, 80 mg at 10/04/21 1610    Allergies: No Known Allergies       REVIEW OF SYSTEMS:   CONSTITUTIONAL:NEGATIVE for fever, chills, night sweats  INTEGUMENTARY/SKIN: NEGATIVE for worrisome wound problems or redness.  MUSCULOSKELETAL:See HPI above  NEURO: NEGATIVE for weakness, dizziness or paresthesias      PHYSICAL EXAM:  /80   Pulse 60   Resp 16   SpO2 98%    GENERAL APPEARANCE: healthy, alert, no distress  SKIN: no suspicious lesions or rashes  NEURO: Normal strength and tone, mentation intact and speech normal  PSYCH:  mentation appears normal and affect normal, not anxious  RESPIRATORY: No increased work of breathing.    Gait: favors the left   Alignment: varus    No Quad atrophy, strength normal.  Intact sensation deep peroneal nerve, superficial peroneal nerve, med/lat tibial nerve, sural nerve, saphenous nerve  Intact EHL, EDL, TA, FHL, GS, quadriceps hamstrings and hip flexors  Toes warm and well perfused, brisk capillary refill. Palpable 2+ dp pulses.  calf soft and nttp or squeeze.  Edema: none    right  KNEE EXAM:    Skin: intact, no ecchymosis.  ROM: 4 degrees extension to 90 degrees flexion; limited by discomfort.  Effusion: trace  Tender: medial joint line.   Positive crepitus.  Palpable marginal osteophytes    Left knee exam:  Skin intact. No erythema or ecchymosis.  ROM: 3 extension to 100 flexion  Effusion: trace  Tender to palpation medial joint line, lateral joint line  Positive medial and patello-femoral crepitus.  Palpable marginal osteophytes.      X-RAY: no new images today.    3 views bilateral knees taken on 2/5/2018    Left: Advanced lateral compartment degenerative changes similar to the  prior study, bone on bone with articular flattening, subchondral sclerosis. Moderate patellofemoral degenerative changes with large marginal osteophytes.     Right: Moderate patellofemoral degenerative changes, prominent marginal osteophyte. Marginal osteophyte formation in the lateral compartment without significant joint space loss.         Impression: Ayo Hernández is a 46 year old male with chronic bilateral knee pain, advanced primary osteoarthritis.    Plan:   * WB status: weightbearing per comfort.    * Rest  * Activity modification - avoid activities that aggravate symptoms.  * NSAIDS - regular use for inflammation, with food, as long as no contra-indications. Please discuss with primary care provider  if needed.  * Ice twice daily to three times daily as needed.  * Compression wrap as needed.  * Elevation of extremity to reduce swelling as needed.  * Injections: risks and perceived benefits discussed today. Patient elects to proceed with bilateral knee injections today  * Tylenol as needed for pain  * return to clinic as needed      Osvaldo Mccoy MD  Dept. of Orthopaedic Surgery  [facility]      Large Joint Injection/Arthocentesis: bilateral knee    Date/Time: 1/24/2022 5:02 PM  Performed by: Van Napoles PA  Authorized by: Osvaldo Mccoy MD     Indications:  Pain and osteoarthritis  Needle Size:  22 G  Guidance: landmark guided    Approach:  Anteromedial  Location:  Knee  Laterality:  Bilateral      Medications (Right):  80 mg methylPREDNISolone 80 MG/ML; 4 mL bupivacaine 0.25 %  Medications (Left):  80 mg methylPREDNISolone 80 MG/ML; 4 mL bupivacaine 0.25 %  Outcome:  Tolerated well, no immediate complications  Procedure discussed: discussed risks, benefits, and alternatives    Consent Given by:  Patient  Prep: patient was prepped and draped in usual sterile fashion              Again, thank you for allowing me to participate in the care of your patient.         Sincerely,        Osvaldo Mccoy MD

## 2022-01-24 NOTE — PATIENT INSTRUCTIONS
Please remember to call and schedule a follow up appointment if one was recommended at your earliest convenience.  Orthopedics CLINIC HOURS TELEPHONE NUMBER   Dr. Nadine Fiore  Certified Medical Assistant   Monday & Wednesday   8am - 5pm  Thursday 1pm - 5pm  Friday 8am -11:30am Specialty schedulers:   [phone] to make an appointment with any Specialty Provider.   Main Clinic:   [phone] to make an appointment with your primary provider   Urgent Care locations:    [facility] Monday-Friday Closed  Saturday-Sunday 9am-5pm      Monday-Friday 12pm - 8pm  Saturday-Sunday 9am-5pm [phone]     If SURGERY has been recommended, please call our Specialty Schedulers at [phone] to schedule your procedure.    If you need a medication refill, please contact your pharmacy. Please allow 3 business days for your refill to be completed.    If an MRI or CT scan has been recommended, please call [facility] Schedulers at [phone] to schedule your appointment.  Use [facility] (secure e-mail communication and access to your chart) to send a message or to make an appointment. Please ask how you can sign up for [employer].  Your care team's suggested websites for health information:   [email or web address] : Up to date and easily searchable information on multiple topics.   [email or web address] : [place] dept of heat, public health issues in [place], N1N1

## 2022-03-12 DIAGNOSIS — I10 HYPERTENSION GOAL BP (BLOOD PRESSURE) < 140/90: ICD-10-CM

## 2022-03-14 RX ORDER — AMLODIPINE BESYLATE 10 MG/1
TABLET ORAL
Qty: 90 TABLET | Refills: 0 | Status: SHIPPED | OUTPATIENT
Start: 2022-03-14 | End: 2022-06-16

## 2022-04-29 NOTE — PROGRESS NOTES
"Chief Complaint   Patient presents with     Knee Pain     Bilateral knee pain. Last injections: 1/24/22. Injections work great. He would like repeat injections today. Right knee is worse.        HISTORY OF PRESENT ILLNESS:  Ayo Hernández is a 46 year old male seen for chronic bilateral knee pain, advanced primary osteoarthritis.  He returns today with pain, right more than left  today. Last injections bilateral 1/24/2022, work well.    Some days hardly any pain and other days more severe pain.    Just returned from a trip to [place], weather was nice.      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      No past medical history on file.    Past Surgical History:   Procedure Laterality Date     ARTHROSCOPY KNEE Right 7/15/2016    Procedure: ARTHROSCOPY KNEE;  Surgeon: Osvaldo Mccoy MD;  Location:  OR     ORTHOPEDIC SURGERY  08.2012    left knee, arthroscopy     ORTHOPEDIC SURGERY  07.15.2016    right knee       Medications:   Current Outpatient Medications:      amLODIPine (NORVASC) 10 MG tablet, TAKE 1 TABLET BY MOUTH ONCE DAILY, Disp: 90 tablet, Rfl: 0     ibuprofen (ADVIL,MOTRIN) 200 MG tablet, Take 200 mg by mouth every 4 hours as needed., Disp: , Rfl:      lisinopril (ZESTRIL) 5 MG tablet, Take 1 tablet (5 mg) by mouth daily, Disp: 90 tablet, Rfl: 0     triamcinolone (KENALOG) 0.1 % external cream, Apply topically 2 times daily as needed for irritation For up to 10 days as needed for rash, Disp: 30 g, Rfl: 0    Allergies: No Known Allergies       REVIEW OF SYSTEMS:   CONSTITUTIONAL:NEGATIVE for fever, chills, night sweats  INTEGUMENTARY/SKIN: NEGATIVE for worrisome wound problems or redness.  MUSCULOSKELETAL:See HPI above  NEURO: NEGATIVE for weakness, dizziness or paresthesias      PHYSICAL EXAM:  BP (!) 167/84   Pulse 60   Ht 1.854 m (6' 1\")   Wt 103.9 kg (229 lb)   BMI 30.21 kg/m   "   GENERAL APPEARANCE: healthy, alert, no distress  SKIN: no suspicious lesions or rashes  NEURO: Normal strength and tone, mentation intact and speech normal  PSYCH:  mentation appears normal and affect normal, not anxious  RESPIRATORY: No increased work of breathing.    Gait: favors the left   Alignment: varus    No Quad atrophy, strength normal.  Intact sensation deep peroneal nerve, superficial peroneal nerve, med/lat tibial nerve, sural nerve, saphenous nerve  Intact EHL, EDL, TA, FHL, GS, quadriceps hamstrings and hip flexors  Toes warm and well perfused, brisk capillary refill. Palpable 2+ dp pulses.  calf soft and nttp or squeeze.  Edema: none    right  KNEE EXAM:    Skin: intact, no ecchymosis.  ROM: 4 degrees extension to 90 degrees flexion; limited by discomfort.  Effusion: trace  Tender: medial joint line.   Positive crepitus.  Palpable marginal osteophytes    Left knee exam:  Skin intact. No erythema or ecchymosis.  ROM: 3 extension to 100 flexion  Effusion: trace  Tender to palpation medial joint line, lateral joint line  Positive medial and patello-femoral crepitus.  Palpable marginal osteophytes.      X-RAY: no new images today.    3 views bilateral knees taken on 2/5/2018    Left: Advanced lateral compartment degenerative changes similar to the prior study, bone on bone with articular flattening, subchondral sclerosis. Moderate patellofemoral degenerative changes with large marginal osteophytes.     Right: Moderate patellofemoral degenerative changes, prominent marginal osteophyte. Marginal osteophyte formation in the lateral compartment without significant joint space loss.         Impression: Ayo Hernández is a 46 year old male with chronic bilateral knee pain, advanced primary osteoarthritis.    Plan:   * WB status: weightbearing per comfort.    * Rest  * Activity modification - avoid activities that aggravate symptoms.  * NSAIDS - regular use for inflammation, with food, as long as no  contra-indications. Please discuss with primary care provider  if needed.  * Ice twice daily to three times daily as needed.  * Compression wrap as needed.  * Elevation of extremity to reduce swelling as needed.  * Injections: risks and perceived benefits discussed today. Patient elects to proceed with bilateral knee injections today  * Tylenol as needed for pain  * return to clinic as needed      Osvaldo Mccoy MD  Dept. of Orthopaedic Surgery  [facility]      Large Joint Injection/Arthocentesis: bilateral knee    Date/Time: 5/2/2022 3:07 PM  Performed by: Van Napoles PA  Authorized by: Osvaldo Mccoy MD     Indications:  Pain and osteoarthritis  Needle Size:  22 G  Guidance: landmark guided    Approach:  Anteromedial  Location:  Knee  Laterality:  Bilateral      Medications (Right):  80 mg methylPREDNISolone 80 MG/ML; 4 mL bupivacaine 0.25 %  Medications (Left):  80 mg methylPREDNISolone 80 MG/ML; 4 mL bupivacaine 0.25 %  Outcome:  Tolerated well, no immediate complications  Procedure discussed: discussed risks, benefits, and alternatives    Consent Given by:  Patient  Prep: patient was prepped and draped in usual sterile fashion

## 2022-05-02 ENCOUNTER — OFFICE VISIT (OUTPATIENT)
Dept: ORTHOPEDICS | Facility: CLINIC | Age: 47
End: 2022-05-02
Payer: COMMERCIAL

## 2022-05-02 VITALS
WEIGHT: 229 LBS | BODY MASS INDEX: 30.35 KG/M2 | DIASTOLIC BLOOD PRESSURE: 84 MMHG | HEART RATE: 60 BPM | HEIGHT: 73 IN | SYSTOLIC BLOOD PRESSURE: 167 MMHG

## 2022-05-02 DIAGNOSIS — M17.0 BILATERAL PRIMARY OSTEOARTHRITIS OF KNEE: Primary | ICD-10-CM

## 2022-05-02 PROCEDURE — 20610 DRAIN/INJ JOINT/BURSA W/O US: CPT | Mod: 50 | Performed by: ORTHOPAEDIC SURGERY

## 2022-05-02 RX ORDER — METHYLPREDNISOLONE ACETATE 80 MG/ML
80 INJECTION, SUSPENSION INTRA-ARTICULAR; INTRALESIONAL; INTRAMUSCULAR; SOFT TISSUE
Status: DISCONTINUED | OUTPATIENT
Start: 2022-05-02 | End: 2022-08-03

## 2022-05-02 RX ORDER — BUPIVACAINE HYDROCHLORIDE 2.5 MG/ML
4 INJECTION, SOLUTION INFILTRATION; PERINEURAL
Status: DISCONTINUED | OUTPATIENT
Start: 2022-05-02 | End: 2022-08-03

## 2022-05-02 RX ADMIN — BUPIVACAINE HYDROCHLORIDE 4 ML: 2.5 INJECTION, SOLUTION INFILTRATION; PERINEURAL at 15:07

## 2022-05-02 RX ADMIN — METHYLPREDNISOLONE ACETATE 80 MG: 80 INJECTION, SUSPENSION INTRA-ARTICULAR; INTRALESIONAL; INTRAMUSCULAR; SOFT TISSUE at 15:07

## 2022-05-02 ASSESSMENT — PAIN SCALES - GENERAL: PAINLEVEL: SEVERE PAIN (6)

## 2022-05-02 NOTE — LETTER
5/2/2022         RE: Ayo Hernández  [address] [facility] [address]        Dear Colleague,    Thank you for referring your patient, Ayo Hernández, to the [facility]. Please see a copy of my visit note below.    Chief Complaint   Patient presents with     Knee Pain     Bilateral knee pain. Last injections: 1/24/22. Injections work great. He would like repeat injections today. Right knee is worse.        HISTORY OF PRESENT ILLNESS:  Ayo Hernánedz is a 46 year old male seen for chronic bilateral knee pain, advanced primary osteoarthritis.  He returns today with pain, right more than left  today. Last injections bilateral 1/24/2022, work well.    Some days hardly any pain and other days more severe pain.    Just returned from a trip to [place], weather was nice.      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      No past medical history on file.    Past Surgical History:   Procedure Laterality Date     ARTHROSCOPY KNEE Right 7/15/2016    Procedure: ARTHROSCOPY KNEE;  Surgeon: Osvaldo Mccoy MD;  Location:  OR     ORTHOPEDIC SURGERY  08.2012    left knee, arthroscopy     ORTHOPEDIC SURGERY  07.15.2016    right knee       Medications:   Current Outpatient Medications:      amLODIPine (NORVASC) 10 MG tablet, TAKE 1 TABLET BY MOUTH ONCE DAILY, Disp: 90 tablet, Rfl: 0     ibuprofen (ADVIL,MOTRIN) 200 MG tablet, Take 200 mg by mouth every 4 hours as needed., Disp: , Rfl:      lisinopril (ZESTRIL) 5 MG tablet, Take 1 tablet (5 mg) by mouth daily, Disp: 90 tablet, Rfl: 0     triamcinolone (KENALOG) 0.1 % external cream, Apply topically 2 times daily as needed for irritation For up to 10 days as needed for rash, Disp: 30 g, Rfl: 0    Allergies: No Known Allergies       REVIEW OF SYSTEMS:   CONSTITUTIONAL:NEGATIVE for fever, chills, night  Controlled with current regime "sweats  INTEGUMENTARY/SKIN: NEGATIVE for worrisome wound problems or redness.  MUSCULOSKELETAL:See HPI above  NEURO: NEGATIVE for weakness, dizziness or paresthesias      PHYSICAL EXAM:  BP (!) 167/84   Pulse 60   Ht 1.854 m (6' 1\")   Wt 103.9 kg (229 lb)   BMI 30.21 kg/m     GENERAL APPEARANCE: healthy, alert, no distress  SKIN: no suspicious lesions or rashes  NEURO: Normal strength and tone, mentation intact and speech normal  PSYCH:  mentation appears normal and affect normal, not anxious  RESPIRATORY: No increased work of breathing.    Gait: favors the left   Alignment: varus    No Quad atrophy, strength normal.  Intact sensation deep peroneal nerve, superficial peroneal nerve, med/lat tibial nerve, sural nerve, saphenous nerve  Intact EHL, EDL, TA, FHL, GS, quadriceps hamstrings and hip flexors  Toes warm and well perfused, brisk capillary refill. Palpable 2+ dp pulses.  calf soft and nttp or squeeze.  Edema: none    right  KNEE EXAM:    Skin: intact, no ecchymosis.  ROM: 4 degrees extension to 90 degrees flexion; limited by discomfort.  Effusion: trace  Tender: medial joint line.   Positive crepitus.  Palpable marginal osteophytes    Left knee exam:  Skin intact. No erythema or ecchymosis.  ROM: 3 extension to 100 flexion  Effusion: trace  Tender to palpation medial joint line, lateral joint line  Positive medial and patello-femoral crepitus.  Palpable marginal osteophytes.      X-RAY: no new images today.    3 views bilateral knees taken on 2/5/2018    Left: Advanced lateral compartment degenerative changes similar to the prior study, bone on bone with articular flattening, subchondral sclerosis. Moderate patellofemoral degenerative changes with large marginal osteophytes.     Right: Moderate patellofemoral degenerative changes, prominent marginal osteophyte. Marginal osteophyte formation in the lateral compartment without significant joint space loss.         Impression: Ayo Hernández is a 46 year old " male with chronic bilateral knee pain, advanced primary osteoarthritis.    Plan:   * WB status: weightbearing per comfort.    * Rest  * Activity modification - avoid activities that aggravate symptoms.  * NSAIDS - regular use for inflammation, with food, as long as no contra-indications. Please discuss with primary care provider  if needed.  * Ice twice daily to three times daily as needed.  * Compression wrap as needed.  * Elevation of extremity to reduce swelling as needed.  * Injections: risks and perceived benefits discussed today. Patient elects to proceed with bilateral knee injections today  * Tylenol as needed for pain  * return to clinic as needed      Osvaldo Mccoy MD  Dept. of Orthopaedic Surgery  [facility]      Large Joint Injection/Arthocentesis: bilateral knee    Date/Time: 5/2/2022 3:07 PM  Performed by: Van Napoles PA  Authorized by: Osvaldo Mccoy MD     Indications:  Pain and osteoarthritis  Needle Size:  22 G  Guidance: landmark guided    Approach:  Anteromedial  Location:  Knee  Laterality:  Bilateral      Medications (Right):  80 mg methylPREDNISolone 80 MG/ML; 4 mL bupivacaine 0.25 %  Medications (Left):  80 mg methylPREDNISolone 80 MG/ML; 4 mL bupivacaine 0.25 %  Outcome:  Tolerated well, no immediate complications  Procedure discussed: discussed risks, benefits, and alternatives    Consent Given by:  Patient  Prep: patient was prepped and draped in usual sterile fashion              Again, thank you for allowing me to participate in the care of your patient.        Sincerely,        Osvaldo Mccoy MD

## 2022-05-08 ENCOUNTER — HEALTH MAINTENANCE LETTER (OUTPATIENT)
Age: 47
End: 2022-05-08

## 2022-05-09 ENCOUNTER — MYC REFILL (OUTPATIENT)
Dept: FAMILY MEDICINE | Facility: CLINIC | Age: 47
End: 2022-05-09
Payer: COMMERCIAL

## 2022-05-09 DIAGNOSIS — I10 HYPERTENSION GOAL BP (BLOOD PRESSURE) < 140/90: ICD-10-CM

## 2022-05-09 NOTE — LETTER
May 10, 2022    Ayo Hernández  [address] [facility] [address]    Dear Ayo,       We recently received a refill request for lisinopril (ZESTRIL) 5 MG tablet.  We have refilled this for a one time 90 day supply only because you are due for a:    Blood Pressure check office visit and Pre-Visit Fasting lab appointment      Please schedule this lab appointment 4-5 days prior to the office visit.     Please call at your earliest convenience so that there will not be a delay with your future refills.          Thank you,   Your [facility] Team/AL  [phone]

## 2022-05-10 RX ORDER — LISINOPRIL 5 MG/1
5 TABLET ORAL DAILY
Qty: 90 TABLET | Refills: 0 | Status: SHIPPED | OUTPATIENT
Start: 2022-05-10 | End: 2023-02-07

## 2022-05-10 NOTE — TELEPHONE ENCOUNTER
Routing refill request to provider for review/approval because:  BP Readings from Last 3 Encounters:   05/02/22 (!) 167/84   01/24/22 130/80   10/29/21 137/88     Rosalee Montes BSN, RN

## 2022-05-10 NOTE — TELEPHONE ENCOUNTER
Letter mailed to schedule office visit and pre visit fasting labs before next med refill    Toribio Harris

## 2022-05-10 NOTE — TELEPHONE ENCOUNTER
Please help set-up appointment with previsit fasitng labs in next couple months. Juan Pablo Kimbrough MD

## 2022-06-16 DIAGNOSIS — I10 HYPERTENSION GOAL BP (BLOOD PRESSURE) < 140/90: ICD-10-CM

## 2022-06-16 RX ORDER — AMLODIPINE BESYLATE 10 MG/1
10 TABLET ORAL DAILY
Qty: 90 TABLET | Refills: 0 | Status: SHIPPED | OUTPATIENT
Start: 2022-06-16 | End: 2022-09-12

## 2022-06-16 NOTE — TELEPHONE ENCOUNTER
"Requested Prescriptions   Pending Prescriptions Disp Refills    amLODIPine (NORVASC) 10 MG tablet 90 tablet 0     Sig: Take 1 tablet (10 mg) by mouth daily        Calcium Channel Blockers Protocol  Failed - 6/16/2022  9:26 AM        Failed - Blood pressure under 140/90 in past 12 months       BP Readings from Last 3 Encounters:   05/02/22 (!) 167/84   01/24/22 130/80   10/29/21 137/88                 Passed - Recent (12 mo) or future (30 days) visit within the authorizing provider's specialty     Patient has had an office visit with the authorizing provider or a provider within the authorizing providers department within the previous 12 mos or has a future within next 30 days. See \"Patient Info\" tab in inbasket, or \"Choose Columns\" in Meds & Orders section of the refill encounter.              Passed - Medication is active on med list        Passed - Patient is age 18 or older        Passed - Normal serum creatinine on file in past 12 months     Recent Labs   Lab Test 07/09/21  1002   CR 0.88       Ok to refill medication if creatinine is low                "

## 2022-06-16 NOTE — LETTER
June 16, 2022    Ayo Hernández  [address] [facility] [address]    Dear Ayo,       We recently received a refill request for amLODIPine (NORVASC) 10 MG tablet.  We have refilled this for a one time 90 day supply only because you are due for a:    Blood pressure/medication check office visit      Please call at your earliest convenience so that there will not be a delay with your future refills.          Thank you,   Your [facility] Team/  [phone]

## 2022-08-02 NOTE — PROGRESS NOTES
"Chief Complaint   Patient presents with     Knee Pain     Bilateral knee pain. Last injections: depo 5/2/22. Last injections worked ok. He can tell the injections worked Right knee is worse today.        HISTORY OF PRESENT ILLNESS:  Ayo Hernández is a 47 year old male seen for chronic bilateral knee pain, advanced primary osteoarthritis.  He returns today with pain, right more than left  today. Last injections bilateral 5/2/2022 (depomedrol), work well. He'd like repeat injections today.    Some days hardly any pain and other days more severe pain.    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      No past medical history on file.    Past Surgical History:   Procedure Laterality Date     ARTHROSCOPY KNEE Right 7/15/2016    Procedure: ARTHROSCOPY KNEE;  Surgeon: Osvaldo Mccoy MD;  Location:  OR     ORTHOPEDIC SURGERY  08.2012    left knee, arthroscopy     ORTHOPEDIC SURGERY  07.15.2016    right knee       Medications:   Current Outpatient Medications:      amLODIPine (NORVASC) 10 MG tablet, Take 1 tablet (10 mg) by mouth daily, Disp: 90 tablet, Rfl: 0     ibuprofen (ADVIL,MOTRIN) 200 MG tablet, Take 200 mg by mouth every 4 hours as needed., Disp: , Rfl:      lisinopril (ZESTRIL) 5 MG tablet, Take 1 tablet (5 mg) by mouth daily, Disp: 90 tablet, Rfl: 0     triamcinolone (KENALOG) 0.1 % external cream, Apply topically 2 times daily as needed for irritation For up to 10 days as needed for rash, Disp: 30 g, Rfl: 0    Allergies: No Known Allergies       REVIEW OF SYSTEMS:   CONSTITUTIONAL:NEGATIVE for fever, chills, night sweats  INTEGUMENTARY/SKIN: NEGATIVE for worrisome wound problems or redness.  MUSCULOSKELETAL:See HPI above  NEURO: NEGATIVE for weakness, dizziness or paresthesias      PHYSICAL EXAM:  /79   Pulse 63   Ht 1.854 m (6' 1\")   Wt 100.7 kg (222 lb)   BMI 29.29 " kg/m     GENERAL APPEARANCE: healthy, alert, no distress  SKIN: no suspicious lesions or rashes  NEURO: Normal strength and tone, mentation intact and speech normal  PSYCH:  mentation appears normal and affect normal, not anxious  RESPIRATORY: No increased work of breathing.    Gait: favors the left   Alignment: varus    No Quad atrophy, strength normal.  Intact sensation deep peroneal nerve, superficial peroneal nerve, med/lat tibial nerve, sural nerve, saphenous nerve  Intact EHL, EDL, TA, FHL, GS, quadriceps hamstrings and hip flexors  Toes warm and well perfused, brisk capillary refill. Palpable 2+ dp pulses.  calf soft and nttp or squeeze.  Edema: none    right  KNEE EXAM:    Skin: intact, no ecchymosis.  ROM: 4 degrees extension to 90 degrees flexion; limited by discomfort.  Effusion: trace  Tender: medial joint line.   Positive crepitus.  Palpable marginal osteophytes    Left knee exam:  Skin intact. No erythema or ecchymosis.  ROM: 3 extension to 100 flexion  Effusion: trace  Tender to palpation medial joint line, lateral joint line  Positive medial and patello-femoral crepitus.  Palpable marginal osteophytes.      X-RAY: no new images today.    3 views bilateral knees taken on 2/5/2018    Left: Advanced lateral compartment degenerative changes similar to the prior study, bone on bone with articular flattening, subchondral sclerosis. Moderate patellofemoral degenerative changes with large marginal osteophytes.     Right: Moderate patellofemoral degenerative changes, prominent marginal osteophyte. Marginal osteophyte formation in the lateral compartment without significant joint space loss.         Impression: Ayo Hernández is a 47 year old male with chronic bilateral knee pain, advanced primary osteoarthritis.    Plan:   * WB status: weightbearing per comfort.    * Rest  * Activity modification - avoid activities that aggravate symptoms.  * NSAIDS - regular use for inflammation, with food, as long as no  contra-indications. Please discuss with primary care provider  if needed.  * Ice twice daily to three times daily as needed.  * Compression wrap as needed.  * Elevation of extremity to reduce swelling as needed.  * Injections: risks and perceived benefits discussed today. Patient elects to proceed with bilateral knee injections today  * Tylenol as needed for pain  * return to clinic as needed  * recommend updated bilateral knee xrays at next visit just to monitor osteoarthritis progression.      Osvaldo Mccoy MD  Dept. of Orthopaedic Surgery  [facility]      Large Joint Injection/Arthocentesis: bilateral knee    Date/Time: 8/3/2022 4:06 PM  Performed by: Osvaldo Mccoy MD  Authorized by: Osvaldo Mccoy MD     Indications:  Pain  Needle Size:  22 G  Guidance: landmark guided    Approach:  Anteromedial  Location:  Knee  Laterality:  Bilateral      Medications (Right):  80 mg methylPREDNISolone 80 MG/ML; 4 mL bupivacaine 0.25 %  Medications (Left):  80 mg methylPREDNISolone 80 MG/ML; 4 mL bupivacaine 0.25 %  Outcome:  Tolerated well, no immediate complications  Procedure discussed: discussed risks, benefits, and alternatives    Consent Given by:  Patient  Timeout: timeout called immediately prior to procedure    Prep: patient was prepped and draped in usual sterile fashion

## 2022-08-03 ENCOUNTER — OFFICE VISIT (OUTPATIENT)
Dept: ORTHOPEDICS | Facility: CLINIC | Age: 47
End: 2022-08-03
Payer: COMMERCIAL

## 2022-08-03 VITALS
HEIGHT: 73 IN | DIASTOLIC BLOOD PRESSURE: 79 MMHG | BODY MASS INDEX: 29.42 KG/M2 | WEIGHT: 222 LBS | SYSTOLIC BLOOD PRESSURE: 136 MMHG | HEART RATE: 63 BPM

## 2022-08-03 DIAGNOSIS — M17.0 PRIMARY OSTEOARTHRITIS OF BOTH KNEES: Primary | ICD-10-CM

## 2022-08-03 PROCEDURE — 20610 DRAIN/INJ JOINT/BURSA W/O US: CPT | Mod: 50 | Performed by: ORTHOPAEDIC SURGERY

## 2022-08-03 RX ADMIN — METHYLPREDNISOLONE ACETATE 80 MG: 80 INJECTION, SUSPENSION INTRA-ARTICULAR; INTRALESIONAL; INTRAMUSCULAR; SOFT TISSUE at 16:06

## 2022-08-03 RX ADMIN — BUPIVACAINE HYDROCHLORIDE 4 ML: 2.5 INJECTION, SOLUTION INFILTRATION; PERINEURAL at 16:06

## 2022-08-03 ASSESSMENT — PAIN SCALES - GENERAL: PAINLEVEL: MILD PAIN (3)

## 2022-08-03 NOTE — LETTER
8/3/2022         RE: Ayo Hernández  [address] [facility] [address]        Dear Colleague,    Thank you for referring your patient, Ayo Hernández, to the [facility]. Please see a copy of my visit note below.    Chief Complaint   Patient presents with     Knee Pain     Bilateral knee pain. Last injections: depo 5/2/22. Last injections worked ok. He can tell the injections worked Right knee is worse today.        HISTORY OF PRESENT ILLNESS:  Ayo Hernández is a 47 year old male seen for chronic bilateral knee pain, advanced primary osteoarthritis.  He returns today with pain, right more than left  today. Last injections bilateral 5/2/2022 (depomedrol), work well. He'd like repeat injections today.    Some days hardly any pain and other days more severe pain.    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      No past medical history on file.    Past Surgical History:   Procedure Laterality Date     ARTHROSCOPY KNEE Right 7/15/2016    Procedure: ARTHROSCOPY KNEE;  Surgeon: Osvaldo Mccoy MD;  Location:  OR     ORTHOPEDIC SURGERY  08.2012    left knee, arthroscopy     ORTHOPEDIC SURGERY  07.15.2016    right knee       Medications:   Current Outpatient Medications:      amLODIPine (NORVASC) 10 MG tablet, Take 1 tablet (10 mg) by mouth daily, Disp: 90 tablet, Rfl: 0     ibuprofen (ADVIL,MOTRIN) 200 MG tablet, Take 200 mg by mouth every 4 hours as needed., Disp: , Rfl:      lisinopril (ZESTRIL) 5 MG tablet, Take 1 tablet (5 mg) by mouth daily, Disp: 90 tablet, Rfl: 0     triamcinolone (KENALOG) 0.1 % external cream, Apply topically 2 times daily as needed for irritation For up to 10 days as needed for rash, Disp: 30 g, Rfl: 0    Allergies: No Known Allergies       REVIEW OF SYSTEMS:   CONSTITUTIONAL:NEGATIVE for fever, chills, night  "sweats  INTEGUMENTARY/SKIN: NEGATIVE for worrisome wound problems or redness.  MUSCULOSKELETAL:See HPI above  NEURO: NEGATIVE for weakness, dizziness or paresthesias      PHYSICAL EXAM:  /79   Pulse 63   Ht 1.854 m (6' 1\")   Wt 100.7 kg (222 lb)   BMI 29.29 kg/m     GENERAL APPEARANCE: healthy, alert, no distress  SKIN: no suspicious lesions or rashes  NEURO: Normal strength and tone, mentation intact and speech normal  PSYCH:  mentation appears normal and affect normal, not anxious  RESPIRATORY: No increased work of breathing.    Gait: favors the left   Alignment: varus    No Quad atrophy, strength normal.  Intact sensation deep peroneal nerve, superficial peroneal nerve, med/lat tibial nerve, sural nerve, saphenous nerve  Intact EHL, EDL, TA, FHL, GS, quadriceps hamstrings and hip flexors  Toes warm and well perfused, brisk capillary refill. Palpable 2+ dp pulses.  calf soft and nttp or squeeze.  Edema: none    right  KNEE EXAM:    Skin: intact, no ecchymosis.  ROM: 4 degrees extension to 90 degrees flexion; limited by discomfort.  Effusion: trace  Tender: medial joint line.   Positive crepitus.  Palpable marginal osteophytes    Left knee exam:  Skin intact. No erythema or ecchymosis.  ROM: 3 extension to 100 flexion  Effusion: trace  Tender to palpation medial joint line, lateral joint line  Positive medial and patello-femoral crepitus.  Palpable marginal osteophytes.      X-RAY: no new images today.    3 views bilateral knees taken on 2/5/2018    Left: Advanced lateral compartment degenerative changes similar to the prior study, bone on bone with articular flattening, subchondral sclerosis. Moderate patellofemoral degenerative changes with large marginal osteophytes.     Right: Moderate patellofemoral degenerative changes, prominent marginal osteophyte. Marginal osteophyte formation in the lateral compartment without significant joint space loss.         Impression: Ayo Hernández is a 47 year old male " with chronic bilateral knee pain, advanced primary osteoarthritis.    Plan:   * WB status: weightbearing per comfort.    * Rest  * Activity modification - avoid activities that aggravate symptoms.  * NSAIDS - regular use for inflammation, with food, as long as no contra-indications. Please discuss with primary care provider  if needed.  * Ice twice daily to three times daily as needed.  * Compression wrap as needed.  * Elevation of extremity to reduce swelling as needed.  * Injections: risks and perceived benefits discussed today. Patient elects to proceed with bilateral knee injections today  * Tylenol as needed for pain  * return to clinic as needed  * recommend updated bilateral knee xrays at next visit just to monitor osteoarthritis progression.      Osvaldo Mccoy MD  Dept. of Orthopaedic Surgery  [facility]      Large Joint Injection/Arthocentesis: bilateral knee    Date/Time: 8/3/2022 4:06 PM  Performed by: Osvaldo Mccoy MD  Authorized by: Osvaldo Mccoy MD     Indications:  Pain  Needle Size:  22 G  Guidance: landmark guided    Approach:  Anteromedial  Location:  Knee  Laterality:  Bilateral      Medications (Right):  80 mg methylPREDNISolone 80 MG/ML; 4 mL bupivacaine 0.25 %  Medications (Left):  80 mg methylPREDNISolone 80 MG/ML; 4 mL bupivacaine 0.25 %  Outcome:  Tolerated well, no immediate complications  Procedure discussed: discussed risks, benefits, and alternatives    Consent Given by:  Patient  Timeout: timeout called immediately prior to procedure    Prep: patient was prepped and draped in usual sterile fashion              Again, thank you for allowing me to participate in the care of your patient.        Sincerely,        Osvaldo Mccoy MD

## 2022-08-04 RX ORDER — METHYLPREDNISOLONE ACETATE 80 MG/ML
80 INJECTION, SUSPENSION INTRA-ARTICULAR; INTRALESIONAL; INTRAMUSCULAR; SOFT TISSUE
Status: DISCONTINUED | OUTPATIENT
Start: 2022-08-03 | End: 2022-10-13

## 2022-08-04 RX ORDER — BUPIVACAINE HYDROCHLORIDE 2.5 MG/ML
4 INJECTION, SOLUTION INFILTRATION; PERINEURAL
Status: DISCONTINUED | OUTPATIENT
Start: 2022-08-03 | End: 2022-10-13

## 2022-09-12 DIAGNOSIS — I10 HYPERTENSION GOAL BP (BLOOD PRESSURE) < 140/90: ICD-10-CM

## 2022-09-12 RX ORDER — AMLODIPINE BESYLATE 10 MG/1
10 TABLET ORAL DAILY
Qty: 90 TABLET | Refills: 0 | Status: SHIPPED | OUTPATIENT
Start: 2022-09-12 | End: 2023-01-09

## 2022-09-12 NOTE — LETTER
September 12, 2022    Ayo Hernández  [address] [facility] [address]    Dear Ayo,       We recently received a refill request for amLODIPine (NORVASC) 10 MG tablet.  We have refilled this for a one time 90 day supply only because you are due for a:    Medication follow up office visit in the next couple of months.      Please call at your earliest convenience so that there will not be a delay with your future refills.          Thank you,   Your [facility] Team/  [phone]

## 2022-09-14 DIAGNOSIS — I10 HYPERTENSION GOAL BP (BLOOD PRESSURE) < 140/90: ICD-10-CM

## 2022-09-14 RX ORDER — AMLODIPINE BESYLATE 10 MG/1
TABLET ORAL
Qty: 90 TABLET | Refills: 0 | OUTPATIENT
Start: 2022-09-14

## 2022-10-13 ENCOUNTER — OFFICE VISIT (OUTPATIENT)
Dept: ORTHOPEDICS | Facility: CLINIC | Age: 47
End: 2022-10-13
Payer: COMMERCIAL

## 2022-10-13 VITALS
HEART RATE: 56 BPM | DIASTOLIC BLOOD PRESSURE: 88 MMHG | HEIGHT: 73 IN | BODY MASS INDEX: 29.69 KG/M2 | WEIGHT: 224 LBS | SYSTOLIC BLOOD PRESSURE: 162 MMHG

## 2022-10-13 DIAGNOSIS — M25.461 EFFUSION, RIGHT KNEE: ICD-10-CM

## 2022-10-13 DIAGNOSIS — M17.0 PRIMARY OSTEOARTHRITIS OF BOTH KNEES: Primary | ICD-10-CM

## 2022-10-13 PROCEDURE — 20610 DRAIN/INJ JOINT/BURSA W/O US: CPT | Mod: RT | Performed by: ORTHOPAEDIC SURGERY

## 2022-10-13 RX ORDER — BUPIVACAINE HYDROCHLORIDE 2.5 MG/ML
4 INJECTION, SOLUTION INFILTRATION; PERINEURAL
Status: DISCONTINUED | OUTPATIENT
Start: 2022-10-13 | End: 2022-11-09

## 2022-10-13 RX ORDER — METHYLPREDNISOLONE ACETATE 80 MG/ML
80 INJECTION, SUSPENSION INTRA-ARTICULAR; INTRALESIONAL; INTRAMUSCULAR; SOFT TISSUE
Status: DISCONTINUED | OUTPATIENT
Start: 2022-10-13 | End: 2022-11-09

## 2022-10-13 RX ADMIN — BUPIVACAINE HYDROCHLORIDE 4 ML: 2.5 INJECTION, SOLUTION INFILTRATION; PERINEURAL at 16:59

## 2022-10-13 RX ADMIN — METHYLPREDNISOLONE ACETATE 80 MG: 80 INJECTION, SUSPENSION INTRA-ARTICULAR; INTRALESIONAL; INTRAMUSCULAR; SOFT TISSUE at 16:59

## 2022-10-13 ASSESSMENT — PAIN SCALES - GENERAL: PAINLEVEL: MODERATE PAIN (4)

## 2022-10-13 NOTE — PROGRESS NOTES
Chief Complaint   Patient presents with     Knee Pain     Bilateral knee pain. Last injections: 8/3/22. Injections work good but isn't as effective in the right as it has been. He has some fluid in the right knee that he would like to have aspirated.        HISTORY OF PRESENT ILLNESS:  Ayo Hernández is a 47 year old male seen for chronic bilateral knee pain, advanced primary osteoarthritis.  He returns today with pain, swelling in the right knee. He's wondering about draining the right knee. Felt something happen at work, since then more pain, some swelling. Left knee is doing fine, no issues yet.    Last injections bilateral 8/3/2022 (depomedrol).    Some days hardly any pain and other days more severe pain.    Pain 4/10.    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      No past medical history on file.    Past Surgical History:   Procedure Laterality Date     ARTHROSCOPY KNEE Right 7/15/2016    Procedure: ARTHROSCOPY KNEE;  Surgeon: Osvaldo Mccoy MD;  Location:  OR     ORTHOPEDIC SURGERY  08.2012    left knee, arthroscopy     ORTHOPEDIC SURGERY  07.15.2016    right knee       Medications:   Current Outpatient Medications:      amLODIPine (NORVASC) 10 MG tablet, TAKE 1 TABLET (10 MG) BY MOUTH DAILY, Disp: 90 tablet, Rfl: 0     ibuprofen (ADVIL,MOTRIN) 200 MG tablet, Take 200 mg by mouth every 4 hours as needed., Disp: , Rfl:      lisinopril (ZESTRIL) 5 MG tablet, Take 1 tablet (5 mg) by mouth daily, Disp: 90 tablet, Rfl: 0     triamcinolone (KENALOG) 0.1 % external cream, Apply topically 2 times daily as needed for irritation For up to 10 days as needed for rash, Disp: 30 g, Rfl: 0    Allergies: No Known Allergies       REVIEW OF SYSTEMS:   CONSTITUTIONAL:NEGATIVE for fever, chills, night sweats  INTEGUMENTARY/SKIN: NEGATIVE for worrisome wound problems or redness.  MUSCULOSKELETAL:See HPI above  NEURO:  "NEGATIVE for weakness, dizziness or paresthesias      PHYSICAL EXAM:  BP (!) 162/88   Pulse 56   Ht 1.854 m (6' 1\")   Wt 101.6 kg (224 lb)   BMI 29.55 kg/m     GENERAL APPEARANCE: healthy, alert, no distress  SKIN: no suspicious lesions or rashes  NEURO: Normal strength and tone, mentation intact and speech normal  PSYCH:  mentation appears normal and affect normal, not anxious  RESPIRATORY: No increased work of breathing.    Gait: favors the right   Alignment: valgus    No Quad atrophy, strength normal.  Intact sensation deep peroneal nerve, superficial peroneal nerve, med/lat tibial nerve, sural nerve, saphenous nerve  Intact EHL, EDL, TA, FHL, GS, quadriceps hamstrings and hip flexors  Toes warm and well perfused, brisk capillary refill. Palpable 2+ dp pulses.  calf soft and nttp or squeeze.  Edema: none    right  KNEE EXAM:    Skin: intact, no ecchymosis.  ROM: 4 degrees extension to 90 degrees flexion; limited by discomfort.  Effusion: small  Tender: medial joint line.   Positive crepitus.  Palpable marginal osteophytes    Left knee exam:  Skin intact. No erythema or ecchymosis.  ROM: 3 extension to 100 flexion  Effusion: trace  Tender to palpation medial joint line, lateral joint line  Positive medial and patello-femoral crepitus.  Palpable marginal osteophytes.      X-RAY: no new images today.    3 views bilateral knees taken on 2/5/2018    Left: Advanced lateral compartment degenerative changes similar to the prior study, bone on bone with articular flattening, subchondral sclerosis. Moderate patellofemoral degenerative changes with large marginal osteophytes.     Right: Moderate patellofemoral degenerative changes, prominent marginal osteophyte. Marginal osteophyte formation in the lateral compartment without significant joint space loss.         Impression: Ayo Hernández is a 47 year old male with chronic bilateral knee pain, advanced primary osteoarthritis. Right knee effusion.    Plan:   * WB " status: weightbearing per comfort.    * Rest  * Activity modification - avoid activities that aggravate symptoms.  * NSAIDS - regular use for inflammation, with food, as long as no contra-indications. Please discuss with primary care provider  if needed.  * Ice twice daily to three times daily as needed.  * Compression wrap as needed.  * Elevation of extremity to reduce swelling as needed.  * Injections: risks and perceived benefits discussed today. Patient elects to proceed with only right knee aspiration/injection today.  * Tylenol as needed for pain  * return to clinic as needed  * recommend updated bilateral knee xrays at next visit just to monitor osteoarthritis progression.      Osvaldo Mccoy MD  Dept. of Orthopaedic Surgery  [facility]      Large Joint Injection/Arthocentesis: R knee joint    Date/Time: 10/13/2022 4:59 PM  Performed by: Van Napoles PA  Authorized by: Osvaldo Mccoy MD     Indications:  Joint swelling, osteoarthritis and pain  Needle Size:  18 G  Guidance: landmark guided    Approach:  Superolateral  Location:  Knee      Medications:  80 mg methylPREDNISolone 80 MG/ML; 4 mL bupivacaine 0.25 %  Aspirate amount (mL):  17  Aspirate:  Yellow  Outcome:  Tolerated well, no immediate complications  Procedure discussed: discussed risks, benefits, and alternatives    Consent Given by:  Patient  Prep: patient was prepped and draped in usual sterile fashion

## 2022-10-13 NOTE — LETTER
10/13/2022         RE: Ayo Hernández  [address] [facility] [address]        Dear Colleague,    Thank you for referring your patient, Ayo Hernández, to the [facility]. Please see a copy of my visit note below.    Chief Complaint   Patient presents with     Knee Pain     Bilateral knee pain. Last injections: 8/3/22. Injections work good but isn't as effective in the right as it has been. He has some fluid in the right knee that he would like to have aspirated.        HISTORY OF PRESENT ILLNESS:  Ayo Hernández is a 47 year old male seen for chronic bilateral knee pain, advanced primary osteoarthritis.  He returns today with pain, swelling in the right knee. He's wondering about draining the right knee. Felt something happen at work, since then more pain, some swelling. Left knee is doing fine, no issues yet.    Last injections bilateral 8/3/2022 (depomedrol).    Some days hardly any pain and other days more severe pain.    Pain 4/10.    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      No past medical history on file.    Past Surgical History:   Procedure Laterality Date     ARTHROSCOPY KNEE Right 7/15/2016    Procedure: ARTHROSCOPY KNEE;  Surgeon: Osvaldo Mccoy MD;  Location:  OR     ORTHOPEDIC SURGERY  08.2012    left knee, arthroscopy     ORTHOPEDIC SURGERY  07.15.2016    right knee       Medications:   Current Outpatient Medications:      amLODIPine (NORVASC) 10 MG tablet, TAKE 1 TABLET (10 MG) BY MOUTH DAILY, Disp: 90 tablet, Rfl: 0     ibuprofen (ADVIL,MOTRIN) 200 MG tablet, Take 200 mg by mouth every 4 hours as needed., Disp: , Rfl:      lisinopril (ZESTRIL) 5 MG tablet, Take 1 tablet (5 mg) by mouth daily, Disp: 90 tablet, Rfl: 0     triamcinolone (KENALOG) 0.1 % external cream, Apply topically 2 times daily as needed for irritation For up to 10 days as  "needed for rash, Disp: 30 g, Rfl: 0    Allergies: No Known Allergies       REVIEW OF SYSTEMS:   CONSTITUTIONAL:NEGATIVE for fever, chills, night sweats  INTEGUMENTARY/SKIN: NEGATIVE for worrisome wound problems or redness.  MUSCULOSKELETAL:See HPI above  NEURO: NEGATIVE for weakness, dizziness or paresthesias      PHYSICAL EXAM:  BP (!) 162/88   Pulse 56   Ht 1.854 m (6' 1\")   Wt 101.6 kg (224 lb)   BMI 29.55 kg/m     GENERAL APPEARANCE: healthy, alert, no distress  SKIN: no suspicious lesions or rashes  NEURO: Normal strength and tone, mentation intact and speech normal  PSYCH:  mentation appears normal and affect normal, not anxious  RESPIRATORY: No increased work of breathing.    Gait: favors the right   Alignment: valgus    No Quad atrophy, strength normal.  Intact sensation deep peroneal nerve, superficial peroneal nerve, med/lat tibial nerve, sural nerve, saphenous nerve  Intact EHL, EDL, TA, FHL, GS, quadriceps hamstrings and hip flexors  Toes warm and well perfused, brisk capillary refill. Palpable 2+ dp pulses.  calf soft and nttp or squeeze.  Edema: none    right  KNEE EXAM:    Skin: intact, no ecchymosis.  ROM: 4 degrees extension to 90 degrees flexion; limited by discomfort.  Effusion: small  Tender: medial joint line.   Positive crepitus.  Palpable marginal osteophytes    Left knee exam:  Skin intact. No erythema or ecchymosis.  ROM: 3 extension to 100 flexion  Effusion: trace  Tender to palpation medial joint line, lateral joint line  Positive medial and patello-femoral crepitus.  Palpable marginal osteophytes.      X-RAY: no new images today.    3 views bilateral knees taken on 2/5/2018    Left: Advanced lateral compartment degenerative changes similar to the prior study, bone on bone with articular flattening, subchondral sclerosis. Moderate patellofemoral degenerative changes with large marginal osteophytes.     Right: Moderate patellofemoral degenerative changes, prominent marginal osteophyte. " Marginal osteophyte formation in the lateral compartment without significant joint space loss.         Impression: Ayo Hernández is a 47 year old male with chronic bilateral knee pain, advanced primary osteoarthritis. Right knee effusion.    Plan:   * WB status: weightbearing per comfort.    * Rest  * Activity modification - avoid activities that aggravate symptoms.  * NSAIDS - regular use for inflammation, with food, as long as no contra-indications. Please discuss with primary care provider  if needed.  * Ice twice daily to three times daily as needed.  * Compression wrap as needed.  * Elevation of extremity to reduce swelling as needed.  * Injections: risks and perceived benefits discussed today. Patient elects to proceed with only right knee aspiration/injection today.  * Tylenol as needed for pain  * return to clinic as needed  * recommend updated bilateral knee xrays at next visit just to monitor osteoarthritis progression.      Osvaldo Mccoy MD  Dept. of Orthopaedic Surgery  [facility]      Large Joint Injection/Arthocentesis: R knee joint    Date/Time: 10/13/2022 4:59 PM  Performed by: Van Napoles PA  Authorized by: Osvaldo Mccoy MD     Indications:  Joint swelling, osteoarthritis and pain  Needle Size:  18 G  Guidance: landmark guided    Approach:  Superolateral  Location:  Knee      Medications:  80 mg methylPREDNISolone 80 MG/ML; 4 mL bupivacaine 0.25 %  Aspirate amount (mL):  17  Aspirate:  Yellow  Outcome:  Tolerated well, no immediate complications  Procedure discussed: discussed risks, benefits, and alternatives    Consent Given by:  Patient  Prep: patient was prepped and draped in usual sterile fashion              Again, thank you for allowing me to participate in the care of your patient.        Sincerely,        Osvaldo Mccoy MD

## 2022-11-08 NOTE — PROGRESS NOTES
Chief Complaint   Patient presents with     Knee Pain     Bilateral knee pain. Last injection: bilateral knee 8/3/22, last aspiration: right knee only on 10/13/22, also had a right knee injection. Aspiration worked for about a day. He can feel fluid in the left knee and feels pressure in the right knee. He would like to have more injections today, possibly drain the left knee.        HISTORY OF PRESENT ILLNESS:  Ayo Hernández is a 47 year old male seen for chronicl knee pain today. Has been followed over the years for bilateral knee pain, advanced primary osteoarthritis.  He returns today with pain, swelling in the left knee. He's wondering about draining the left knee and getting shots in both knees today. Pain worsened after a recent trip, traveling in a plane, etc.    He had right knee aspiration/injection 10/13/2022, bilateral knee injections 8/3/2022 (both with depomedrol).    Some days hardly any pain and other days more severe pain.    Pain 5/10.    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      No past medical history on file.    Past Surgical History:   Procedure Laterality Date     ARTHROSCOPY KNEE Right 7/15/2016    Procedure: ARTHROSCOPY KNEE;  Surgeon: Osvaldo Mccoy MD;  Location:  OR     ORTHOPEDIC SURGERY  08.2012    left knee, arthroscopy     ORTHOPEDIC SURGERY  07.15.2016    right knee       Medications:   Current Outpatient Medications:      amLODIPine (NORVASC) 10 MG tablet, TAKE 1 TABLET (10 MG) BY MOUTH DAILY, Disp: 90 tablet, Rfl: 0     ibuprofen (ADVIL,MOTRIN) 200 MG tablet, Take 200 mg by mouth every 4 hours as needed., Disp: , Rfl:      lisinopril (ZESTRIL) 5 MG tablet, Take 1 tablet (5 mg) by mouth daily, Disp: 90 tablet, Rfl: 0     triamcinolone (KENALOG) 0.1 % external cream, Apply topically 2 times daily as needed for irritation For up to 10 days as needed for rash, Disp: 30 g,  "Rfl: 0    Current Facility-Administered Medications:      bupivacaine (MARCAINE) 0.25 % injection 4 mL, 4 mL, , , Osvaldo Mccoy MD, 4 mL at 10/13/22 1659     methylPREDNISolone (DEPO-MEDROL) injection 80 mg, 80 mg, , , Osvaldo Mccoy MD, 80 mg at 10/13/22 1659    Allergies: No Known Allergies       REVIEW OF SYSTEMS:   CONSTITUTIONAL:NEGATIVE for fever, chills, night sweats  INTEGUMENTARY/SKIN: NEGATIVE for worrisome wound problems or redness.  MUSCULOSKELETAL:See HPI above  NEURO: NEGATIVE for weakness, dizziness or paresthesias      PHYSICAL EXAM:  BP (!) 152/83   Pulse 75   Ht 1.854 m (6' 1\")   Wt 103.9 kg (229 lb)   BMI 30.21 kg/m     GENERAL APPEARANCE: healthy, alert, no distress  SKIN: no suspicious lesions or rashes  NEURO: Normal strength and tone, mentation intact and speech normal  PSYCH:  mentation appears normal and affect normal, not anxious  RESPIRATORY: No increased work of breathing.    Gait: favors the right   Alignment: valgus, quite severe.    mild Quad atrophy, strength normal.  Intact sensation deep peroneal nerve, superficial peroneal nerve, med/lat tibial nerve, sural nerve, saphenous nerve  Intact EHL, EDL, TA, FHL, GS, quadriceps hamstrings and hip flexors  Toes warm and well perfused, brisk capillary refill. Palpable 2+ dp pulses.  calf soft and nttp or squeeze.  Edema: none    right  KNEE EXAM:    Skin: intact, no ecchymosis.  ROM: 4 degrees extension to 90 degrees flexion; limited by discomfort.  Effusion: trace  Tender: medial joint line.   Positive crepitus.  Palpable marginal osteophytes    Left knee exam:  Skin intact. No erythema or ecchymosis.  ROM: 3 extension to 100 flexion  Effusion: small  Tender to palpation medial joint line, lateral joint line  Positive medial and patello-femoral crepitus.  Palpable marginal osteophytes.      X-RAY: 3 views bilateral knee 11/9/2022 reviewed,   Right knee: No acute fracture or malalignment. Moderate to severe  lateral and " "patellofemoral compartment degenerative changes. Mild  medial compartment degenerative changes. No significant joint  effusion.     Left knee: No acute fracture or malalignment. Severe lateral  compartment degenerative changes with bone-on-bone articulation.  Moderate patellofemoral and mild medial compartment degenerative  changes. Moderate knee joint effusion.    3 views bilateral knees taken on 2/5/2018    Left: Advanced lateral compartment degenerative changes similar to the prior study, bone on bone with articular flattening, subchondral sclerosis. Moderate patellofemoral degenerative changes with large marginal osteophytes.     Right: Moderate patellofemoral degenerative changes, prominent marginal osteophyte. Marginal osteophyte formation in the lateral compartment without significant joint space loss.         Impression: Ayo Hernández is a 47 year old male with chronic bilateral knee pain, advanced primary osteoarthritis. Left  knee effusion.    Plan:   * reviewed imaging studies with patient, showing arthritic changes, or wearing of the cartilage in the knee. This can be caused by normal \"wear and tear\" over the years or following prior injury to the knee.    Treatment typically starts nonsurgically. Surgical indication for total knee arthroplasty  when nonsurgical management is no longer effective.    Non-surgical treatment for knee arthritis includes:    * rest, sitting  * Activity modification - avoid impact activities or activities that aggravate symptoms.  * NSAIDS (non-steroidal anti-inflammatory medications; e.g. Aleve, advil, motrin, ibuprofen) - regular use for inflammation ( twice daily or three times daily), with food, as long as no contra-indications Please discuss with primary care doctor if needed  * ice, 15-20 minutes at a time several times a day or as needed.  * Strengthening of quadriceps muscles  * Physical Therapy for strengthening, stretching and range of motion exercises of legs  * " "Tylenol as needed for pain, consider Tylenol arthritis or similar  * Weight loss: Weight loss:  Body mass index is 30.21 kg/m .. weight loss benefits, not only for the current pain symptoms, but also overall health. Recommend a good diet plan that works for the patient, with the assistance of a dietician or primary care doctor as needed. Also, a good, low-impact exercise program for at least 20 minutes per day, 3 times per week, such as exercise bike, elliptical , or pool.  * Exercise: low impact such as stationary bike, elliptical, pool.  * Injections: cortisone versus viscosupplementation (hyaluronic acid, \"rooster comb\", \"gel shots\"); risks and perceived benefits discussed today. Patient elects  to proceed.  * Bracing: bracing the knee may offer some relief of symptoms when worn and provide some stability.  * over the counter supplements such as glucosamine and chondroitin sulfate may help with joint pain.  * topical ointments may help as well    * return to clinic as needed.        Osvaldo Mccoy MD  Dept. of Orthopaedic Surgery  [facility]      Large Joint Injection/Arthocentesis: bilateral knee    Date/Time: 11/9/2022 9:57 AM  Performed by: Van Napoles PA  Authorized by: Osvaldo Mccoy MD     Indications:  Pain and osteoarthritis  Needle Size:  22 G  Guidance: landmark guided    Approach:  Anteromedial  Location:  Knee  Laterality:  Bilateral      Medications (Right):  4 mL bupivacaine 0.25 %; 80 mg triamcinolone 40 MG/ML  Medications (Left):  4 mL bupivacaine 0.25 %; 80 mg triamcinolone 40 MG/ML  Aspirate amount (mL):  12  Aspirate:  Bloody and yellow  Outcome:  Tolerated well, no immediate complications  Procedure discussed: discussed risks, benefits, and alternatives    Consent Given by:  Patient  Prep: patient was prepped and draped in usual sterile fashion          "

## 2022-11-09 ENCOUNTER — ANCILLARY PROCEDURE (OUTPATIENT)
Dept: GENERAL RADIOLOGY | Facility: CLINIC | Age: 47
End: 2022-11-09
Attending: PHYSICIAN ASSISTANT
Payer: COMMERCIAL

## 2022-11-09 ENCOUNTER — OFFICE VISIT (OUTPATIENT)
Dept: ORTHOPEDICS | Facility: CLINIC | Age: 47
End: 2022-11-09
Payer: COMMERCIAL

## 2022-11-09 VITALS
DIASTOLIC BLOOD PRESSURE: 83 MMHG | WEIGHT: 229 LBS | HEIGHT: 73 IN | SYSTOLIC BLOOD PRESSURE: 152 MMHG | HEART RATE: 75 BPM | BODY MASS INDEX: 30.35 KG/M2

## 2022-11-09 DIAGNOSIS — M17.0 BILATERAL PRIMARY OSTEOARTHRITIS OF KNEE: Primary | ICD-10-CM

## 2022-11-09 DIAGNOSIS — M17.0 BILATERAL PRIMARY OSTEOARTHRITIS OF KNEE: ICD-10-CM

## 2022-11-09 PROCEDURE — 20610 DRAIN/INJ JOINT/BURSA W/O US: CPT | Mod: 50 | Performed by: ORTHOPAEDIC SURGERY

## 2022-11-09 PROCEDURE — 99213 OFFICE O/P EST LOW 20 MIN: CPT | Mod: 25 | Performed by: ORTHOPAEDIC SURGERY

## 2022-11-09 PROCEDURE — 73562 X-RAY EXAM OF KNEE 3: CPT | Mod: TC | Performed by: RADIOLOGY

## 2022-11-09 RX ORDER — BUPIVACAINE HYDROCHLORIDE 2.5 MG/ML
4 INJECTION, SOLUTION INFILTRATION; PERINEURAL
Status: SHIPPED | OUTPATIENT
Start: 2022-11-09

## 2022-11-09 RX ORDER — TRIAMCINOLONE ACETONIDE 40 MG/ML
80 INJECTION, SUSPENSION INTRA-ARTICULAR; INTRAMUSCULAR
Status: SHIPPED | OUTPATIENT
Start: 2022-11-09

## 2022-11-09 RX ADMIN — BUPIVACAINE HYDROCHLORIDE 4 ML: 2.5 INJECTION, SOLUTION INFILTRATION; PERINEURAL at 09:57

## 2022-11-09 RX ADMIN — TRIAMCINOLONE ACETONIDE 80 MG: 40 INJECTION, SUSPENSION INTRA-ARTICULAR; INTRAMUSCULAR at 09:57

## 2022-11-09 ASSESSMENT — PAIN SCALES - GENERAL: PAINLEVEL: MODERATE PAIN (5)

## 2022-11-09 NOTE — LETTER
11/9/2022         RE: Ayo Hernández  [address] [place] [address]        Dear Colleague,    Thank you for referring your patient, Ayo Hernández, to the [facility] [place]. Please see a copy of my visit note below.    Chief Complaint   Patient presents with     Knee Pain     Bilateral knee pain. Last injection: bilateral knee 8/3/22, last aspiration: right knee only on 10/13/22, also had a right knee injection. Aspiration worked for about a day. He can feel fluid in the left knee and feels pressure in the right knee. He would like to have more injections today, possibly drain the left knee.        HISTORY OF PRESENT ILLNESS:  Ayo Hernández is a 47 year old male seen for chronicl knee pain today. Has been followed over the years for bilateral knee pain, advanced primary osteoarthritis.  He returns today with pain, swelling in the left knee. He's wondering about draining the left knee and getting shots in both knees today. Pain worsened after a recent trip, traveling in a plane, etc.    He had right knee aspiration/injection 10/13/2022, bilateral knee injections 8/3/2022 (both with depomedrol).    Some days hardly any pain and other days more severe pain.    Pain 5/10.    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      No past medical history on file.    Past Surgical History:   Procedure Laterality Date     ARTHROSCOPY KNEE Right 7/15/2016    Procedure: ARTHROSCOPY KNEE;  Surgeon: Osvaldo Mccoy MD;  Location:  OR     ORTHOPEDIC SURGERY  08.2012    left knee, arthroscopy     ORTHOPEDIC SURGERY  07.15.2016    right knee       Medications:   Current Outpatient Medications:      amLODIPine (NORVASC) 10 MG tablet, TAKE 1 TABLET (10 MG) BY MOUTH DAILY, Disp: 90 tablet, Rfl: 0     ibuprofen (ADVIL,MOTRIN) 200 MG tablet, Take 200 mg by mouth every 4 hours as needed., Disp: ,  "Rfl:      lisinopril (ZESTRIL) 5 MG tablet, Take 1 tablet (5 mg) by mouth daily, Disp: 90 tablet, Rfl: 0     triamcinolone (KENALOG) 0.1 % external cream, Apply topically 2 times daily as needed for irritation For up to 10 days as needed for rash, Disp: 30 g, Rfl: 0    Current Facility-Administered Medications:      bupivacaine (MARCAINE) 0.25 % injection 4 mL, 4 mL, , , Osvaldo Mccoy MD, 4 mL at 10/13/22 1659     methylPREDNISolone (DEPO-MEDROL) injection 80 mg, 80 mg, , , Osvaldo Mccoy MD, 80 mg at 10/13/22 1659    Allergies: No Known Allergies       REVIEW OF SYSTEMS:   CONSTITUTIONAL:NEGATIVE for fever, chills, night sweats  INTEGUMENTARY/SKIN: NEGATIVE for worrisome wound problems or redness.  MUSCULOSKELETAL:See HPI above  NEURO: NEGATIVE for weakness, dizziness or paresthesias      PHYSICAL EXAM:  BP (!) 152/83   Pulse 75   Ht 1.854 m (6' 1\")   Wt 103.9 kg (229 lb)   BMI 30.21 kg/m     GENERAL APPEARANCE: healthy, alert, no distress  SKIN: no suspicious lesions or rashes  NEURO: Normal strength and tone, mentation intact and speech normal  PSYCH:  mentation appears normal and affect normal, not anxious  RESPIRATORY: No increased work of breathing.    Gait: favors the right   Alignment: valgus, quite severe.    mild Quad atrophy, strength normal.  Intact sensation deep peroneal nerve, superficial peroneal nerve, med/lat tibial nerve, sural nerve, saphenous nerve  Intact EHL, EDL, TA, FHL, GS, quadriceps hamstrings and hip flexors  Toes warm and well perfused, brisk capillary refill. Palpable 2+ dp pulses.  calf soft and nttp or squeeze.  Edema: none    right  KNEE EXAM:    Skin: intact, no ecchymosis.  ROM: 4 degrees extension to 90 degrees flexion; limited by discomfort.  Effusion: trace  Tender: medial joint line.   Positive crepitus.  Palpable marginal osteophytes    Left knee exam:  Skin intact. No erythema or ecchymosis.  ROM: 3 extension to 100 flexion  Effusion: small  Tender to palpation " "medial joint line, lateral joint line  Positive medial and patello-femoral crepitus.  Palpable marginal osteophytes.      X-RAY: 3 views bilateral knee 11/9/2022 reviewed,   Right knee: No acute fracture or malalignment. Moderate to severe  lateral and patellofemoral compartment degenerative changes. Mild  medial compartment degenerative changes. No significant joint  effusion.     Left knee: No acute fracture or malalignment. Severe lateral  compartment degenerative changes with bone-on-bone articulation.  Moderate patellofemoral and mild medial compartment degenerative  changes. Moderate knee joint effusion.    3 views bilateral knees taken on 2/5/2018    Left: Advanced lateral compartment degenerative changes similar to the prior study, bone on bone with articular flattening, subchondral sclerosis. Moderate patellofemoral degenerative changes with large marginal osteophytes.     Right: Moderate patellofemoral degenerative changes, prominent marginal osteophyte. Marginal osteophyte formation in the lateral compartment without significant joint space loss.         Impression: Ayo Hernández is a 47 year old male with chronic bilateral knee pain, advanced primary osteoarthritis. Left  knee effusion.    Plan:   * reviewed imaging studies with patient, showing arthritic changes, or wearing of the cartilage in the knee. This can be caused by normal \"wear and tear\" over the years or following prior injury to the knee.    Treatment typically starts nonsurgically. Surgical indication for total knee arthroplasty  when nonsurgical management is no longer effective.    Non-surgical treatment for knee arthritis includes:    * rest, sitting  * Activity modification - avoid impact activities or activities that aggravate symptoms.  * NSAIDS (non-steroidal anti-inflammatory medications; e.g. Aleve, advil, motrin, ibuprofen) - regular use for inflammation ( twice daily or three times daily), with food, as long as no " "contra-indications Please discuss with primary care doctor if needed  * ice, 15-20 minutes at a time several times a day or as needed.  * Strengthening of quadriceps muscles  * Physical Therapy for strengthening, stretching and range of motion exercises of legs  * Tylenol as needed for pain, consider Tylenol arthritis or similar  * Weight loss: Weight loss:  Body mass index is 30.21 kg/m .. weight loss benefits, not only for the current pain symptoms, but also overall health. Recommend a good diet plan that works for the patient, with the assistance of a dietician or primary care doctor as needed. Also, a good, low-impact exercise program for at least 20 minutes per day, 3 times per week, such as exercise bike, elliptical , or pool.  * Exercise: low impact such as stationary bike, elliptical, pool.  * Injections: cortisone versus viscosupplementation (hyaluronic acid, \"rooster comb\", \"gel shots\"); risks and perceived benefits discussed today. Patient elects  to proceed.  * Bracing: bracing the knee may offer some relief of symptoms when worn and provide some stability.  * over the counter supplements such as glucosamine and chondroitin sulfate may help with joint pain.  * topical ointments may help as well    * return to clinic as needed.        Osvaldo Mccoy MD  Dept. of Orthopaedic Surgery  [facility]      Large Joint Injection/Arthocentesis: bilateral knee    Date/Time: 11/9/2022 9:57 AM  Performed by: Van Napoles PA  Authorized by: Osvaldo Mccoy MD     Indications:  Pain and osteoarthritis  Needle Size:  22 G  Guidance: landmark guided    Approach:  Anteromedial  Location:  Knee  Laterality:  Bilateral      Medications (Right):  4 mL bupivacaine 0.25 %; 80 mg triamcinolone 40 MG/ML  Medications (Left):  4 mL bupivacaine 0.25 %; 80 mg triamcinolone 40 MG/ML  Aspirate amount (mL):  12  Aspirate:  Bloody and yellow  Outcome:  Tolerated well, no immediate complications  Procedure " discussed: discussed risks, benefits, and alternatives    Consent Given by:  Patient  Prep: patient was prepped and draped in usual sterile fashion              Again, thank you for allowing me to participate in the care of your patient.        Sincerely,        Osvaldo Mccoy MD

## 2023-01-08 ENCOUNTER — HEALTH MAINTENANCE LETTER (OUTPATIENT)
Age: 48
End: 2023-01-08

## 2023-01-09 DIAGNOSIS — I10 HYPERTENSION GOAL BP (BLOOD PRESSURE) < 140/90: ICD-10-CM

## 2023-01-09 RX ORDER — AMLODIPINE BESYLATE 10 MG/1
10 TABLET ORAL DAILY
Qty: 90 TABLET | Refills: 0 | Status: SHIPPED | OUTPATIENT
Start: 2023-01-09 | End: 2023-03-13

## 2023-01-09 NOTE — LETTER
January 10, 2023    Ayo Hernández  [address] [facility] [address]    Dear Ayo,       We recently received a refill request for amLODIPine (NORVASC) 10 MG tablet.  We have refilled this for a one time 90 day supply only because you are due for a:    Physical office visit and fasting lab appointment      Please schedule an office visit with your provider and a lab appointment 4-5 days prior to the office visit.     Please call at your earliest convenience so that there will not be a delay with your future refills.          Thank you,   Your [facility] Team/  [phone]

## 2023-01-10 ENCOUNTER — TRANSFERRED RECORDS (OUTPATIENT)
Dept: HEALTH INFORMATION MANAGEMENT | Facility: CLINIC | Age: 48
End: 2023-01-10

## 2023-02-07 ENCOUNTER — OFFICE VISIT (OUTPATIENT)
Dept: FAMILY MEDICINE | Facility: CLINIC | Age: 48
End: 2023-02-07
Payer: COMMERCIAL

## 2023-02-07 VITALS
OXYGEN SATURATION: 98 % | SYSTOLIC BLOOD PRESSURE: 156 MMHG | TEMPERATURE: 97.6 F | BODY MASS INDEX: 30.19 KG/M2 | HEIGHT: 72 IN | RESPIRATION RATE: 16 BRPM | WEIGHT: 222.9 LBS | DIASTOLIC BLOOD PRESSURE: 86 MMHG | HEART RATE: 53 BPM

## 2023-02-07 DIAGNOSIS — Z01.818 PREOP GENERAL PHYSICAL EXAM: Primary | ICD-10-CM

## 2023-02-07 DIAGNOSIS — Z01.818 PREOP EXAMINATION: ICD-10-CM

## 2023-02-07 DIAGNOSIS — I10 HYPERTENSION GOAL BP (BLOOD PRESSURE) < 140/90: ICD-10-CM

## 2023-02-07 DIAGNOSIS — G89.29 CHRONIC PAIN OF RIGHT KNEE: ICD-10-CM

## 2023-02-07 DIAGNOSIS — M25.561 CHRONIC PAIN OF RIGHT KNEE: ICD-10-CM

## 2023-02-07 LAB
ANION GAP SERPL CALCULATED.3IONS-SCNC: 2 MMOL/L (ref 3–14)
BASOPHILS # BLD AUTO: 0 10E3/UL (ref 0–0.2)
BASOPHILS NFR BLD AUTO: 0 %
BUN SERPL-MCNC: 18 MG/DL (ref 7–30)
CALCIUM SERPL-MCNC: 9.2 MG/DL (ref 8.5–10.1)
CHLORIDE BLD-SCNC: 109 MMOL/L (ref 94–109)
CO2 SERPL-SCNC: 31 MMOL/L (ref 20–32)
CREAT SERPL-MCNC: 0.78 MG/DL (ref 0.66–1.25)
EOSINOPHIL # BLD AUTO: 0.1 10E3/UL (ref 0–0.7)
EOSINOPHIL NFR BLD AUTO: 1 %
ERYTHROCYTE [DISTWIDTH] IN BLOOD BY AUTOMATED COUNT: 12.3 % (ref 10–15)
GFR SERPL CREATININE-BSD FRML MDRD: >90 ML/MIN/1.73M2
GLUCOSE BLD-MCNC: 93 MG/DL (ref 70–99)
HCT VFR BLD AUTO: 42.7 % (ref 40–53)
HGB BLD-MCNC: 14.8 G/DL (ref 13.3–17.7)
HGB BLD-MCNC: 14.8 G/DL (ref 13.3–17.7)
LYMPHOCYTES # BLD AUTO: 2.1 10E3/UL (ref 0.8–5.3)
LYMPHOCYTES NFR BLD AUTO: 39 %
MCH RBC QN AUTO: 32.7 PG (ref 26.5–33)
MCHC RBC AUTO-ENTMCNC: 34.4 G/DL (ref 31.5–36.5)
MCV RBC AUTO: 95 FL (ref 78–100)
MONOCYTES # BLD AUTO: 0.4 10E3/UL (ref 0–1.3)
MONOCYTES NFR BLD AUTO: 8 %
NEUTROPHILS # BLD AUTO: 2.8 10E3/UL (ref 1.6–8.3)
NEUTROPHILS NFR BLD AUTO: 52 %
PLATELET # BLD AUTO: 231 10E3/UL (ref 150–450)
POTASSIUM BLD-SCNC: 4.4 MMOL/L (ref 3.4–5.3)
RBC # BLD AUTO: 4.5 10E6/UL (ref 4.4–5.9)
SODIUM SERPL-SCNC: 142 MMOL/L (ref 133–144)
WBC # BLD AUTO: 5.4 10E3/UL (ref 4–11)

## 2023-02-07 PROCEDURE — 85025 COMPLETE CBC W/AUTO DIFF WBC: CPT | Performed by: PHYSICIAN ASSISTANT

## 2023-02-07 PROCEDURE — 90715 TDAP VACCINE 7 YRS/> IM: CPT | Performed by: PHYSICIAN ASSISTANT

## 2023-02-07 PROCEDURE — 99214 OFFICE O/P EST MOD 30 MIN: CPT | Mod: 25 | Performed by: PHYSICIAN ASSISTANT

## 2023-02-07 PROCEDURE — 80048 BASIC METABOLIC PNL TOTAL CA: CPT | Performed by: PHYSICIAN ASSISTANT

## 2023-02-07 PROCEDURE — 93000 ELECTROCARDIOGRAM COMPLETE: CPT | Performed by: PHYSICIAN ASSISTANT

## 2023-02-07 PROCEDURE — 36415 COLL VENOUS BLD VENIPUNCTURE: CPT | Performed by: PHYSICIAN ASSISTANT

## 2023-02-07 PROCEDURE — 90471 IMMUNIZATION ADMIN: CPT | Performed by: PHYSICIAN ASSISTANT

## 2023-02-07 RX ORDER — LISINOPRIL 5 MG/1
5 TABLET ORAL DAILY
Qty: 90 TABLET | Refills: 0 | Status: SHIPPED | OUTPATIENT
Start: 2023-02-07 | End: 2023-05-16

## 2023-02-07 ASSESSMENT — PAIN SCALES - GENERAL: PAINLEVEL: MILD PAIN (3)

## 2023-02-07 NOTE — PROGRESS NOTES
[facility]  [address] [facility] [address]  Phone: [phone]  Primary Provider: Juan Pablo Kimbrough  Pre-op Performing Provider: VINCE GAMINO      PREOPERATIVE EVALUATION:  Today's date: 2/7/2023    Ayo Hernández is a 47 year old male who presents for a preoperative evaluation.    Surgical Information:  Surgery/Procedure: Right Knee Replacement  [facility]: [facility]  Surgeon: Dr. Obrien  Surgery Date: 3/1/2023  Time of Surgery: Unknown  Where patient plans to recover: At home with family  Fax number for surgical facility: [phone]    Type of Anesthesia Anticipated: To be determined     Assessment & Plan     The proposed surgical procedure is considered INTERMEDIATE risk.    (Z01.818) Preop general physical exam  (primary encounter diagnosis)  Comment: Patient here for preop examination.  Patient has a history of hypertension.  Has been only taking amlodipine.  Borderline blood pressure here.  Discussed with the patient continuing with lisinopril which he has been prescribed in the past.  Plan: Hemoglobin, Basic metabolic panel  (Ca, Cl,         CO2, Creat, Gluc, K, Na, BUN)          (M25.561,  G89.29) Chronic pain of right knee  Comment: Chronic pain of the right knee.  History of osteoarthritis.  Has received knee injections.  Plan for right knee replacement 3/1/2023.    (I10) Hypertension goal BP (blood pressure) < 140/90  Comment: History of hypertension.  On amlodipine.  Blood pressure here elevated at 156/86.  No chest pain or shortness of breath EKG unchanged from baseline.  History of poor R wave progression on EKG.  Discussed with patient these results.  Hemoglobin stable at 14.8.  Basic metabolic panel in process.  Patient will follow up for recheck of blood pressure in the next 2 weeks.       Risks and Recommendations:  The patient has the following additional risks and recommendations for perioperative complications:  Cardiovascular:   -  Elevated blood pressure slightly at Preop. Will restart medications.     Medication Instructions:  Patient is to take all scheduled medications on the day of surgery   - ibuprofen (Advil, Motrin): HOLD 7 day before surgery.     RECOMMENDATION:  APPROVAL GIVEN to proceed with proposed procedure pending review of diagnostic evaluation.      Subjective     HPI related to upcoming procedure:     Right knee.   Right knee pain for the last 12 years. Injections previously since 2012 no longer relief. Meniscus issues. Osteoarthritis.     Preop Questions 2/6/2023   1. Have you ever had a heart attack or stroke? No   2. Have you ever had surgery on your heart or blood vessels, such as a stent placement, a coronary artery bypass, or surgery on an artery in your head, neck, heart, or legs? No   3. Do you have chest pain with activity? No   4. Do you have a history of  heart failure? No   5. Do you currently have a cold, bronchitis or symptoms of other infection? No   6. Do you have a cough, shortness of breath, or wheezing? No   7. Do you or anyone in your family have previous history of blood clots? No   8. Do you or does anyone in your family have a serious bleeding problem such as prolonged bleeding following surgeries or cuts? No   9. Have you ever had problems with anemia or been told to take iron pills? No   10. Have you had any abnormal blood loss such as black, tarry or bloody stools? No   11. Have you ever had a blood transfusion? No   12. Are you willing to have a blood transfusion if it is medically needed before, during, or after your surgery? Yes   13. Have you or any of your relatives ever had problems with anesthesia? No   14. Do you have sleep apnea, excessive snoring or daytime drowsiness? No   15. Do you have any artifical heart valves or other implanted medical devices like a pacemaker, defibrillator, or continuous glucose monitor? No   16. Do you have artificial joints? No   17. Are you allergic to latex? No        Health Care Directive:  Patient does not have a Health Care Directive or Living Will: Discussed advance care planning with patient; information given to patient to review.    Preoperative Review of :   reviewed - controlled substances reflected in medication list.      Status of Chronic Conditions:  HYPERTENSION - Patient has longstanding history of HTN , currently denies any symptoms referable to elevated blood pressure. Specifically denies chest pain, palpitations, dyspnea, orthopnea, PND or peripheral edema. Blood pressure readings have been slightly elevated. Restarting medications. Current medication regimen is as listed below. Patient denies any side effects of medication.       Review of Systems  CONSTITUTIONAL: NEGATIVE for fever, chills, change in weight  INTEGUMENTARY/SKIN: NEGATIVE for worrisome rashes, moles or lesions  EYES: NEGATIVE for vision changes or irritation  ENT/MOUTH: NEGATIVE for ear, mouth and throat problems  RESP: NEGATIVE for significant cough or SOB  CV: NEGATIVE for chest pain, palpitations or peripheral edema  GI: NEGATIVE for nausea, abdominal pain, heartburn, or change in bowel habits  : NEGATIVE for frequency, dysuria, or hematuria  MUSCULOSKELETAL: NEGATIVE for significant arthralgias or myalgia  NEURO: NEGATIVE for weakness, dizziness or paresthesias  ENDOCRINE: NEGATIVE for temperature intolerance, skin/hair changes  HEME: NEGATIVE for bleeding problems  PSYCHIATRIC: NEGATIVE for changes in mood or affect    Patient Active Problem List    Diagnosis Date Noted     Other male erectile dysfunction 07/12/2016     Priority: Medium     Chronic pain of right knee 07/12/2016     Priority: Medium     ED (erectile dysfunction) 09/06/2013     Priority: Medium     Elevated blood pressure reading without diagnosis of hypertension 09/06/2013     Priority: Medium     Family history of colon cancer 09/06/2013     Priority: Medium     BMI 32.0-32.9,adult 09/11/2012     Priority:  Medium     CARDIOVASCULAR SCREENING; LDL GOAL LESS THAN 160 10/31/2010     Priority: Medium     Left Lumbar Radiculopathy L4-5 09/01/2010     Priority: Medium     Lumbar spinal stenosis 09/01/2010     Priority: Medium      Past Medical History:   Diagnosis Date     Arthritis      Hypertension      Past Surgical History:   Procedure Laterality Date     ARTHROSCOPY KNEE Right 7/15/2016    Procedure: ARTHROSCOPY KNEE;  Surgeon: Osvaldo Mccoy MD;  Location: [facility] OR     ORTHOPEDIC SURGERY  08.2012    left knee, arthroscopy     ORTHOPEDIC SURGERY  07.15.2016    right knee     Current Outpatient Medications   Medication Sig Dispense Refill     amLODIPine (NORVASC) 10 MG tablet Take 1 tablet (10 mg) by mouth daily 90 tablet 0     lisinopril (ZESTRIL) 5 MG tablet Take 1 tablet (5 mg) by mouth daily for 90 days 90 tablet 0   Tramadol 50 mg as needed.     No Known Allergies     Social History     Tobacco Use     Smoking status: Never     Smokeless tobacco: Current     Types: Chew   Substance Use Topics     Alcohol use: Yes     Family History   Problem Relation Age of Onset     Cancer Mother      Hypertension Father      History   Drug Use No         Objective     BP (!) 156/86   Pulse 53   Temp 97.6  F (36.4  C) (Tympanic)   Resp 16   Ht 1.829 m (6')   Wt 101.1 kg (222 lb 14.4 oz)   SpO2 98%   BMI 30.23 kg/m      Physical Exam    GENERAL APPEARANCE: healthy, alert and no distress     EYES: EOMI,  PERRL     HENT: ear canals and TM's normal and nose and mouth without ulcers or lesions     NECK: no adenopathy, no asymmetry, masses, or scars and thyroid normal to palpation     RESP: lungs clear to auscultation - no rales, rhonchi or wheezes     CV: regular rates and rhythm, normal S1 S2, no S3 or S4 and no murmur, click or rub     ABDOMEN:  soft, nontender, no HSM or masses and bowel sounds normal     MS: extremities normal- no gross deformities noted, no evidence of inflammation in joints, FROM in all extremities.  No  pain with palpation about the right knee.  No overlying rash or erythema.  Full range of motion intact     SKIN: no suspicious lesions or rashes     NEURO: Normal strength and tone, sensory exam grossly normal, mentation intact and speech normal     PSYCH: mentation appears normal. and affect normal/bright     LYMPHATICS: No cervical adenopathy    Recent Labs   Lab Test 07/09/21  1002      POTASSIUM 4.6   CR 0.88        Diagnostics:  Labs pending at this time.  Results will be reviewed when available.   CBC and basic metabolic panel in process.    Hemoglobin 14.8  CBC RESULTS: Recent Labs   Lab Test 02/07/23  1157   WBC 5.4   RBC 4.50   HGB 14.8  14.8   HCT 42.7   MCV 95   MCH 32.7   MCHC 34.4   RDW 12.3            EKG: Poor R wave progression with normal sinus rhythm.  Poor R wave progression appears stable from EKG 2010.  Unchanged from baseline    Revised Cardiac Risk Index (RCRI):  The patient has the following serious cardiovascular risks for perioperative complications:   - No serious cardiac risks = 0 points     RCRI Interpretation: 0 points: Class I (very low risk - 0.4% complication rate)           Signed Electronically by: Kalin Iniguez PA-C  Copy of this evaluation report is provided to requesting physician.

## 2023-02-21 ENCOUNTER — TRANSFERRED RECORDS (OUTPATIENT)
Dept: HEALTH INFORMATION MANAGEMENT | Facility: CLINIC | Age: 48
End: 2023-02-21

## 2023-02-21 ENCOUNTER — ALLIED HEALTH/NURSE VISIT (OUTPATIENT)
Dept: FAMILY MEDICINE | Facility: CLINIC | Age: 48
End: 2023-02-21
Payer: COMMERCIAL

## 2023-02-21 VITALS — SYSTOLIC BLOOD PRESSURE: 122 MMHG | DIASTOLIC BLOOD PRESSURE: 72 MMHG | HEART RATE: 65 BPM

## 2023-02-21 DIAGNOSIS — I10 HYPERTENSION GOAL BP (BLOOD PRESSURE) < 140/90: Primary | ICD-10-CM

## 2023-02-21 PROCEDURE — 99207 PR NO CHARGE NURSE ONLY: CPT

## 2023-02-21 NOTE — PROGRESS NOTES
I met with Ayo Hernández at the request of Hira Gagnon PA-C to recheck his blood pressure.  Blood pressure medications on the med list were reviewed with patient.    Patient has taken all medications as per usual regimen: Yes  Patient reports tolerating them without any issues or concerns: No    Vitals:    02/21/23 1209   BP: 122/72   Pulse: 65       Blood pressure was taken, previous encounter was reviewed, recorded blood pressure below 140/90.  Patient was discharged and the note will be sent to the provider for final review.     Ankur SAMANO MA

## 2023-02-21 NOTE — Clinical Note
Patient came in for blood pressure recheck today, BP: 122/72 pulse: 65. This was for a Pre-Op.  Ankur SAMANO MA

## 2023-03-01 ENCOUNTER — TRANSFERRED RECORDS (OUTPATIENT)
Dept: HEALTH INFORMATION MANAGEMENT | Facility: CLINIC | Age: 48
End: 2023-03-01

## 2023-03-16 ENCOUNTER — TRANSFERRED RECORDS (OUTPATIENT)
Dept: HEALTH INFORMATION MANAGEMENT | Facility: CLINIC | Age: 48
End: 2023-03-16

## 2023-04-18 ENCOUNTER — TRANSFERRED RECORDS (OUTPATIENT)
Dept: HEALTH INFORMATION MANAGEMENT | Facility: CLINIC | Age: 48
End: 2023-04-18
Payer: COMMERCIAL

## 2023-04-27 ENCOUNTER — TRANSFERRED RECORDS (OUTPATIENT)
Dept: HEALTH INFORMATION MANAGEMENT | Facility: CLINIC | Age: 48
End: 2023-04-27
Payer: COMMERCIAL

## 2023-05-09 ENCOUNTER — TRANSFERRED RECORDS (OUTPATIENT)
Dept: HEALTH INFORMATION MANAGEMENT | Facility: CLINIC | Age: 48
End: 2023-05-09
Payer: COMMERCIAL

## 2023-06-02 ENCOUNTER — HEALTH MAINTENANCE LETTER (OUTPATIENT)
Age: 48
End: 2023-06-02

## 2023-06-05 ENCOUNTER — MYC MEDICAL ADVICE (OUTPATIENT)
Dept: FAMILY MEDICINE | Facility: CLINIC | Age: 48
End: 2023-06-05

## 2023-06-05 ENCOUNTER — TELEPHONE (OUTPATIENT)
Dept: FAMILY MEDICINE | Facility: CLINIC | Age: 48
End: 2023-06-05
Payer: COMMERCIAL

## 2023-06-05 NOTE — TELEPHONE ENCOUNTER
Patient Quality Outreach    Patient is due for the following:   Colon Cancer Screening  Physical Preventive Adult Physical      Topic Date Due     Hepatitis B Vaccine (1 of 3 - 3-dose series) Never done     COVID-19 Vaccine (3 - Moderna series) 07/05/2021       Next Steps:   Schedule a Adult Preventative    Type of outreach:    Sent [employer] message.      Questions for provider review:               Francy Hatfield MA

## 2023-07-18 ENCOUNTER — OFFICE VISIT (OUTPATIENT)
Dept: FAMILY MEDICINE | Facility: CLINIC | Age: 48
End: 2023-07-18
Payer: COMMERCIAL

## 2023-07-18 VITALS
TEMPERATURE: 97.3 F | WEIGHT: 233.2 LBS | OXYGEN SATURATION: 98 % | HEART RATE: 60 BPM | RESPIRATION RATE: 16 BRPM | BODY MASS INDEX: 31.59 KG/M2 | SYSTOLIC BLOOD PRESSURE: 138 MMHG | HEIGHT: 72 IN | DIASTOLIC BLOOD PRESSURE: 87 MMHG

## 2023-07-18 DIAGNOSIS — Z12.11 SCREEN FOR COLON CANCER: ICD-10-CM

## 2023-07-18 DIAGNOSIS — I10 HYPERTENSION GOAL BP (BLOOD PRESSURE) < 140/90: ICD-10-CM

## 2023-07-18 DIAGNOSIS — Z01.818 PREOP GENERAL PHYSICAL EXAM: Primary | ICD-10-CM

## 2023-07-18 DIAGNOSIS — M17.12 OSTEOARTHRITIS OF LEFT KNEE, UNSPECIFIED OSTEOARTHRITIS TYPE: ICD-10-CM

## 2023-07-18 LAB
ANION GAP SERPL CALCULATED.3IONS-SCNC: 10 MMOL/L (ref 7–15)
BASOPHILS # BLD AUTO: 0 10E3/UL (ref 0–0.2)
BASOPHILS NFR BLD AUTO: 0 %
BUN SERPL-MCNC: 21.7 MG/DL (ref 6–20)
CALCIUM SERPL-MCNC: 9.3 MG/DL (ref 8.6–10)
CHLORIDE SERPL-SCNC: 104 MMOL/L (ref 98–107)
CREAT SERPL-MCNC: 0.87 MG/DL (ref 0.67–1.17)
DEPRECATED HCO3 PLAS-SCNC: 25 MMOL/L (ref 22–29)
EOSINOPHIL # BLD AUTO: 0.3 10E3/UL (ref 0–0.7)
EOSINOPHIL NFR BLD AUTO: 4 %
ERYTHROCYTE [DISTWIDTH] IN BLOOD BY AUTOMATED COUNT: 13.2 % (ref 10–15)
GFR SERPL CREATININE-BSD FRML MDRD: >90 ML/MIN/1.73M2
GLUCOSE SERPL-MCNC: 92 MG/DL (ref 70–99)
HCT VFR BLD AUTO: 41.9 % (ref 40–53)
HGB BLD-MCNC: 14 G/DL (ref 13.3–17.7)
IMM GRANULOCYTES # BLD: 0 10E3/UL
IMM GRANULOCYTES NFR BLD: 0 %
LYMPHOCYTES # BLD AUTO: 2.2 10E3/UL (ref 0.8–5.3)
LYMPHOCYTES NFR BLD AUTO: 34 %
MCH RBC QN AUTO: 30 PG (ref 26.5–33)
MCHC RBC AUTO-ENTMCNC: 33.4 G/DL (ref 31.5–36.5)
MCV RBC AUTO: 90 FL (ref 78–100)
MONOCYTES # BLD AUTO: 0.7 10E3/UL (ref 0–1.3)
MONOCYTES NFR BLD AUTO: 10 %
NEUTROPHILS # BLD AUTO: 3.5 10E3/UL (ref 1.6–8.3)
NEUTROPHILS NFR BLD AUTO: 52 %
PLATELET # BLD AUTO: 217 10E3/UL (ref 150–450)
POTASSIUM SERPL-SCNC: 4.5 MMOL/L (ref 3.4–5.3)
RBC # BLD AUTO: 4.66 10E6/UL (ref 4.4–5.9)
SODIUM SERPL-SCNC: 139 MMOL/L (ref 136–145)
WBC # BLD AUTO: 6.7 10E3/UL (ref 4–11)

## 2023-07-18 PROCEDURE — 85025 COMPLETE CBC W/AUTO DIFF WBC: CPT | Performed by: PHYSICIAN ASSISTANT

## 2023-07-18 PROCEDURE — 93000 ELECTROCARDIOGRAM COMPLETE: CPT | Performed by: PHYSICIAN ASSISTANT

## 2023-07-18 PROCEDURE — 36415 COLL VENOUS BLD VENIPUNCTURE: CPT | Performed by: PHYSICIAN ASSISTANT

## 2023-07-18 PROCEDURE — 80048 BASIC METABOLIC PNL TOTAL CA: CPT | Performed by: PHYSICIAN ASSISTANT

## 2023-07-18 PROCEDURE — 99214 OFFICE O/P EST MOD 30 MIN: CPT | Performed by: PHYSICIAN ASSISTANT

## 2023-07-18 ASSESSMENT — PAIN SCALES - GENERAL: PAINLEVEL: NO PAIN (0)

## 2023-07-18 NOTE — PATIENT INSTRUCTIONS
For informational purposes only. Not to replace the advice of your health care provider. Copyright   2003,  [facility]. All rights reserved. Clinically reviewed by Carolyn Bal MD. [employer] [id number] - REV .  Preparing for Your Surgery  Getting started  A nurse will call you to review your health history and instructions. They will give you an arrival time based on your scheduled surgery time. Please be ready to share:  Your doctor's clinic name and phone number  Your medical, surgical, and anesthesia history  A list of allergies and sensitivities  A list of medicines, including herbal treatments and over-the-counter drugs  Whether the patient has a legal guardian (ask how to send us the papers in advance)  Please tell us if you're pregnant--or if there's any chance you might be pregnant. Some surgeries may injure a fetus (unborn baby), so they require a pregnancy test. Surgeries that are safe for a fetus don't always need a test, and you can choose whether to have one.   If you have a child who's having surgery, please ask for a copy of Preparing for Your Child's Surgery.    Preparing for surgery  Within 10 to 30 days of surgery: Have a pre-op exam (sometimes called an H&P, or History and Physical). This can be done at a clinic or pre-operative center.  If you're having a , you may not need this exam. Talk to your care team.  At your pre-op exam, talk to your care team about all medicines you take. If you need to stop any medicines before surgery, ask when to start taking them again.  We do this for your safety. Many medicines can make you bleed too much during surgery. Some change how well surgery (anesthesia) drugs work.  Call your insurance company to let them know you're having surgery. (If you don't have insurance, call [phone].)  Call your clinic if there's any change in your health. This includes signs of a cold or flu (sore throat, runny nose, cough, rash, fever). It  also includes a scrape or scratch near the surgery site.  If you have questions on the day of surgery, call your hospital or surgery center.  Eating and drinking guidelines  For your safety: Unless your surgeon tells you otherwise, follow the guidelines below.  Eat and drink as usual until 8 hours before you arrive for surgery. After that, no food or milk.  Drink clear liquids until 2 hours before you arrive. These are liquids you can see through, like water, Gatorade, and Propel Water. They also include plain black coffee and tea (no cream or milk), candy, and breath mints. You can spit out gum when you arrive.  If you drink alcohol: Stop drinking it the night before surgery.  If your care team tells you to take medicine on the morning of surgery, it's okay to take it with a sip of water.  Preventing infection  Shower or bathe the night before and morning of your surgery. Follow the instructions your clinic gave you. (If no instructions, use regular soap.)  Don't shave or clip hair near your surgery site. We'll remove the hair if needed.  Don't smoke or vape the morning of surgery. You may chew nicotine gum up to 2 hours before surgery. A nicotine patch is okay.  Note: Some surgeries require you to completely quit smoking and nicotine. Check with your surgeon.  Your care team will make every effort to keep you safe from infection. We will:  Clean our hands often with soap and water (or an alcohol-based hand rub).  Clean the skin at your surgery site with a special soap that kills germs.  Give you a special gown to keep you warm. (Cold raises the risk of infection.)  Wear special hair covers, masks, gowns and gloves during surgery.  Give antibiotic medicine, if prescribed. Not all surgeries need antibiotics.  What to bring on the day of surgery  Photo ID and insurance card  Copy of your health care directive, if you have one  Glasses and hearing aids (bring cases)  You can't wear contacts during surgery  Inhaler and  eye drops, if you use them (tell us about these when you arrive)  CPAP machine or breathing device, if you use them  A few personal items, if spending the night  If you have . . .  A pacemaker, ICD (cardiac defibrillator) or other implant: Bring the ID card.  An implanted stimulator: Bring the remote control.  A legal guardian: Bring a copy of the certified (court-stamped) guardianship papers.  Please remove any jewelry, including body piercings. Leave jewelry and other valuables at home.  If you're going home the day of surgery  You must have a responsible adult drive you home. They should stay with you overnight as well.  If you don't have someone to stay with you, and you aren't safe to go home alone, we may keep you overnight. Insurance often won't pay for this.  After surgery  If it's hard to control your pain or you need more pain medicine, please call your surgeon's office.  Questions?   If you have any questions for your care team, list them here: _________________________________________________________________________________________________________________________________________________________________________ ____________________________________ ____________________________________ ____________________________________    How to Take Your Medication Before Surgery   - ACE/ARB: HOLD on day of surgery (minimum 11 hours for general anesthesia).   - Calcium Channel Blockers: May be continued on the day of surgery.   - ibuprofen (Advil, Motrin): HOLD 5 day before surgery.

## 2023-07-18 NOTE — PROGRESS NOTES
[facility]  [address] [address]  Phone: [phone]  Primary Provider: Juan Pablo Kimbrough  Pre-op Performing Provider: VINCE GAMINO      PREOPERATIVE EVALUATION:  Today's date: 7/18/2023    Ayo Hernández is a 48 year old male who presents for a preoperative evaluation.       No data to display              Surgical Information:  Surgery/Procedure: Left Knee Replacement  [facility]  Surgeon: Dr. Obrien  Surgery Date: 8/7/2023  Time of Surgery: TBD  Where patient plans to recover: At home with family  Fax number for surgical facility: [phone]    Assessment & Plan     The proposed surgical procedure is considered INTERMEDIATE risk.    Preop general physical exam  - CBC with platelets and differential; Future  - Basic metabolic panel  (Ca, Cl, CO2, Creat, Gluc, K, Na, BUN); Future    Osteoarthritis of left knee, unspecified osteoarthritis type  Multiple years left knee pain. History of osteoarthritis. No relief with injections.     Hypertension goal BP (blood pressure) < 140/90  Blood pressure in goal.     Screen for colon cancer  - Fecal colorectal cancer screen FIT - Future (S+30); Future     - No identified additional risk factors other than previously addressed    Antiplatelet or Anticoagulation Medication Instructions:   - Patient is on no antiplatelet or anticoagulation medications.    Additional Medication Instructions:   - ACE/ARB: HOLD on day of surgery (minimum 11 hours for general anesthesia).   - Calcium Channel Blockers: May be continued on the day of surgery.   - ibuprofen (Advil, Motrin): HOLD 5 day before surgery.     RECOMMENDATION:  APPROVAL GIVEN to proceed with proposed procedure pending review of diagnostic evaluation.    Subjective     HPI related to upcoming procedure:   Left knee pain for the last 10-12 years. History of osteoarthritis. Has had multiple injections over the last 9 years.         7/18/2023     8:43  AM   Preop Questions   1. Have you ever had a heart attack or stroke? No   2. Have you ever had surgery on your heart or blood vessels, such as a stent placement, a coronary artery bypass, or surgery on an artery in your head, neck, heart, or legs? No   3. Do you have chest pain with activity? No   4. Do you have a history of  heart failure? No   5. Do you currently have a cold, bronchitis or symptoms of other infection? No   6. Do you have a cough, shortness of breath, or wheezing? No   7. Do you or anyone in your family have previous history of blood clots? No   8. Do you or does anyone in your family have a serious bleeding problem such as prolonged bleeding following surgeries or cuts? No   9. Have you ever had problems with anemia or been told to take iron pills? No   10. Have you had any abnormal blood loss such as black, tarry or bloody stools? No   11. Have you ever had a blood transfusion? No   12. Are you willing to have a blood transfusion if it is medically needed before, during, or after your surgery? Yes   13. Have you or any of your relatives ever had problems with anesthesia? No   14. Do you have sleep apnea, excessive snoring or daytime drowsiness? No   15. Do you have any artifical heart valves or other implanted medical devices like a pacemaker, defibrillator, or continuous glucose monitor? No   16. Do you have artificial joints? YES - Right knee replacement    17. Are you allergic to latex? No       Health Care Directive:  Patient does not have a Health Care Directive or Living Will: Discussed advance care planning with patient; however, patient declined at this time.    Preoperative Review of :   reviewed - Oxycodone after surgery of right knee 3/9/2023      Status of Chronic Conditions:  See problem list for active medical problems.  Problems all longstanding and stable, except as noted/documented.  See ROS for pertinent symptoms related to these conditions.      Review of  Systems  CONSTITUTIONAL: NEGATIVE for fever, chills, change in weight  INTEGUMENTARY/SKIN: NEGATIVE for worrisome rashes, moles or lesions  EYES: NEGATIVE for vision changes or irritation  ENT/MOUTH: NEGATIVE for ear, mouth and throat problems  RESP: NEGATIVE for significant cough or SOB  CV: NEGATIVE for chest pain, palpitations or peripheral edema  GI: NEGATIVE for nausea, abdominal pain, heartburn, or change in bowel habits  : NEGATIVE for frequency, dysuria, or hematuria  MUSCULOSKELETAL: NEGATIVE for significant arthralgias or myalgia  NEURO: NEGATIVE for weakness, dizziness or paresthesias  ENDOCRINE: NEGATIVE for temperature intolerance, skin/hair changes  HEME: NEGATIVE for bleeding problems  PSYCHIATRIC: NEGATIVE for changes in mood or affect    Patient Active Problem List    Diagnosis Date Noted     Other male erectile dysfunction 07/12/2016     Priority: Medium     Chronic pain of right knee 07/12/2016     Priority: Medium     ED (erectile dysfunction) 09/06/2013     Priority: Medium     Elevated blood pressure reading without diagnosis of hypertension 09/06/2013     Priority: Medium     Family history of colon cancer 09/06/2013     Priority: Medium     BMI 32.0-32.9,adult 09/11/2012     Priority: Medium     CARDIOVASCULAR SCREENING; LDL GOAL LESS THAN 160 10/31/2010     Priority: Medium     Left Lumbar Radiculopathy L4-5 09/01/2010     Priority: Medium     Lumbar spinal stenosis 09/01/2010     Priority: Medium      Past Medical History:   Diagnosis Date     Arthritis      Hypertension      Past Surgical History:   Procedure Laterality Date     ARTHROSCOPY KNEE Right 7/15/2016    Procedure: ARTHROSCOPY KNEE;  Surgeon: Osvaldo Mccoy MD;  Location:  OR     ORTHOPEDIC SURGERY  08.2012    left knee, arthroscopy     ORTHOPEDIC SURGERY  07.15.2016    right knee     Current Outpatient Medications   Medication Sig Dispense Refill     amLODIPine (NORVASC) 10 MG tablet TAKE 1 TABLET (10 MG) BY MOUTH DAILY  90 tablet 2     lisinopril (ZESTRIL) 5 MG tablet Take 1 tablet (5 mg) by mouth daily 90 tablet 2       No Known Allergies     Social History     Tobacco Use     Smoking status: Never     Smokeless tobacco: Current     Types: Chew   Substance Use Topics     Alcohol use: Yes     Family History   Problem Relation Age of Onset     Cancer Mother      Hypertension Father      History   Drug Use No         Objective     /87   Pulse 60   Temp 97.3  F (36.3  C) (Tympanic)   Resp 16   Ht 1.829 m (6')   Wt 105.8 kg (233 lb 3.2 oz)   SpO2 98%   BMI 31.63 kg/m      Physical Exam    GENERAL APPEARANCE: healthy, alert and no distress     EYES: EOMI,  PERRL     HENT: nose and mouth without ulcers or lesions     NECK: no adenopathy, no asymmetry, masses, or scars and thyroid normal to palpation     RESP: lungs clear to auscultation - no rales, rhonchi or wheezes     CV: regular rates and rhythm, normal S1 S2, no S3 or S4 and no murmur, click or rub     ABDOMEN:  soft, nontender, no HSM or masses and bowel sounds normal     MS: extremities normal- no gross deformities noted, no evidence of inflammation in joints, FROM in all extremities.     SKIN: no suspicious lesions or rashes     NEURO: Normal strength and tone, sensory exam grossly normal, mentation intact and speech normal     PSYCH: mentation appears normal. and affect normal/bright     LYMPHATICS: No cervical adenopathy    Recent Labs   Lab Test 02/07/23  1157   HGB 14.8  14.8         POTASSIUM 4.4   CR 0.78        Diagnostics:  Labs pending at this time.  Results will be reviewed when available.   EKG: sinus bradycardia, normal axis, normal intervals, no acute ST/T changes c/w ischemia    Revised Cardiac Risk Index (RCRI):  The patient has the following serious cardiovascular risks for perioperative complications:   - No serious cardiac risks = 0 points     RCRI Interpretation: 0 points: Class I (very low risk - 0.4% complication rate)            Signed Electronically by: Kalin Iniguez PA-C  Copy of this evaluation report is provided to requesting physician.

## 2023-08-07 ENCOUNTER — TRANSFERRED RECORDS (OUTPATIENT)
Dept: HEALTH INFORMATION MANAGEMENT | Facility: CLINIC | Age: 48
End: 2023-08-07
Payer: COMMERCIAL

## 2023-08-22 ENCOUNTER — TRANSFERRED RECORDS (OUTPATIENT)
Dept: HEALTH INFORMATION MANAGEMENT | Facility: CLINIC | Age: 48
End: 2023-08-22
Payer: COMMERCIAL

## 2023-09-11 ENCOUNTER — TELEPHONE (OUTPATIENT)
Dept: FAMILY MEDICINE | Facility: CLINIC | Age: 48
End: 2023-09-11
Payer: COMMERCIAL

## 2023-09-11 NOTE — TELEPHONE ENCOUNTER
Patient Quality Outreach    Patient is due for the following:   Colon Cancer Screening  Physical Preventive Adult Physical      Topic Date Due    Hepatitis B Vaccine (1 of 3 - 3-dose series) Never done    COVID-19 Vaccine (3 - Moderna series) 07/05/2021    Flu Vaccine (1) 09/01/2023    Zoster (Shingles) Vaccine (1 of 2) 07/14/2023       Next Steps:   Schedule a Adult Preventative    Type of outreach:    Sent IT'SUGAR message.      Questions for provider review:               Francy Hatfield MA

## 2023-09-28 ENCOUNTER — TRANSFERRED RECORDS (OUTPATIENT)
Dept: HEALTH INFORMATION MANAGEMENT | Facility: CLINIC | Age: 48
End: 2023-09-28
Payer: COMMERCIAL

## 2023-12-02 DIAGNOSIS — I10 HYPERTENSION GOAL BP (BLOOD PRESSURE) < 140/90: ICD-10-CM

## 2023-12-04 RX ORDER — AMLODIPINE BESYLATE 10 MG/1
10 TABLET ORAL DAILY
Qty: 90 TABLET | Refills: 1 | Status: SHIPPED | OUTPATIENT
Start: 2023-12-04 | End: 2024-05-31

## 2023-12-11 ENCOUNTER — TELEPHONE (OUTPATIENT)
Dept: FAMILY MEDICINE | Facility: CLINIC | Age: 48
End: 2023-12-11
Payer: COMMERCIAL

## 2023-12-11 NOTE — TELEPHONE ENCOUNTER
Patient Quality Outreach    Patient is due for the following:   Colon Cancer Screening  Physical Preventive Adult Physical      Topic Date Due    Hepatitis B Vaccine (1 of 3 - 3-dose series) Never done    Flu Vaccine (1) 09/01/2023    COVID-19 Vaccine (3 - 2023-24 season) 09/01/2023    Zoster (Shingles) Vaccine (1 of 2) 07/14/2023       Next Steps:   Schedule a Adult Preventative    Type of outreach:    Sent Sulia message.      Questions for provider review:               Francy Hatfield MA

## 2024-05-31 DIAGNOSIS — I10 HYPERTENSION GOAL BP (BLOOD PRESSURE) < 140/90: ICD-10-CM

## 2024-05-31 RX ORDER — AMLODIPINE BESYLATE 10 MG/1
10 TABLET ORAL DAILY
Qty: 90 TABLET | Refills: 0 | Status: SHIPPED | OUTPATIENT
Start: 2024-05-31 | End: 2024-08-20

## 2024-06-30 ENCOUNTER — HEALTH MAINTENANCE LETTER (OUTPATIENT)
Age: 49
End: 2024-06-30

## 2024-08-16 SDOH — HEALTH STABILITY: PHYSICAL HEALTH: ON AVERAGE, HOW MANY MINUTES DO YOU ENGAGE IN EXERCISE AT THIS LEVEL?: 50 MIN

## 2024-08-16 SDOH — HEALTH STABILITY: PHYSICAL HEALTH: ON AVERAGE, HOW MANY DAYS PER WEEK DO YOU ENGAGE IN MODERATE TO STRENUOUS EXERCISE (LIKE A BRISK WALK)?: 6 DAYS

## 2024-08-16 ASSESSMENT — SOCIAL DETERMINANTS OF HEALTH (SDOH): HOW OFTEN DO YOU GET TOGETHER WITH FRIENDS OR RELATIVES?: THREE TIMES A WEEK

## 2024-08-20 ENCOUNTER — OFFICE VISIT (OUTPATIENT)
Dept: FAMILY MEDICINE | Facility: CLINIC | Age: 49
End: 2024-08-20
Payer: COMMERCIAL

## 2024-08-20 VITALS
HEIGHT: 72 IN | OXYGEN SATURATION: 99 % | TEMPERATURE: 98.1 F | BODY MASS INDEX: 32.07 KG/M2 | RESPIRATION RATE: 16 BRPM | DIASTOLIC BLOOD PRESSURE: 62 MMHG | WEIGHT: 236.8 LBS | SYSTOLIC BLOOD PRESSURE: 112 MMHG | HEART RATE: 56 BPM

## 2024-08-20 DIAGNOSIS — I10 HYPERTENSION GOAL BP (BLOOD PRESSURE) < 140/90: ICD-10-CM

## 2024-08-20 DIAGNOSIS — Z12.11 SCREEN FOR COLON CANCER: ICD-10-CM

## 2024-08-20 DIAGNOSIS — K40.90 RIGHT INGUINAL HERNIA: ICD-10-CM

## 2024-08-20 DIAGNOSIS — Z13.1 SCREENING FOR DIABETES MELLITUS: ICD-10-CM

## 2024-08-20 DIAGNOSIS — Z12.5 SCREENING FOR PROSTATE CANCER: ICD-10-CM

## 2024-08-20 DIAGNOSIS — Z13.220 LIPID SCREENING: ICD-10-CM

## 2024-08-20 DIAGNOSIS — Z00.00 ROUTINE GENERAL MEDICAL EXAMINATION AT A HEALTH CARE FACILITY: Primary | ICD-10-CM

## 2024-08-20 DIAGNOSIS — R53.83 FATIGUE, UNSPECIFIED TYPE: ICD-10-CM

## 2024-08-20 LAB
ALBUMIN SERPL BCG-MCNC: 4.3 G/DL (ref 3.5–5.2)
ALP SERPL-CCNC: 107 U/L (ref 40–150)
ALT SERPL W P-5'-P-CCNC: 24 U/L (ref 0–70)
ANION GAP SERPL CALCULATED.3IONS-SCNC: 10 MMOL/L (ref 7–15)
AST SERPL W P-5'-P-CCNC: 35 U/L (ref 0–45)
BASOPHILS # BLD AUTO: 0 10E3/UL (ref 0–0.2)
BASOPHILS NFR BLD AUTO: 1 %
BILIRUB SERPL-MCNC: 0.4 MG/DL
BUN SERPL-MCNC: 26.3 MG/DL (ref 6–20)
CALCIUM SERPL-MCNC: 9.2 MG/DL (ref 8.8–10.4)
CHLORIDE SERPL-SCNC: 104 MMOL/L (ref 98–107)
CHOLEST SERPL-MCNC: 200 MG/DL
CREAT SERPL-MCNC: 0.93 MG/DL (ref 0.67–1.17)
EGFRCR SERPLBLD CKD-EPI 2021: >90 ML/MIN/1.73M2
EOSINOPHIL # BLD AUTO: 0.2 10E3/UL (ref 0–0.7)
EOSINOPHIL NFR BLD AUTO: 2 %
ERYTHROCYTE [DISTWIDTH] IN BLOOD BY AUTOMATED COUNT: 12.8 % (ref 10–15)
FASTING STATUS PATIENT QL REPORTED: ABNORMAL
FASTING STATUS PATIENT QL REPORTED: ABNORMAL
GLUCOSE SERPL-MCNC: 96 MG/DL (ref 70–99)
HBA1C MFR BLD: 4.9 % (ref 0–5.6)
HCO3 SERPL-SCNC: 24 MMOL/L (ref 22–29)
HCT VFR BLD AUTO: 43.7 % (ref 40–53)
HDLC SERPL-MCNC: 82 MG/DL
HGB BLD-MCNC: 14.8 G/DL (ref 13.3–17.7)
IMM GRANULOCYTES # BLD: 0 10E3/UL
IMM GRANULOCYTES NFR BLD: 0 %
LDLC SERPL CALC-MCNC: 107 MG/DL
LYMPHOCYTES # BLD AUTO: 2.2 10E3/UL (ref 0.8–5.3)
LYMPHOCYTES NFR BLD AUTO: 31 %
MCH RBC QN AUTO: 30.7 PG (ref 26.5–33)
MCHC RBC AUTO-ENTMCNC: 33.9 G/DL (ref 31.5–36.5)
MCV RBC AUTO: 91 FL (ref 78–100)
MONOCYTES # BLD AUTO: 0.7 10E3/UL (ref 0–1.3)
MONOCYTES NFR BLD AUTO: 9 %
NEUTROPHILS # BLD AUTO: 4.1 10E3/UL (ref 1.6–8.3)
NEUTROPHILS NFR BLD AUTO: 57 %
NONHDLC SERPL-MCNC: 118 MG/DL
PLATELET # BLD AUTO: 200 10E3/UL (ref 150–450)
POTASSIUM SERPL-SCNC: 4.3 MMOL/L (ref 3.4–5.3)
PROT SERPL-MCNC: 7 G/DL (ref 6.4–8.3)
PSA SERPL DL<=0.01 NG/ML-MCNC: 0.33 NG/ML (ref 0–3.5)
RBC # BLD AUTO: 4.82 10E6/UL (ref 4.4–5.9)
SODIUM SERPL-SCNC: 138 MMOL/L (ref 135–145)
TRIGL SERPL-MCNC: 55 MG/DL
WBC # BLD AUTO: 7.2 10E3/UL (ref 4–11)

## 2024-08-20 PROCEDURE — G0103 PSA SCREENING: HCPCS | Performed by: PHYSICIAN ASSISTANT

## 2024-08-20 PROCEDURE — 80053 COMPREHEN METABOLIC PANEL: CPT | Performed by: PHYSICIAN ASSISTANT

## 2024-08-20 PROCEDURE — 84270 ASSAY OF SEX HORMONE GLOBUL: CPT | Performed by: PHYSICIAN ASSISTANT

## 2024-08-20 PROCEDURE — 80061 LIPID PANEL: CPT | Performed by: PHYSICIAN ASSISTANT

## 2024-08-20 PROCEDURE — 84403 ASSAY OF TOTAL TESTOSTERONE: CPT | Performed by: PHYSICIAN ASSISTANT

## 2024-08-20 PROCEDURE — 83036 HEMOGLOBIN GLYCOSYLATED A1C: CPT | Performed by: PHYSICIAN ASSISTANT

## 2024-08-20 PROCEDURE — 99396 PREV VISIT EST AGE 40-64: CPT | Performed by: PHYSICIAN ASSISTANT

## 2024-08-20 PROCEDURE — 85025 COMPLETE CBC W/AUTO DIFF WBC: CPT | Performed by: PHYSICIAN ASSISTANT

## 2024-08-20 PROCEDURE — 99213 OFFICE O/P EST LOW 20 MIN: CPT | Mod: 25 | Performed by: PHYSICIAN ASSISTANT

## 2024-08-20 PROCEDURE — 36415 COLL VENOUS BLD VENIPUNCTURE: CPT | Performed by: PHYSICIAN ASSISTANT

## 2024-08-20 RX ORDER — AMLODIPINE BESYLATE 10 MG/1
10 TABLET ORAL DAILY
Qty: 90 TABLET | Refills: 3 | Status: SHIPPED | OUTPATIENT
Start: 2024-08-20

## 2024-08-20 ASSESSMENT — PAIN SCALES - GENERAL: PAINLEVEL: NO PAIN (0)

## 2024-08-20 NOTE — PROGRESS NOTES
"Preventive Care Visit  [facility]  Kalin Iniguez PA-C, Physician Assistant - Medical  Aug 20, 2024      Assessment & Plan     (Z00.00) Routine general medical examination at a health care facility  (primary encounter diagnosis)  Comment: Here for routine screening.  Plan: CBC with platelets and differential          (Z12.11) Screen for colon cancer  Comment: Discussed screening colonoscopy.  Plan: Colonoscopy Screening  Referral          (R53.83) Fatigue, unspecified type  Comment: Fatigue issues as well as history of erectile dysfunction.  Patient requesting testosterone screening.  Discussed if low following up for recheck early morning.  Otherwise potential for endocrinology  Plan: Testosterone Free and Total          (Z13.220) Lipid screening  Comment: Patient is not fasting.  Plan: Lipid panel reflex to direct LDL Non-fasting          (I10) Hypertension goal BP (blood pressure) < 140/90  Comment: Hypertension.  Blood pressure within goal.  Refill of amlodipine 10 mg.  Plan: Comprehensive metabolic panel (BMP + Alb, Alk         Phos, ALT, AST, Total. Bili, TP), amLODIPine         (NORVASC) 10 MG tablet          (Z12.5) Screening for prostate cancer  Comment: Discussed screening PSA.  Plan: PSA, screen          (K40.90) Right inguinal hernia  Comment: The patient reports multiple years right inguinal hernia.  There is no tenderness or pain with this.  Discussed the risks of ongoing inguinal hernia.  Referral for general surgery placed.  Plan: Adult Gen Surg  Referral          (Z13.1) Screening for diabetes mellitus  Comment: Discussed screening A1c.  Plan: Hemoglobin A1c            BMI  Estimated body mass index is 32.57 kg/m  as calculated from the following:    Height as of this encounter: 1.816 m (5' 11.5\").    Weight as of this encounter: 107.4 kg (236 lb 12.8 oz).   Weight management plan: Discussed healthy diet and exercise guidelines    Counseling  Appropriate " preventive services were addressed with this patient via screening, questionnaire, or discussion as appropriate for fall prevention, nutrition, physical activity, Tobacco-use cessation, social engagement, weight loss and cognition.  Checklist reviewing preventive services available has been given to the patient.  Reviewed patient's diet, addressing concerns and/or questions.   He is at risk for psychosocial distress and has been provided with information to reduce risk.   The patient reports drinking more than 3 alcoholic drinks per day and/or more than 7 drhnks per week. The patient was counseled and given information about possible harmful effects of excessive alcohol intake.    Adolfo Rollins is a 49 year old, presenting for the following:  Physical and LAB REQUEST (Recheck Testosterone Level. )        8/20/2024    10:08 AM   Additional Questions   Roomed by Francy Hatfield   Accompanied by SELF        Health Care Directive  Patient does not have a Health Care Directive or Living Will: Discussed advance care planning with patient; however, patient declined at this time.    HPI    HTN : Amlodipine 10 mg no leg swelling.  Patient stopped taking lisinopril.  Denies any side effects with medications.    No famiyl history of prostate or colon cancer     Chew tobacco -intermittently quits.    [place] trip upcoming     Intermittent fatigue issues.  Patient has also had some difficulties maintaining and achieving erections.  Requesting testosterone testing.  Has been using erectile medications through online process.     Healing well from knee replacements.        8/16/2024   General Health   How would you rate your overall physical health? Good   Feel stress (tense, anxious, or unable to sleep) Only a little      (!) STRESS CONCERN      8/16/2024   Nutrition   Three or more servings of calcium each day? Yes   Diet: Regular (no restrictions)   How many servings of fruit and vegetables per day? (!) 2-3   How many  sweetened beverages each day? 0-1          8/16/2024   Exercise   Days per week of moderate/strenous exercise 6 days   Average minutes spent exercising at this level 50 min          8/16/2024   Social Factors   Frequency of gathering with friends or relatives Three times a week   Worry food won't last until get money to buy more No   Food not last or not have enough money for food? No   Do you have housing? (Housing is defined as stable permanent housing and does not include staying ouside in a car, in a tent, in an abandoned building, in an overnight shelter, or couch-surfing.) Yes   Are you worried about losing your housing? No   Lack of transportation? No   Unable to get utilities (heat,electricity)? No            8/16/2024   Fall Risk   Fallen 2 or more times in the past year? No   Trouble with walking or balance? No             8/16/2024   Dental   Dentist two times every year? Yes            8/16/2024   TB Screening   Were you born outside of the [place]? No            Today's PHQ-2 Score:       8/20/2024     9:54 AM   PHQ-2 ( 1999 [employer])   Q1: Little interest or pleasure in doing things 1   Q2: Feeling down, depressed or hopeless 0   PHQ-2 Score 1   Q1: Little interest or pleasure in doing things Several days   Q2: Feeling down, depressed or hopeless Not at all   PHQ-2 Score 1           8/16/2024   Substance Use   Alcohol more than 3/day or more than 7/wk Yes   How often do you have a drink containing alcohol 2 to 3 times a week   How many alcohol drinks on typical day 3 or 4   How often do you have 5+ drinks at one occasion Less than monthly   Audit 2/3 Score 2   How often not able to stop drinking once started Never   How often failed to do what normally expected Never   How often needed first drink in am after a heavy drinking session Never   How often feeling of guilt or remorse after drinking Never   How often unable to remember what happened the night before Never   Have you or someone else been injured  "because of your drinking No   Has anyone been concerned or suggested you cut down on drinking No   TOTAL SCORE - AUDIT 5   Do you use any other substances recreationally? No        Social History     Tobacco Use    Smoking status: Former     Current packs/day: 0.50     Average packs/day: 0.5 packs/day for 10.0 years (5.0 ttl pk-yrs)     Types: Cigarettes    Smokeless tobacco: Current     Types: Chew   Vaping Use    Vaping status: Never Used   Substance Use Topics    Alcohol use: Yes    Drug use: No           8/16/2024   One time HIV Screening   Previous HIV test? I don't know          8/16/2024   STI Screening   New sexual partner(s) since last STI/HIV test? No      ASCVD Risk   The 10-year ASCVD risk score (Mila MITTAL, et al., 2019) is: 1.6%    Values used to calculate the score:      Age: 49 years      Sex: Male      Is Non- : No      Diabetic: No      Tobacco smoker: No      Systolic Blood Pressure: 112 mmHg      Is BP treated: Yes      HDL Cholesterol: 98 mg/dL      Total Cholesterol: 183 mg/dL           Reviewed and updated as needed this visit by Provider                    Past Medical History:   Diagnosis Date    Arthritis     Hypertension      Past Surgical History:   Procedure Laterality Date    ARTHROSCOPY KNEE Right 7/15/2016    Procedure: ARTHROSCOPY KNEE;  Surgeon: Osvaldo Mccoy MD;  Location:  OR    ORTHOPEDIC SURGERY  08.2012    left knee, arthroscopy    ORTHOPEDIC SURGERY  07.15.2016    right knee         Review of Systems  Constitutional, HEENT, cardiovascular, pulmonary, gi and gu systems are negative, except as otherwise noted.     Objective    Exam  /62   Pulse 56   Temp 98.1  F (36.7  C) (Tympanic)   Resp 16   Ht 1.816 m (5' 11.5\")   Wt 107.4 kg (236 lb 12.8 oz)   SpO2 99%   BMI 32.57 kg/m     Estimated body mass index is 32.57 kg/m  as calculated from the following:    Height as of this encounter: 1.816 m (5' 11.5\").    Weight as of this " encounter: 107.4 kg (236 lb 12.8 oz).    Physical Exam  GENERAL: alert, no distress, and over weight  EYES: Eyes grossly normal to inspection, PERRL and conjunctivae and sclerae normal  HENT: ear canals and TM's normal, nose and mouth without ulcers or lesions  NECK: no adenopathy, no asymmetry, masses, or scars  RESP: lungs clear to auscultation - no rales, rhonchi or wheezes  CV: regular rate and rhythm, normal S1 S2, no S3 or S4, no murmur, click or rub, no peripheral edema  ABDOMEN: soft, nontender, without hepatosplenomegaly or masses, bowel sounds normal, and right inguinal hernia no tenderness with palpation  MS: no gross musculoskeletal defects noted, no edema  SKIN: no suspicious lesions or rashes  NEURO: Normal strength and tone, mentation intact and speech normal  PSYCH: mentation appears normal, affect normal/bright        Signed Electronically by: Kalin Iniguez PA-C

## 2024-08-20 NOTE — PATIENT INSTRUCTIONS
Patient Education   Preventive Care Advice   This is general advice given by our system to help you stay healthy. However, your care team may have specific advice just for you. Please talk to your care team about your preventive care needs.  Nutrition  Eat 5 or more servings of fruits and vegetables each day.  Try wheat bread, brown rice and whole grain pasta (instead of white bread, rice, and pasta).  Get enough calcium and vitamin D. Check the label on foods and aim for 100% of the RDA (recommended daily allowance).  Lifestyle  Exercise at least 150 minutes each week  (30 minutes a day, 5 days a week).  Do muscle strengthening activities 2 days a week. These help control your weight and prevent disease.  No smoking.  Wear sunscreen to prevent skin cancer.  Have a dental exam and cleaning every 6 months.  Yearly exams  See your health care team every year to talk about:  Any changes in your health.  Any medicines your care team has prescribed.  Preventive care, family planning, and ways to prevent chronic diseases.  Shots (vaccines)   HPV shots (up to age 26), if you've never had them before.  Hepatitis B shots (up to age 59), if you've never had them before.  COVID-19 shot: Get this shot when it's due.  Flu shot: Get a flu shot every year.  Tetanus shot: Get a tetanus shot every 10 years.  Pneumococcal, hepatitis A, and RSV shots: Ask your care team if you need these based on your risk.  Shingles shot (for age 50 and up)  General health tests  Diabetes screening:  Starting at age 35, Get screened for diabetes at least every 3 years.  If you are younger than age 35, ask your care team if you should be screened for diabetes.  Cholesterol test: At age 39, start having a cholesterol test every 5 years, or more often if advised.  Bone density scan (DEXA): At age 50, ask your care team if you should have this scan for osteoporosis (brittle bones).  Hepatitis C: Get tested at least once in your life.  STIs (sexually  transmitted infections)  Before age 24: Ask your care team if you should be screened for STIs.  After age 24: Get screened for STIs if you're at risk. You are at risk for STIs (including HIV) if:  You are sexually active with more than one person.  You don't use condoms every time.  You or a partner was diagnosed with a sexually transmitted infection.  If you are at risk for HIV, ask about PrEP medicine to prevent HIV.  Get tested for HIV at least once in your life, whether you are at risk for HIV or not.  Cancer screening tests  Cervical cancer screening: If you have a cervix, begin getting regular cervical cancer screening tests starting at age 21.  Breast cancer scan (mammogram): If you've ever had breasts, begin having regular mammograms starting at age 40. This is a scan to check for breast cancer.  Colon cancer screening: It is important to start screening for colon cancer at age 45.  Have a colonoscopy test every 10 years (or more often if you're at risk) Or, ask your provider about stool tests like a FIT test every year or Cologuard test every 3 years.  To learn more about your testing options, visit:   .  For help making a decision, visit:   https://bit.ly/sw26469.  Prostate cancer screening test: If you have a prostate, ask your care team if a prostate cancer screening test (PSA) at age 55 is right for you.  Lung cancer screening: If you are a current or former smoker ages 50 to 80, ask your care team if ongoing lung cancer screenings are right for you.  For informational purposes only. Not to replace the advice of your health care provider. Copyright   2023 [facility]. All rights reserved. Clinically reviewed by the [facility]. Vuze [id number] - REV 01/24.  9 Ways to Cut Back on Drinking  Maybe you've found yourself drinking more alcohol than you'd prefer. If you want to cut back, here are some ideas to try.    Think before you drink.  Do you really want a drink,  "or is it just a habit? If you're used to having a drink at a certain time, try doing something else then.     Look for substitutes.  Find some no-alcohol drinks that you enjoy, like flavored seltzer water, tea with honey, or tonic with a slice of lime. Or try alcohol-free beer or \"virgin\" cocktails (without the alcohol).     Drink more water.  Use water to quench your thirst. Drink a glass of water before you have any alcohol. Have another glass along with every drink or between drinks.     Shrink your drink.  For example, have a bottle of beer instead of a pint. Use a smaller glass for wine. Choose drinks with lower alcohol content (ABV%). Or use less liquor and more mixer in cocktails.     Slow down.  It's easy to drink quickly and without thinking about it. Pay attention, and make each drink last longer.     Do the math.  Total up how much you spend on alcohol each month. How much is that a year? If you cut back, what could you do with the money you save?     Take a break.  Choose a day or two each week when you won't drink at all. Notice how you feel on those days, physically and emotionally. How did you sleep? Do you feel better? Over time, add more break days.     Count calories.  Would you like to lose some weight? For some people that's a good motivator for cutting back. Figure out how many calories are in each drink. How many does that add up to in a day? In a week? In a month?     Practice saying no.  Be ready when someone offers you a drink. Try: \"Thanks, I've had enough.\" Or \"Thanks, but I'm cutting back.\" Or \"No, thanks. I feel better when I drink less.\"   Current as of: November 15, 2023  Content Version: 14.1 2006-2024 [employer].   Care instructions adapted under license by your healthcare professional. If you have questions about a medical condition or this instruction, always ask your healthcare professional. [employer] disclaims any warranty or liability for your use " of this information.

## 2024-08-21 LAB — SHBG SERPL-SCNC: 55 NMOL/L (ref 11–80)

## 2024-08-23 LAB
TESTOST FREE SERPL-MCNC: 8.84 NG/DL
TESTOST SERPL-MCNC: 579 NG/DL (ref 240–950)

## 2024-09-12 ENCOUNTER — TELEPHONE (OUTPATIENT)
Dept: GASTROENTEROLOGY | Facility: CLINIC | Age: 49
End: 2024-09-12
Payer: COMMERCIAL

## 2024-10-03 ENCOUNTER — TELEPHONE (OUTPATIENT)
Dept: GASTROENTEROLOGY | Facility: CLINIC | Age: 49
End: 2024-10-03
Payer: COMMERCIAL

## 2024-10-03 NOTE — TELEPHONE ENCOUNTER
First Attempt to contact patient in order to complete pre assessment questions.     Pt unable to talk at this time. He will callback.    Callback required communication sent via Channelkit.    --------------------------------------------------------------------------------------------------------------------      Procedure details:    Patient scheduled for Colonoscopy on 10/15/24.     Arrival time: 0900. Procedure time 0945    Facility location: [facility] [place]; [address], [place] [address]. Check in location: 2nd Floor at Surgery desk.    Sedation type: Conscious sedation - Discuss sedation. Per last OV (8/20/24) pt reports drinking more than 3 alcoholic beverages day/7 week     Pre op exam needed? No.    Indication for procedure: Screening       Chart review:     Electronic implanted devices? No    Recent diagnosis of diverticulitis within the last 6 weeks? No      Medication review:    Diabetic? No    Anticoagulants? No    Weight loss medication/injectable? No GLP-1 medication per patient's medication list.  RN will verify with pre-assessment call.    Other medication HOLDING recommendations:  N/A      Prep for procedure:     Bowel prep recommendation: Standard Miralax  Due to: standard bowel prep.    Prep instructions sent via Channelkit         Adry Rosenthal RN  Endoscopy Procedure Pre Assessment RN  [phone] option 2

## 2024-10-03 NOTE — TELEPHONE ENCOUNTER
Caller: Writer to pt     Reason for Reschedule/Cancellation   (please be detailed, any staff messages or encounters to note?): pt needs Mac per RN d/t alcohol use      Prior to reschedule please review:  Ordering Provider: Kalin Iniguez PA-C  Sedation Determined: MAC  Does patient have any ASC Exclusions, please identify?: No      Notes on Cancelled Procedure:  Procedure: Lower Endoscopy [Colonoscopy]   Date: 10/15/2024  Location: [facility] [place]; [address] [place] [address], [address], [place] [id number]   Surgeon: KELLY      Rescheduled: Yes,   Procedure: Lower Endoscopy [Colonoscopy]    Date: 10/29/2024   Location: [facility] [place]; [address], [address], [place] [address]   Surgeon: BABAK OMER   Sedation Level Scheduled  MAC ,  Reason for Sedation Level Per RN   Instructions updated and sent: Yes, Mychart     Does patient need PAC or Pre -Op Rescheduled? : No       Did you cancel or rescheduled an EUS procedure? No.

## 2024-10-03 NOTE — TELEPHONE ENCOUNTER
The Pt called back. He does drink alcohol on a regular basis, drinking about 4-5 days per week, up to 6 drinks per day. Advised MAC sedation. Pt is in agreement with this plan.     Will send message to scheduling to reach out to the Pt to reschedule with MAC.     Leelee Hughes RN   Endoscopy Procedure Pre Assessment RN

## 2024-10-16 NOTE — TELEPHONE ENCOUNTER
Rescheduled Colonoscopy  Due to needing MAC sedation.    Pre visit planning completed.      Procedure details:    Patient scheduled for Colonoscopy on 10/29/24.     Arrival time: 1315. Procedure time 1415    Facility location: [facility]; [address] [place] [address], [address] Floor, [place] [address]. Check in location: 5th Floor.    Sedation type: MAC    Pre op exam needed? No.    Indication for procedure: screening colonoscopy       Chart review:     Electronic implanted devices? No    Recent diagnosis of diverticulitis within the last 6 weeks? No      Medication review:    Diabetic? No    Anticoagulants? No    Weight loss medication/injectable? No GLP-1 medication per patient's medication list.  RN will verify with pre-assessment call.    Other medication HOLDING recommendations:  N/A      Prep for procedure:     Bowel prep recommendation: Standard Miralax  Due to: standard bowel prep.    Prep instructions sent via thom Iniguez RN  Endoscopy Procedure Pre Assessment   [phone] option 2

## 2024-10-22 NOTE — TELEPHONE ENCOUNTER
Pre assessment completed for upcoming procedure.   (Please see previous telephone encounter notes for complete details)    Procedure details:    Arrival time and facility location reviewed.    Pre op exam needed? No.    Designated  policy reviewed. Instructed to have someone stay 6 hours post procedure.     COVID policy reviewed.      Medication review:    Medications reviewed. Please see supporting documentation below. Holding recommendations discussed (if applicable).       Prep for procedure:     Procedure prep instructions reviewed.        Additional information needed?  N/A      Patient  verbalized understanding and had no questions or concerns at this time.      Corinne Kliber, RN  Endoscopy Procedure Pre Assessment   [phone] option 4

## 2024-10-29 ENCOUNTER — ANESTHESIA EVENT (OUTPATIENT)
Dept: SURGERY | Facility: AMBULATORY SURGERY CENTER | Age: 49
End: 2024-10-29
Payer: COMMERCIAL

## 2024-10-29 ENCOUNTER — HOSPITAL ENCOUNTER (OUTPATIENT)
Facility: AMBULATORY SURGERY CENTER | Age: 49
Discharge: HOME | End: 2024-10-29
Attending: STUDENT IN AN ORGANIZED HEALTH CARE EDUCATION/TRAINING PROGRAM | Admitting: STUDENT IN AN ORGANIZED HEALTH CARE EDUCATION/TRAINING PROGRAM
Payer: COMMERCIAL

## 2024-10-29 ENCOUNTER — ANESTHESIA (OUTPATIENT)
Dept: SURGERY | Facility: AMBULATORY SURGERY CENTER | Age: 49
End: 2024-10-29
Payer: COMMERCIAL

## 2024-10-29 VITALS
TEMPERATURE: 97.2 F | HEIGHT: 72 IN | WEIGHT: 225 LBS | SYSTOLIC BLOOD PRESSURE: 111 MMHG | RESPIRATION RATE: 16 BRPM | HEART RATE: 54 BPM | DIASTOLIC BLOOD PRESSURE: 80 MMHG | OXYGEN SATURATION: 97 % | BODY MASS INDEX: 30.48 KG/M2

## 2024-10-29 VITALS — HEART RATE: 66 BPM

## 2024-10-29 LAB — COLONOSCOPY: NORMAL

## 2024-10-29 PROCEDURE — 45378 DIAGNOSTIC COLONOSCOPY: CPT | Mod: 33

## 2024-10-29 PROCEDURE — 45378 DIAGNOSTIC COLONOSCOPY: CPT | Performed by: STUDENT IN AN ORGANIZED HEALTH CARE EDUCATION/TRAINING PROGRAM

## 2024-10-29 PROCEDURE — 45378 DIAGNOSTIC COLONOSCOPY: CPT | Performed by: NURSE ANESTHETIST, CERTIFIED REGISTERED

## 2024-10-29 RX ORDER — PROPOFOL 10 MG/ML
INJECTION, EMULSION INTRAVENOUS PRN
Status: DISCONTINUED | OUTPATIENT
Start: 2024-10-29 | End: 2024-10-29

## 2024-10-29 RX ORDER — LIDOCAINE HYDROCHLORIDE 20 MG/ML
INJECTION, SOLUTION INFILTRATION; PERINEURAL PRN
Status: DISCONTINUED | OUTPATIENT
Start: 2024-10-29 | End: 2024-10-29

## 2024-10-29 RX ORDER — SODIUM CHLORIDE, SODIUM LACTATE, POTASSIUM CHLORIDE, CALCIUM CHLORIDE 600; 310; 30; 20 MG/100ML; MG/100ML; MG/100ML; MG/100ML
INJECTION, SOLUTION INTRAVENOUS CONTINUOUS PRN
Status: DISCONTINUED | OUTPATIENT
Start: 2024-10-29 | End: 2024-10-29

## 2024-10-29 RX ORDER — PROPOFOL 10 MG/ML
INJECTION, EMULSION INTRAVENOUS CONTINUOUS PRN
Status: DISCONTINUED | OUTPATIENT
Start: 2024-10-29 | End: 2024-10-29

## 2024-10-29 RX ADMIN — PROPOFOL 150 MCG/KG/MIN: 10 INJECTION, EMULSION INTRAVENOUS at 15:35

## 2024-10-29 RX ADMIN — SODIUM CHLORIDE, SODIUM LACTATE, POTASSIUM CHLORIDE, CALCIUM CHLORIDE: 600; 310; 30; 20 INJECTION, SOLUTION INTRAVENOUS at 15:30

## 2024-10-29 RX ADMIN — PROPOFOL 80 MG: 10 INJECTION, EMULSION INTRAVENOUS at 15:35

## 2024-10-29 RX ADMIN — LIDOCAINE HYDROCHLORIDE 80 MG: 20 INJECTION, SOLUTION INFILTRATION; PERINEURAL at 15:35

## 2024-10-29 RX ADMIN — PROPOFOL 250 MCG/KG/MIN: 10 INJECTION, EMULSION INTRAVENOUS at 15:44

## 2024-10-29 ASSESSMENT — LIFESTYLE VARIABLES: TOBACCO_USE: 1

## 2024-10-29 NOTE — H&P
Endoscopy History and Physical    Ayo Hernández  [id number]  male  49 year old      Reason for procedure/surgery: Screening colonoscopy. No prior colonoscopy. FIT negative in 2017.    Mother had colon cancer age 70s.    Patient Active Problem List   Diagnosis    Left Lumbar Radiculopathy L4-5    Lumbar spinal stenosis    CARDIOVASCULAR SCREENING; LDL GOAL LESS THAN 160    BMI 32.0-32.9,adult    ED (erectile dysfunction)    Elevated blood pressure reading without diagnosis of hypertension    Family history of colon cancer    Other male erectile dysfunction    Chronic pain of right knee    Tear of lateral cartilage or meniscus of knee, current       Past Surgical History:    Past Surgical History:   Procedure Laterality Date    ARTHROSCOPY KNEE Right 7/15/2016    Procedure: ARTHROSCOPY KNEE;  Surgeon: Osvaldo Mccoy MD;  Location: [facility] OR    ORTHOPEDIC SURGERY  08.2012    left knee, arthroscopy    ORTHOPEDIC SURGERY  07.15.2016    right knee       Past Medical History:   Past Medical History:   Diagnosis Date    Arthritis     Hypertension        Social History:   Social History     Tobacco Use    Smoking status: Former     Current packs/day: 0.50     Average packs/day: 0.5 packs/day for 10.0 years (5.0 ttl pk-yrs)     Types: Cigarettes    Smokeless tobacco: Current     Types: Chew   Substance Use Topics    Alcohol use: Yes       Family History:   Family History   Problem Relation Age of Onset    Cancer Mother     Hypertension Father        Allergies: No Known Allergies    Active Medications:   Current Outpatient Medications   Medication Sig Dispense Refill    amLODIPine (NORVASC) 10 MG tablet Take 1 tablet (10 mg) by mouth daily 90 tablet 3       Systemic Review:   CONSTITUTIONAL: NEGATIVE for fever, chills, change in weight  ENT/MOUTH: NEGATIVE for ear, mouth and throat problems  RESP: NEGATIVE for significant cough or SOB  CV: NEGATIVE for chest pain, palpitations or peripheral edema    Physical Examination:    Vital Signs: BP (!) 151/89 (BP Location: Right arm)   Pulse 57   Temp 97.6  F (36.4  C) (Temporal)   Resp 18   Ht 1.829 m (6')   Wt 102.1 kg (225 lb)   SpO2 97%   BMI 30.52 kg/m    GENERAL: healthy, alert and no distress  NECK: no adenopathy, no asymmetry, masses, or scars  RESP: lungs clear to auscultation - no rales, rhonchi or wheezes  CV: regular rate and rhythm, normal S1 S2, no peripheral edema and peripheral pulses strong  ABDOMEN: soft, nontender, no masses  MS: no gross musculoskeletal defects noted, no edema    Plan: Appropriate to proceed as scheduled.      Isabela Navarrete MD  10/29/2024

## 2024-10-29 NOTE — ANESTHESIA POSTPROCEDURE EVALUATION
Patient: Ayo Hernández    Procedure: Procedure(s):  Colonoscopy       Anesthesia Type:  MAC    Note:  Disposition: Outpatient   Postop Pain Control: Uneventful            Sign Out: Well controlled pain   PONV: No   Neuro/Psych: Uneventful            Sign Out: Acceptable/Baseline neuro status   Airway/Respiratory: Uneventful            Sign Out: Acceptable/Baseline resp. status   CV/Hemodynamics: Uneventful            Sign Out: Acceptable CV status; No obvious hypovolemia; No obvious fluid overload   Other NRE: NONE   DID A NON-ROUTINE EVENT OCCUR? No           Last vitals:  Vitals Value Taken Time   /80 10/29/24 1632   Temp 36.2  C (97.2  F) 10/29/24 1632   Pulse 54 10/29/24 1632   Resp 16 10/29/24 1632   SpO2 97 % 10/29/24 1632       Electronically Signed By: Giovanni Infante MD  October 29, 2024  5:31 PM

## 2024-10-29 NOTE — ANESTHESIA PREPROCEDURE EVALUATION
Anesthesia Pre-Procedure Evaluation    Patient: Ayo Hernández   MRN: [id number] : 1975        Procedure : Procedure(s):  Colonoscopy          Past Medical History:   Diagnosis Date    Arthritis     Hypertension       Past Surgical History:   Procedure Laterality Date    ARTHROSCOPY KNEE Right 7/15/2016    Procedure: ARTHROSCOPY KNEE;  Surgeon: Osvaldo Mccoy MD;  Location:  OR    ORTHOPEDIC SURGERY      left knee, arthroscopy    ORTHOPEDIC SURGERY  07.15.2016    right knee      No Known Allergies   Social History     Tobacco Use    Smoking status: Former     Current packs/day: 0.50     Average packs/day: 0.5 packs/day for 10.0 years (5.0 ttl pk-yrs)     Types: Cigarettes    Smokeless tobacco: Current     Types: Chew   Substance Use Topics    Alcohol use: Yes      Wt Readings from Last 1 Encounters:   10/29/24 102.1 kg (225 lb)        Anesthesia Evaluation   Pt has had prior anesthetic.     No history of anesthetic complications       ROS/MED HX  ENT/Pulmonary:     (+)                tobacco use,                        Neurologic:       Cardiovascular:     (+)  hypertension- -   -  - -                                      METS/Exercise Tolerance:     Hematologic:       Musculoskeletal:       GI/Hepatic:       Renal/Genitourinary:       Endo:       Psychiatric/Substance Use:       Infectious Disease:       Malignancy:       Other:            Physical Exam    Airway        Mallampati: II   TM distance: > 3 FB   Neck ROM: full   Mouth opening: > 3 cm    Respiratory Devices and Support         Dental       (+) Completely normal teeth      Cardiovascular   cardiovascular exam normal          Pulmonary   pulmonary exam normal                OUTSIDE LABS:  CBC:   Lab Results   Component Value Date    WBC 7.2 2024    WBC 6.7 2023    HGB 14.8 2024    HGB 14.0 2023    HCT 43.7 2024    HCT 41.9 2023     2024     2023     BMP:   Lab Results  "  Component Value Date     08/20/2024     07/18/2023    POTASSIUM 4.3 08/20/2024    POTASSIUM 4.5 07/18/2023    CHLORIDE 104 08/20/2024    CHLORIDE 104 07/18/2023    CO2 24 08/20/2024    CO2 25 07/18/2023    BUN 26.3 (H) 08/20/2024    BUN 21.7 (H) 07/18/2023    CR 0.93 08/20/2024    CR 0.87 07/18/2023    GLC 96 08/20/2024    GLC 92 07/18/2023     COAGS: No results found for: \"PTT\", \"INR\", \"FIBR\"  POC: No results found for: \"BGM\", \"HCG\", \"HCGS\"  HEPATIC:   Lab Results   Component Value Date    ALBUMIN 4.3 08/20/2024    PROTTOTAL 7.0 08/20/2024    ALT 24 08/20/2024    AST 35 08/20/2024    ALKPHOS 107 08/20/2024    BILITOTAL 0.4 08/20/2024     OTHER:   Lab Results   Component Value Date    A1C 4.9 08/20/2024    EMILIANO 9.2 08/20/2024    PHOS 2.6 07/09/2021       Anesthesia Plan    ASA Status:  2    NPO Status:  NPO Appropriate    Anesthesia Type: MAC.   Induction: Propofol.   Maintenance: TIVA.        Consents    Anesthesia Plan(s) and associated risks, benefits, and realistic alternatives discussed. Questions answered and patient/representative(s) expressed understanding.     - Discussed: Risks, Benefits and Alternatives for BOTH SEDATION and the PROCEDURE were discussed     - Discussed with:  Patient            Postoperative Care    Pain management: IV analgesics, Oral pain medications.   PONV prophylaxis: Ondansetron (or other 5HT-3), Dexamethasone or Solumedrol     Comments:               Giovanni Infante MD    I have reviewed the pertinent notes and labs in the chart from the past 30 days and (re)examined the patient.  Any updates or changes from those notes are reflected in this note.                       # Obesity: Estimated body mass index is 30.52 kg/m  as calculated from the following:    Height as of this encounter: 1.829 m (6').    Weight as of this encounter: 102.1 kg (225 lb).             "

## 2024-10-29 NOTE — ANESTHESIA CARE TRANSFER NOTE
Patient: Ayo Hernández    Procedure: Procedure(s):  Colonoscopy       Diagnosis: Screen for colon cancer [Z12.11]  Diagnosis Additional Information: No value filed.    Anesthesia Type:   MAC     Note:    Oropharynx: oropharynx clear of all foreign objects and spontaneously breathing  Level of Consciousness: drowsy  Oxygen Supplementation: room air    Independent Airway: airway patency satisfactory and stable  Dentition: dentition unchanged  Vital Signs Stable: post-procedure vital signs reviewed and stable  Report to RN Given: handoff report given  Patient transferred to: Phase II    Handoff Report: Identifed the Patient, Identified the Reponsible Provider, Reviewed the pertinent medical history, Discussed the surgical course, Reviewed Intra-OP anesthesia mangement and issues during anesthesia, Set expectations for post-procedure period and Allowed opportunity for questions and acknowledgement of understanding      Vitals:  Vitals Value Taken Time   BP     Temp     Pulse 63    Resp 13    SpO2 94%    Vital signs per nursing documentation.       Electronically Signed By: THERESE Rg CRNA  October 29, 2024  4:15 PM

## 2025-03-25 ENCOUNTER — OFFICE VISIT (OUTPATIENT)
Dept: FAMILY MEDICINE | Facility: CLINIC | Age: 50
End: 2025-03-25
Payer: COMMERCIAL

## 2025-03-25 VITALS
WEIGHT: 238.4 LBS | HEIGHT: 72 IN | TEMPERATURE: 98.1 F | BODY MASS INDEX: 32.29 KG/M2 | SYSTOLIC BLOOD PRESSURE: 126 MMHG | RESPIRATION RATE: 16 BRPM | OXYGEN SATURATION: 97 % | DIASTOLIC BLOOD PRESSURE: 72 MMHG | HEART RATE: 58 BPM

## 2025-03-25 DIAGNOSIS — K40.90 RIGHT INGUINAL HERNIA: ICD-10-CM

## 2025-03-25 DIAGNOSIS — F17.200 NICOTINE DEPENDENCE, UNCOMPLICATED, UNSPECIFIED NICOTINE PRODUCT TYPE: ICD-10-CM

## 2025-03-25 DIAGNOSIS — I10 HYPERTENSION GOAL BP (BLOOD PRESSURE) < 140/90: ICD-10-CM

## 2025-03-25 DIAGNOSIS — Z00.00 ROUTINE GENERAL MEDICAL EXAMINATION AT A HEALTH CARE FACILITY: Primary | ICD-10-CM

## 2025-03-25 DIAGNOSIS — Z13.220 LIPID SCREENING: ICD-10-CM

## 2025-03-25 PROCEDURE — 80061 LIPID PANEL: CPT | Performed by: PHYSICIAN ASSISTANT

## 2025-03-25 PROCEDURE — 3074F SYST BP LT 130 MM HG: CPT | Performed by: PHYSICIAN ASSISTANT

## 2025-03-25 PROCEDURE — 1126F AMNT PAIN NOTED NONE PRSNT: CPT | Performed by: PHYSICIAN ASSISTANT

## 2025-03-25 PROCEDURE — 3078F DIAST BP <80 MM HG: CPT | Performed by: PHYSICIAN ASSISTANT

## 2025-03-25 PROCEDURE — 80053 COMPREHEN METABOLIC PANEL: CPT | Performed by: PHYSICIAN ASSISTANT

## 2025-03-25 PROCEDURE — 36415 COLL VENOUS BLD VENIPUNCTURE: CPT | Performed by: PHYSICIAN ASSISTANT

## 2025-03-25 PROCEDURE — 99396 PREV VISIT EST AGE 40-64: CPT | Performed by: PHYSICIAN ASSISTANT

## 2025-03-25 RX ORDER — AMLODIPINE BESYLATE 10 MG/1
10 TABLET ORAL DAILY
Qty: 90 TABLET | Refills: 3 | Status: SHIPPED | OUTPATIENT
Start: 2025-03-25

## 2025-03-25 SDOH — HEALTH STABILITY: PHYSICAL HEALTH: ON AVERAGE, HOW MANY DAYS PER WEEK DO YOU ENGAGE IN MODERATE TO STRENUOUS EXERCISE (LIKE A BRISK WALK)?: 5 DAYS

## 2025-03-25 SDOH — HEALTH STABILITY: PHYSICAL HEALTH: ON AVERAGE, HOW MANY MINUTES DO YOU ENGAGE IN EXERCISE AT THIS LEVEL?: 40 MIN

## 2025-03-25 ASSESSMENT — PAIN SCALES - GENERAL: PAINLEVEL_OUTOF10: NO PAIN (0)

## 2025-03-25 ASSESSMENT — SOCIAL DETERMINANTS OF HEALTH (SDOH): HOW OFTEN DO YOU GET TOGETHER WITH FRIENDS OR RELATIVES?: ONCE A WEEK

## 2025-03-25 NOTE — PATIENT INSTRUCTIONS
Adult Gen Surg  Referral        Comment: Please be aware that coverage of these services is subject to the terms and limitations of your health insurance plan.  Call member services at your health plan with any benefit or coverage questions.   [facility] will call you to coordinate your care as prescribed by your provider. If you don't hear from a representative within 2 business days, please call [phone].         Patient Education   Preventive Care Advice   This is general advice given by our system to help you stay healthy. However, your care team may have specific advice just for you. Please talk to your care team about your preventive care needs.  Nutrition  Eat 5 or more servings of fruits and vegetables each day.  Try wheat bread, brown rice and whole grain pasta (instead of white bread, rice, and pasta).  Get enough calcium and vitamin D. Check the label on foods and aim for 100% of the RDA (recommended daily allowance).  Lifestyle  Exercise at least 150 minutes each week  (30 minutes a day, 5 days a week).  Do muscle strengthening activities 2 days a week. These help control your weight and prevent disease.  No smoking.  Wear sunscreen to prevent skin cancer.  Have a dental exam and cleaning every 6 months.  Yearly exams  See your health care team every year to talk about:  Any changes in your health.  Any medicines your care team has prescribed.  Preventive care, family planning, and ways to prevent chronic diseases.  Shots (vaccines)   HPV shots (up to age 26), if you've never had them before.  Hepatitis B shots (up to age 59), if you've never had them before.  COVID-19 shot: Get this shot when it's due.  Flu shot: Get a flu shot every year.  Tetanus shot: Get a tetanus shot every 10 years.  Pneumococcal, hepatitis A, and RSV shots: Ask your care team if you need these based on your risk.  Shingles shot (for age 50 and up)  General health tests  Diabetes screening:  Starting at age  35, Get screened for diabetes at least every 3 years.  If you are younger than age 35, ask your care team if you should be screened for diabetes.  Cholesterol test: At age 39, start having a cholesterol test every 5 years, or more often if advised.  Bone density scan (DEXA): At age 50, ask your care team if you should have this scan for osteoporosis (brittle bones).  Hepatitis C: Get tested at least once in your life.  STIs (sexually transmitted infections)  Before age 24: Ask your care team if you should be screened for STIs.  After age 24: Get screened for STIs if you're at risk. You are at risk for STIs (including HIV) if:  You are sexually active with more than one person.  You don't use condoms every time.  You or a partner was diagnosed with a sexually transmitted infection.  If you are at risk for HIV, ask about PrEP medicine to prevent HIV.  Get tested for HIV at least once in your life, whether you are at risk for HIV or not.  Cancer screening tests  Cervical cancer screening: If you have a cervix, begin getting regular cervical cancer screening tests starting at age 21.  Breast cancer scan (mammogram): If you've ever had breasts, begin having regular mammograms starting at age 40. This is a scan to check for breast cancer.  Colon cancer screening: It is important to start screening for colon cancer at age 45.  Have a colonoscopy test every 10 years (or more often if you're at risk) Or, ask your provider about stool tests like a FIT test every year or Cologuard test every 3 years.  To learn more about your testing options, visit:   .  For help making a decision, visit:   https://bit.ly/sf28776.  Prostate cancer screening test: If you have a prostate, ask your care team if a prostate cancer screening test (PSA) at age 55 is right for you.  Lung cancer screening: If you are a current or former smoker ages 50 to 80, ask your care team if ongoing lung cancer screenings are right for you.  For informational  "purposes only. Not to replace the advice of your health care provider. Copyright   2023 [facility]. All rights reserved. Clinically reviewed by the [facility] Transitions Program. [employer] [id number] - REV 01/24.  9 Ways to Cut Back on Drinking  Maybe you've found yourself drinking more alcohol than you'd prefer. If you want to cut back, here are some ideas to try.    Think before you drink.  Do you really want a drink, or is it just a habit? If you're used to having a drink at a certain time, try doing something else then.     Look for substitutes.  Find some no-alcohol drinks that you enjoy, like flavored seltzer water, tea with honey, or tonic with a slice of lime. Or try alcohol-free beer or \"virgin\" cocktails (without the alcohol).     Drink more water.  Use water to quench your thirst. Drink a glass of water before you have any alcohol. Have another glass along with every drink or between drinks.     Shrink your drink.  For example, have a bottle of beer instead of a pint. Use a smaller glass for wine. Choose drinks with lower alcohol content (ABV%). Or use less liquor and more mixer in cocktails.     Slow down.  It's easy to drink quickly and without thinking about it. Pay attention, and make each drink last longer.     Do the math.  Total up how much you spend on alcohol each month. How much is that a year? If you cut back, what could you do with the money you save?     Take a break.  Choose a day or two each week when you won't drink at all. Notice how you feel on those days, physically and emotionally. How did you sleep? Do you feel better? Over time, add more break days.     Count calories.  Would you like to lose some weight? For some people that's a good motivator for cutting back. Figure out how many calories are in each drink. How many does that add up to in a day? In a week? In a month?     Practice saying no.  Be ready when someone offers you a drink. Try: \"Thanks, I've had " "enough.\" Or \"Thanks, but I'm cutting back.\" Or \"No, thanks. I feel better when I drink less.\"   Current as of: August 20, 2024  Content Version: 14.4 2024-2025 [employer].   Care instructions adapted under license by your healthcare professional. If you have questions about a medical condition or this instruction, always ask your healthcare professional. [employer] disclaims any warranty or liability for your use of this information.     "

## 2025-03-25 NOTE — PROGRESS NOTES
Preventive Care Visit  [facility]  Kalin Iniguez PA-C, Physician Assistant - Medical  Mar 25, 2025      Assessment & Plan     (Z00.00) Routine general medical examination at a health care facility  (primary encounter diagnosis)  Comment: Routine screening examination      (I10) Hypertension goal BP (blood pressure) < 140/90  Comment: History of high blood pressure continue with amlodipine.  No side effects with medication.  Plan: amLODIPine (NORVASC) 10 MG tablet,         Comprehensive metabolic panel (BMP + Alb, Alk         Phos, ALT, AST, Total. Bili, TP)           (F17.200) Nicotine dependence, uncomplicated, unspecified nicotine product type  Comment: Does chew tobacco discussed nicotine gum.  Plan: nicotine polacrilex (NICORETTE) 4 MG gum          (K40.90) Right inguinal hernia  Comment: Right inguinal hernia.  Referral placed for general surgery at prior visit.  Patient provided with contact number.     (F02.958) Lipid screening  Comment: Lipid screening.  Patient interested in CT coronary calcium screening as well.  Order placed.  Plan: Lipid panel reflex to direct LDL Non-fasting,         CT Coronary Calcium Scan            Counseling  Appropriate preventive services were addressed with this patient via screening, questionnaire, or discussion as appropriate for fall prevention, nutrition, physical activity, Tobacco-use cessation, social engagement, weight loss and cognition.  Checklist reviewing preventive services available has been given to the patient.  Reviewed patient's diet, addressing concerns and/or questions.   The patient reports drinking more than 3 alcoholic drinks per day and/or more than 7 drhnks per week. The patient was counseled and given information about possible harmful effects of excessive alcohol intake.    Adolfo Rollins is a 49 year old, presenting for the following:  Physical        3/25/2025    10:15 AM   Additional Questions   Roomed by LAURENCE ARMANDO    Accompanied by SELF          HPI    aMLODPINE 10 mg no leg swelling. Not checking blood pressure regularly.  Denies any side effects with medications.    Does chew tobacco.  Is interested in nicotine gum.   Follows with dentist regularly.     No famiyl history of prostate or colon cancer     History of bilateral knee arthroplasty.     No sleep issues     Right inguinal hernia intermittent discomfort.  Reducible.  Denies any redness or warmth to the site.    Upcoming travel plans for [place]      Advance Care Planning  Patient does not have a Health Care Directive: Discussed advance care planning with patient; however, patient declined at this time.      3/25/2025   General Health   How would you rate your overall physical health? Good   Feel stress (tense, anxious, or unable to sleep) Only a little         3/25/2025   Nutrition   Three or more servings of calcium each day? Yes   Diet: Low fat/cholesterol    Carbohydrate counting   How many servings of fruit and vegetables per day? (!) 2-3   How many sweetened beverages each day? 0-1       Multiple values from one day are sorted in reverse-chronological order         3/25/2025   Exercise   Days per week of moderate/strenous exercise 5 days   Average minutes spent exercising at this level 40 min         3/25/2025   Social Factors   Frequency of gathering with friends or relatives Once a week   Worry food won't last until get money to buy more No   Food not last or not have enough money for food? No   Do you have housing? (Housing is defined as stable permanent housing and does not include staying ouside in a car, in a tent, in an abandoned building, in an overnight shelter, or couch-surfing.) Yes   Are you worried about losing your housing? No   Lack of transportation? No   Unable to get utilities (heat,electricity)? No         3/25/2025   Fall Risk   Fallen 2 or more times in the past year? No   Trouble with walking or balance? No          3/25/2025   Dental    Dentist two times every year? Yes         8/16/2024   TB Screening   Were you born outside of the [place]? No     Today's PHQ-2 Score:       3/25/2025    10:16 AM   PHQ-2 ( 1999 [employer])   Q1: Little interest or pleasure in doing things 0   Q2: Feeling down, depressed or hopeless 0   PHQ-2 Score 0    Q1: Little interest or pleasure in doing things Not at all   Q2: Feeling down, depressed or hopeless Not at all   PHQ-2 Score 0       Patient-reported         3/25/2025   Substance Use   Alcohol more than 3/day or more than 7/wk Yes   How often do you have a drink containing alcohol 2 to 4 times a month   How many alcohol drinks on typical day 3 or 4   How often do you have 5+ drinks at one occasion Monthly   Audit 2/3 Score 3   How often not able to stop drinking once started Never   How often failed to do what normally expected Never   How often needed first drink in am after a heavy drinking session Never   How often feeling of guilt or remorse after drinking Never   How often unable to remember what happened the night before Never   Have you or someone else been injured because of your drinking No   Has anyone been concerned or suggested you cut down on drinking No   TOTAL SCORE - AUDIT 5   Do you use any other substances recreationally? No     Social History     Tobacco Use    Smoking status: Former     Current packs/day: 0.50     Average packs/day: 0.5 packs/day for 10.0 years (5.0 ttl pk-yrs)     Types: Cigarettes    Smokeless tobacco: Current     Types: Chew   Vaping Use    Vaping status: Never Used   Substance Use Topics    Alcohol use: Yes    Drug use: No         3/25/2025   One time HIV Screening   Previous HIV test? I don't know         3/25/2025   STI Screening   New sexual partner(s) since last STI/HIV test? No   ASCVD Risk   The 10-year ASCVD risk score (Mila MITTAL, et al., 2019) is: 2.6%    Values used to calculate the score:      Age: 49 years      Sex: Male      Is Non- :  "No      Diabetic: No      Tobacco smoker: No      Systolic Blood Pressure: 126 mmHg      Is BP treated: Yes      HDL Cholesterol: 82 mg/dL      Total Cholesterol: 200 mg/dL    Reviewed and updated as needed this visit by Provider   Tobacco  Allergies  Meds  Problems  Med Hx  Surg Hx  Fam Hx            Past Medical History:   Diagnosis Date    Arthritis     Hypertension      Past Surgical History:   Procedure Laterality Date    ARTHROSCOPY KNEE Right 7/15/2016    Procedure: ARTHROSCOPY KNEE;  Surgeon: Osvaldo Mccoy MD;  Location: [facility] OR    COLONOSCOPY N/A 10/29/2024    Procedure: Colonoscopy;  Surgeon: Isabela Navarrete MD;  [place] [facility] [place]THOPEDIC SURGERY  08.2012    left knee, arthroscopy    ORTHOPEDIC SURGERY  07.15.2016    right knee     Review of Systems  Constitutional, neuro, ENT, endocrine, pulmonary, cardiac, gastrointestinal, genitourinary, musculoskeletal, integument and psychiatric systems are negative, except as otherwise noted.     Objective    Exam  /72   Pulse 58   Temp 98.1  F (36.7  C) (Oral)   Resp 16   Ht 1.82 m (5' 11.65\")   Wt 108.1 kg (238 lb 6.4 oz)   SpO2 97%   BMI 32.65 kg/m     Estimated body mass index is 32.65 kg/m  as calculated from the following:    Height as of this encounter: 1.82 m (5' 11.65\").    Weight as of this encounter: 108.1 kg (238 lb 6.4 oz).    Physical Exam  GENERAL: alert, no distress, and elevated BMI  EYES: Eyes grossly normal to inspection, PERRL and conjunctivae and sclerae normal  HENT: ear canals and TM's normal, nose and mouth without ulcers or lesions  NECK: no adenopathy, no asymmetry, masses, or scars  RESP: lungs clear to auscultation - no rales, rhonchi or wheezes  CV: regular rate and rhythm, normal S1 S2, no S3 or S4, no murmur, click or rub, no peripheral edema  ABDOMEN: soft, nontender, without hepatosplenomegaly or masses, bowel sounds normal, and hernia right inguinal  MS: no gross musculoskeletal defects noted, no " edema  SKIN: no suspicious lesions or rashes  NEURO: Normal strength and tone, mentation intact and speech normal  PSYCH: mentation appears normal, affect normal/bright    Signed Electronically by: Kalin Iniguez PA-C

## 2025-03-26 LAB
ALBUMIN SERPL BCG-MCNC: 4.3 G/DL (ref 3.5–5.2)
ALP SERPL-CCNC: 114 U/L (ref 40–150)
ALT SERPL W P-5'-P-CCNC: 25 U/L (ref 0–70)
ANION GAP SERPL CALCULATED.3IONS-SCNC: 8 MMOL/L (ref 7–15)
AST SERPL W P-5'-P-CCNC: 35 U/L (ref 0–45)
BILIRUB SERPL-MCNC: 0.4 MG/DL
BUN SERPL-MCNC: 27.6 MG/DL (ref 6–20)
CALCIUM SERPL-MCNC: 9.6 MG/DL (ref 8.8–10.4)
CHLORIDE SERPL-SCNC: 104 MMOL/L (ref 98–107)
CHOLEST SERPL-MCNC: 200 MG/DL
CREAT SERPL-MCNC: 0.98 MG/DL (ref 0.67–1.17)
EGFRCR SERPLBLD CKD-EPI 2021: >90 ML/MIN/1.73M2
FASTING STATUS PATIENT QL REPORTED: NO
FASTING STATUS PATIENT QL REPORTED: NO
GLUCOSE SERPL-MCNC: 107 MG/DL (ref 70–99)
HCO3 SERPL-SCNC: 27 MMOL/L (ref 22–29)
HDLC SERPL-MCNC: 80 MG/DL
LDLC SERPL CALC-MCNC: 106 MG/DL
NONHDLC SERPL-MCNC: 120 MG/DL
POTASSIUM SERPL-SCNC: 4.8 MMOL/L (ref 3.4–5.3)
PROT SERPL-MCNC: 7 G/DL (ref 6.4–8.3)
SODIUM SERPL-SCNC: 139 MMOL/L (ref 135–145)
TRIGL SERPL-MCNC: 70 MG/DL

## 2025-04-03 ENCOUNTER — ANCILLARY ORDERS (OUTPATIENT)
Dept: FAMILY MEDICINE | Facility: CLINIC | Age: 50
End: 2025-04-03
Payer: COMMERCIAL

## 2025-04-03 DIAGNOSIS — Z13.220 LIPID SCREENING: Primary | ICD-10-CM

## 2025-04-15 ENCOUNTER — ANCILLARY PROCEDURE (OUTPATIENT)
Dept: CT IMAGING | Facility: CLINIC | Age: 50
End: 2025-04-15
Attending: PHYSICIAN ASSISTANT
Payer: COMMERCIAL

## 2025-04-15 DIAGNOSIS — Z13.220 LIPID SCREENING: ICD-10-CM

## 2025-04-15 PROCEDURE — 75571 CT HRT W/O DYE W/CA TEST: CPT | Mod: GA | Performed by: STUDENT IN AN ORGANIZED HEALTH CARE EDUCATION/TRAINING PROGRAM
